# Patient Record
Sex: FEMALE | Race: WHITE | NOT HISPANIC OR LATINO | Employment: FULL TIME | URBAN - METROPOLITAN AREA
[De-identification: names, ages, dates, MRNs, and addresses within clinical notes are randomized per-mention and may not be internally consistent; named-entity substitution may affect disease eponyms.]

---

## 2018-08-03 ENCOUNTER — RECORDS - HEALTHEAST (OUTPATIENT)
Dept: LAB | Facility: CLINIC | Age: 58
End: 2018-08-03

## 2018-08-03 LAB
C REACTIVE PROTEIN LHE: 0.1 MG/DL (ref 0–0.8)
RHEUMATOID FACT SERPL-ACNC: <15 IU/ML (ref 0–30)

## 2018-08-07 LAB — CCP AB SER IA-ACNC: 0.6 U/ML

## 2018-09-02 ENCOUNTER — RECORDS - HEALTHEAST (OUTPATIENT)
Dept: LAB | Facility: CLINIC | Age: 58
End: 2018-09-02

## 2018-09-05 LAB — BACTERIA SPEC CULT: ABNORMAL

## 2020-08-14 ENCOUNTER — RECORDS - HEALTHEAST (OUTPATIENT)
Dept: LAB | Facility: CLINIC | Age: 60
End: 2020-08-14

## 2020-08-14 LAB
C REACTIVE PROTEIN LHE: 0.1 MG/DL (ref 0–0.8)
TSH SERPL DL<=0.005 MIU/L-ACNC: 1.42 UIU/ML (ref 0.3–5)

## 2020-08-17 LAB — B BURGDOR IGG+IGM SER QL: 0.09 INDEX VALUE

## 2020-08-18 LAB — ANA SER QL: 1.1 U

## 2020-11-27 ENCOUNTER — ANESTHESIA - HEALTHEAST (OUTPATIENT)
Dept: SURGERY | Facility: CLINIC | Age: 60
End: 2020-11-27

## 2020-11-27 ASSESSMENT — MIFFLIN-ST. JEOR
SCORE: 1567.44
SCORE: 1567.44

## 2020-11-28 ENCOUNTER — SURGERY - HEALTHEAST (OUTPATIENT)
Dept: SURGERY | Facility: CLINIC | Age: 60
End: 2020-11-28

## 2020-11-28 ENCOUNTER — COMMUNICATION - HEALTHEAST (OUTPATIENT)
Dept: SCHEDULING | Facility: CLINIC | Age: 60
End: 2020-11-28

## 2021-05-25 ENCOUNTER — RECORDS - HEALTHEAST (OUTPATIENT)
Dept: ADMINISTRATIVE | Facility: CLINIC | Age: 61
End: 2021-05-25

## 2021-05-28 ENCOUNTER — RECORDS - HEALTHEAST (OUTPATIENT)
Dept: ADMINISTRATIVE | Facility: CLINIC | Age: 61
End: 2021-05-28

## 2021-05-30 ENCOUNTER — HEALTH MAINTENANCE LETTER (OUTPATIENT)
Age: 61
End: 2021-05-30

## 2021-06-05 VITALS — WEIGHT: 200 LBS | HEIGHT: 70 IN | BODY MASS INDEX: 28.63 KG/M2

## 2021-06-13 NOTE — ANESTHESIA PREPROCEDURE EVALUATION
Anesthesia Evaluation      Patient summary reviewed   No history of anesthetic complications     Airway   Mallampati: II  Neck ROM: full   Pulmonary - negative ROS and normal exam    breath sounds clear to auscultation                         Cardiovascular - normal exam  Exercise tolerance: > or = 4 METS  Rhythm: regular  Rate: normal,         Neuro/Psych      Comments: Dysthymia    Endo/Other - negative ROS      GI/Hepatic/Renal    (+) GERD,        Other findings: Chronic laryngitis  Cluster HAs      Dental - normal exam                        Anesthesia Plan  Planned anesthetic: general endotracheal  D/w surgeon, he would like to avoid a block at this time  Mag  ketamine  ASA 2 - emergent   Induction: intravenous   Anesthetic plan and risks discussed with: patient  Anesthesia plan special considerations: antiemetics, dexmedetomidine  Post-op plan: routine recovery

## 2021-06-13 NOTE — ANESTHESIA CARE TRANSFER NOTE
Last vitals:   Vitals:    11/28/20 1054   BP: 149/72   Pulse: 89   Resp: 16   Temp: 36.6  C (97.9  F)   SpO2: 100%     Patient's level of consciousness is awake and drowsy  Spontaneous respirations: yes  Maintains airway independently: yes  Dentition unchanged: yes  Oropharynx: oropharynx clear of all foreign objects    QCDR Measures:  ASA# 20 - Surgical Safety Checklist: WHO surgical safety checklist completed prior to induction    PQRS# 430 - Adult PONV Prevention: 4558F - Pt received => 2 anti-emetic agents (different classes) preop & intraop  ASA# 8 - Peds PONV Prevention: NA - Not pediatric patient, not GA or 2 or more risk factors NOT present  PQRS# 424 - Jacqueline-op Temp Management: 4559F - At least one body temp DOCUMENTED => 35.5C or 95.9F within required timeframe  PQRS# 426 - PACU Transfer Protocol: - Transfer of care checklist used  ASA# 14 - Acute Post-op Pain: ASA14B - Patient did NOT experience pain >= 7 out of 10

## 2021-06-13 NOTE — ANESTHESIA POSTPROCEDURE EVALUATION
Patient: Alissa Crawley  Procedure(s):  OPEN REDUCTION INTERNAL FIXATION, FRACTURE, TIBIA, USING INTRAMEDULLARY HAYLIE (Right)  OPEN REDUCTION INTERNAL FIXATION, FRACTURE, FIBULA (Right)  Anesthesia type: general    Patient location: PACU  Last vitals:   Vitals Value Taken Time   /67 11/28/20 1130   Temp 36.3  C (97.4  F) 11/28/20 1120   Pulse 72 11/28/20 1130   Resp 16 11/28/20 1130   SpO2 97 % 11/28/20 1130     Post vital signs: stable  Level of consciousness: awake and responds to simple questions  Post-anesthesia pain: pain controlled  Post-anesthesia nausea and vomiting: no  Pulmonary: unassisted, return to baseline  Cardiovascular: stable and blood pressure at baseline  Hydration: adequate  Anesthetic events: no    QCDR Measures:  ASA# 11 - Jacqueline-op Cardiac Arrest: ASA11B - Patient did NOT experience unanticipated cardiac arrest  ASA# 12 - Jacqueline-op Mortality Rate: ASA12B - Patient did NOT die  ASA# 13 - PACU Re-Intubation Rate: ASA13B - Patient did NOT require a new airway mgmt  ASA# 10 - Composite Anes Safety: ASA10A - No serious adverse event    Additional Notes:

## 2021-06-16 PROBLEM — Z78.0 MENOPAUSE: Status: ACTIVE | Noted: 2020-11-27

## 2021-06-16 PROBLEM — S82.401G: Status: ACTIVE | Noted: 2020-11-27

## 2021-06-16 PROBLEM — S82.201S: Status: ACTIVE | Noted: 2020-11-27

## 2021-06-16 PROBLEM — S82.401S: Status: ACTIVE | Noted: 2020-11-27

## 2021-06-16 PROBLEM — S82.202P TIBIA/FIBULA FRACTURE, LEFT, CLOSED, WITH MALUNION, SUBSEQUENT ENCOUNTER: Status: ACTIVE | Noted: 2020-11-28

## 2021-06-16 PROBLEM — J37.0 CHRONIC LARYNGITIS: Status: ACTIVE | Noted: 2020-11-27

## 2021-06-16 PROBLEM — K21.9 GASTROESOPHAGEAL REFLUX DISEASE: Status: ACTIVE | Noted: 2020-11-27

## 2021-06-16 PROBLEM — N39.46 MIXED INCONTINENCE: Status: ACTIVE | Noted: 2020-11-27

## 2021-06-16 PROBLEM — S82.401A TIBIA/FIBULA FRACTURE, RIGHT, CLOSED, INITIAL ENCOUNTER: Status: ACTIVE | Noted: 2020-11-27

## 2021-06-16 PROBLEM — G44.019 EPISODIC CLUSTER HEADACHE: Status: ACTIVE | Noted: 2020-11-27

## 2021-06-16 PROBLEM — S82.201A TIBIA/FIBULA FRACTURE, SHAFT, RIGHT, CLOSED, INITIAL ENCOUNTER: Status: ACTIVE | Noted: 2020-11-27

## 2021-06-16 PROBLEM — F34.1 DYSTHYMIA: Status: ACTIVE | Noted: 2020-11-27

## 2021-06-16 PROBLEM — S82.402P TIBIA/FIBULA FRACTURE, LEFT, CLOSED, WITH MALUNION, SUBSEQUENT ENCOUNTER: Status: ACTIVE | Noted: 2020-11-28

## 2021-06-16 PROBLEM — S82.201A TIBIA/FIBULA FRACTURE, RIGHT, CLOSED, INITIAL ENCOUNTER: Status: ACTIVE | Noted: 2020-11-27

## 2021-06-16 PROBLEM — S82.401A TIBIA/FIBULA FRACTURE, SHAFT, RIGHT, CLOSED, INITIAL ENCOUNTER: Status: ACTIVE | Noted: 2020-11-27

## 2021-06-16 PROBLEM — S82.201G: Status: ACTIVE | Noted: 2020-11-27

## 2021-09-19 ENCOUNTER — HEALTH MAINTENANCE LETTER (OUTPATIENT)
Age: 61
End: 2021-09-19

## 2021-11-11 ENCOUNTER — OFFICE VISIT (OUTPATIENT)
Dept: URGENT CARE | Facility: URGENT CARE | Age: 61
End: 2021-11-11
Payer: COMMERCIAL

## 2021-11-11 ENCOUNTER — ANCILLARY PROCEDURE (OUTPATIENT)
Dept: GENERAL RADIOLOGY | Facility: CLINIC | Age: 61
End: 2021-11-11
Attending: PHYSICIAN ASSISTANT
Payer: COMMERCIAL

## 2021-11-11 VITALS
WEIGHT: 205.4 LBS | OXYGEN SATURATION: 99 % | SYSTOLIC BLOOD PRESSURE: 148 MMHG | BODY MASS INDEX: 29.47 KG/M2 | RESPIRATION RATE: 16 BRPM | TEMPERATURE: 97.3 F | HEART RATE: 79 BPM | DIASTOLIC BLOOD PRESSURE: 82 MMHG

## 2021-11-11 DIAGNOSIS — S99.911A INJURY OF RIGHT ANKLE, INITIAL ENCOUNTER: ICD-10-CM

## 2021-11-11 DIAGNOSIS — S89.91XA INJURY OF RIGHT LOWER EXTREMITY, INITIAL ENCOUNTER: ICD-10-CM

## 2021-11-11 DIAGNOSIS — S93.401A SPRAIN OF RIGHT ANKLE, UNSPECIFIED LIGAMENT, INITIAL ENCOUNTER: Primary | ICD-10-CM

## 2021-11-11 PROCEDURE — 73590 X-RAY EXAM OF LOWER LEG: CPT | Mod: RT | Performed by: RADIOLOGY

## 2021-11-11 PROCEDURE — 73610 X-RAY EXAM OF ANKLE: CPT | Mod: 26 | Performed by: RADIOLOGY

## 2021-11-11 PROCEDURE — 99203 OFFICE O/P NEW LOW 30 MIN: CPT | Performed by: PHYSICIAN ASSISTANT

## 2021-11-11 ASSESSMENT — ENCOUNTER SYMPTOMS
HEADACHES: 0
VOMITING: 0
DIZZINESS: 0
RHINORRHEA: 0
FEVER: 0
CARDIOVASCULAR NEGATIVE: 1
PALPITATIONS: 0
ENDOCRINE NEGATIVE: 1
NAUSEA: 0
NECK PAIN: 0
WEAKNESS: 0
SORE THROAT: 0
NECK STIFFNESS: 0
JOINT SWELLING: 0
RESPIRATORY NEGATIVE: 1
ARTHRALGIAS: 1
SHORTNESS OF BREATH: 0
LIGHT-HEADEDNESS: 0
MYALGIAS: 0
BACK PAIN: 0
DIARRHEA: 0
CHILLS: 0
WOUND: 0
EYES NEGATIVE: 1
COUGH: 0
ALLERGIC/IMMUNOLOGIC NEGATIVE: 1

## 2021-11-11 NOTE — PROGRESS NOTES
"Chief Complaint:    Chief Complaint   Patient presents with     Musculoskeletal Problem     11/10 stepped on wheels of suitcase and fell. Pain \"everywhere\" in right lower leg. Tenderness to touch on the outside of leg. Pt believes that there is a deformity \"bump.\"  Ambulating. Pain currently 5/10. Four breaks in right lower leg one year ago         Medical Decision Making:    Vital signs reviewed by Jose Luis Gastelum PA-C  BP (!) 148/82 (BP Location: Right arm, Patient Position: Sitting, Cuff Size: Adult Large)   Pulse 79   Temp 97.3  F (36.3  C)   Resp 16   Wt 93.2 kg (205 lb 6.4 oz)   SpO2 99%   BMI 29.47 kg/m      Differential Diagnosis:  MS Injury Pain: sprain, fracture, tendonitis, muscle strain, contusion and dislocation      ASSESSMENT:     1. Sprain of right ankle, unspecified ligament, initial encounter    2. Injury of right ankle, initial encounter    3. Injury of right lower extremity, initial encounter           PLAN:     Patient in no acute distress at time of exam.   XR of the R ankle was negative for any acute fracture.  Hardware appears stable per my read.    XR of the R tib/fib was negative for any acute fracture per my read.  RICE discussed.  Ibuprofen for pain.   Patient instructed to follow up with PCP in 1 week if symptoms are not improving.  Sooner if symptoms worsen.  Worrisome symptoms discussed with instructions to go to the ED.  Patient verbalized understanding and agreed with this plan.    Labs:     Results for orders placed or performed in visit on 11/11/21   XR Ankle Right G/E 3 Views     Status: None (Preliminary result)    Narrative    EXAM: XR ANKLE RIGHT G/E 3 VIEWS  LOCATION: [facility]  DATE/TIME: 11/11/2021 5:55 PM    INDICATION: Injury of right ankle, initial encounter.  COMPARISON: X-ray from 11/27/2020.      Impression    IMPRESSION: ORIF of distal tibial diaphyseal fracture with intramedullary thao. Side plate and screw fixation of distal fibula. " Fractures have healed. No acute fracture. Mortise and talar dome appear intact. Mild to moderate midfoot degenerative changes.   Small plantar calcaneal spur.       Current Meds:    Current Outpatient Medications:      acetaminophen (TYLENOL) 325 MG tablet, [ACETAMINOPHEN (TYLENOL) 325 MG TABLET] Take 2 tablets (650 mg total) by mouth every 4 (four) hours as needed., Disp: , Rfl: 0     calcium carbonate (OS-JAZZY) 600 mg calcium (1,500 mg) tablet, [CALCIUM CARBONATE (OS-JAZZY) 600 MG CALCIUM (1,500 MG) TABLET] Take 600 mg by mouth daily., Disp: , Rfl:      sertraline (ZOLOFT) 100 MG tablet, [SERTRALINE (ZOLOFT) 100 MG TABLET] Take 200 mg by mouth daily., Disp: , Rfl:      verapamiL (VERELAN PM) 120 MG 24 hr capsule, [VERAPAMIL (VERELAN PM) 120 MG 24 HR CAPSULE] Take 120 mg by mouth 2 (two) times a week. In the PM - on Weds and Sundays, Disp: , Rfl:      cyanocobalamin 1000 MCG tablet, [CYANOCOBALAMIN 1000 MCG TABLET] Take 1,000 mcg by mouth daily. (Patient not taking: Reported on 11/11/2021), Disp: , Rfl:      docusate sodium (COLACE) 100 MG capsule, [DOCUSATE SODIUM (COLACE) 100 MG CAPSULE] Take 1 capsule (100 mg total) by mouth 2 (two) times a day. (Patient not taking: Reported on 11/11/2021), Disp: , Rfl: 0     esomeprazole (NEXIUM) 20 MG capsule, [ESOMEPRAZOLE (NEXIUM) 20 MG CAPSULE] Take 20 mg by mouth daily before breakfast. (Patient not taking: Reported on 11/11/2021), Disp: , Rfl:      hydrOXYzine HCL (ATARAX) 25 MG tablet, [HYDROXYZINE HCL (ATARAX) 25 MG TABLET] Take 1 tablet (25 mg total) by mouth every 6 (six) hours as needed for anxiety (pain med enhancer or muscle spasm). (Patient not taking: Reported on 11/11/2021), Disp: 40 tablet, Rfl: 0     ondansetron (ZOFRAN-ODT) 4 MG disintegrating tablet, [ONDANSETRON (ZOFRAN-ODT) 4 MG DISINTEGRATING TABLET] Take 1 tablet (4 mg total) by mouth every 6 (six) hours as needed. (Patient not taking: Reported on 11/11/2021), Disp: 30 tablet, Rfl: 0     oxyCODONE  (ROXICODONE) 5 MG immediate release tablet, [OXYCODONE (ROXICODONE) 5 MG IMMEDIATE RELEASE TABLET] Take 1-2 tablets (5-10 mg total) by mouth every 4 (four) hours as needed for pain (MDD 6 tabs). (Patient not taking: Reported on 11/11/2021), Disp: 42 tablet, Rfl: 0     polyethylene glycol (MIRALAX) 17 gram packet, [POLYETHYLENE GLYCOL (MIRALAX) 17 GRAM PACKET] Take 1 packet (17 g total) by mouth daily. (Patient not taking: Reported on 11/11/2021), Disp: , Rfl: 0     rivaroxaban ANTICOAGULANT (XARELTO) 10 mg tablet, [RIVAROXABAN ANTICOAGULANT (XARELTO) 10 MG TABLET] Take 1 tablet (10 mg total) by mouth daily. (Patient not taking: Reported on 11/11/2021), Disp: 30 tablet, Rfl: 0     solifenacin (VESICARE) 10 MG tablet, [SOLIFENACIN (VESICARE) 10 MG TABLET] Take 5 mg by mouth daily. (Patient not taking: Reported on 11/11/2021), Disp: , Rfl:     Allergies:  No Known Allergies    SUBJECTIVE    HPI: Alissa Crawley is an 60 year old female who presents for evaluation and treatment of right leg pain. Patient stepped on a suitcase about 36 hours ago, causing ankle to twist. Reports an immediate shooting pain in right leg. Immediately elevated the leg. Able to ambulate and reports pain is worse when resting after ambulating. Has slight pain in right ankle and increased pain on right outer shin. Painful to the touch. About one year ago, fractured right leg and ankle and is concerned about the hardware. Denies any other concerns at this time.     Patient is new to [facility].    ROS:      Review of Systems   Constitutional: Negative for chills and fever.   HENT: Negative for congestion, ear pain, rhinorrhea and sore throat.    Eyes: Negative.    Respiratory: Negative.  Negative for cough and shortness of breath.    Cardiovascular: Negative.  Negative for chest pain and palpitations.   Gastrointestinal: Negative for diarrhea, nausea and vomiting.   Endocrine: Negative.    Genitourinary: Negative.    Musculoskeletal:  "Positive for arthralgias. Negative for back pain, joint swelling, myalgias, neck pain and neck stiffness.   Skin: Negative.  Negative for rash and wound.   Allergic/Immunologic: Negative.  Negative for immunocompromised state.   Neurological: Negative for dizziness, weakness, light-headedness and headaches.        Family History   Family History   Problem Relation Age of Onset     Heart Disease Mother      Prostate Cancer Father      Anesthesia Reaction Sister         Difficulty awakening     Clotting Disorder No family hx of        Social History  Social History     Socioeconomic History     Marital status:      Spouse name: Not on file     Number of children: Not on file     Years of education: Not on file     Highest education level: Not on file   Occupational History     Not on file   Tobacco Use     Smoking status: Never Smoker     Smokeless tobacco: Never Used   Substance and Sexual Activity     Alcohol use: Yes     Comment: Alcoholic Drinks/day: \"A drink or two on the weekend\"     Drug use: Never     Sexual activity: Not on file   Other Topics Concern     Not on file   Social History Narrative     Not on file     Social Determinants of Health     Financial Resource Strain: Not on file   Food Insecurity: Not on file   Transportation Needs: Not on file   Physical Activity: Not on file   Stress: Not on file   Social Connections: Not on file   Intimate Partner Violence: Not on file   Housing Stability: Not on file        Surgical History:  Past Surgical History:   Procedure Laterality Date     C TREAT TIBIAL SHAFT FX, INTRAMED IMPLANT Right 11/28/2020    Procedure: OPEN REDUCTION INTERNAL FIXATION, FRACTURE, TIBIA, USING INTRAMEDULLARY HAYLIE;  Surgeon: Jose Dawkins MD;  Location: [facility];  Service: Orthopedics     CHOLECYSTECTOMY       HYSTERECTOMY       OPEN REDUCTION INTERNAL FIXATION FIBULA Right 11/28/2020    Procedure: OPEN REDUCTION INTERNAL FIXATION, FRACTURE, FIBULA;  Surgeon: " Jose Dawkins MD;  Location: [facility];  Service: Orthopedics        Problem List:  Patient Active Problem List   Diagnosis     Chronic laryngitis     Dysthymia     Episodic cluster headache     Gastroesophageal reflux disease     Menopause     Mixed incontinence     Tibia/fibula fracture, shaft, right, sequela     Tibia/fibula fracture, shaft, right, closed, initial encounter     Tibia/fibula fracture, right, closed, initial encounter     Tibia/fibula fracture, right, closed, with delayed healing, subsequent encounter     Tibia/fibula fracture, left, closed, with malunion, subsequent encounter         OBJECTIVE:     Vital signs noted and reviewed by Jose Luis Gastelum PA-C  BP (!) 148/82 (BP Location: Right arm, Patient Position: Sitting, Cuff Size: Adult Large)   Pulse 79   Temp 97.3  F (36.3  C)   Resp 16   Wt 93.2 kg (205 lb 6.4 oz)   SpO2 99%   BMI 29.47 kg/m       PEFR:    Physical Exam  Vitals and nursing note reviewed.   Constitutional:       General: She is not in acute distress.     Appearance: She is well-developed. She is not ill-appearing, toxic-appearing or diaphoretic.   Cardiovascular:      Rate and Rhythm: Normal rate and regular rhythm.      Heart sounds: Normal heart sounds, S1 normal and S2 normal. No murmur heard.  No friction rub. No gallop.    Pulmonary:      Effort: Pulmonary effort is normal. No respiratory distress.      Breath sounds: Normal breath sounds. No decreased breath sounds, wheezing, rhonchi or rales.   Abdominal:      Palpations: Abdomen is not rigid.   Musculoskeletal:      Right knee: Normal range of motion.      Right ankle: Normal range of motion.        Legs:    Skin:     General: Skin is warm and dry.   Neurological:      Mental Status: She is alert and oriented to person, place, and time.      Deep Tendon Reflexes: Reflexes are normal and symmetric.   Psychiatric:         Behavior: Behavior normal. Behavior is cooperative.         Thought Content: Thought  content normal.         Judgment: Judgment normal.             Jose Luis Gastelum PA-C  11/11/2021, 5:38 PM

## 2021-11-12 NOTE — PATIENT INSTRUCTIONS
Patient Education     Treating Ankle Sprains  Treatment will depend on how bad your sprain is. For a severe sprain, healing may take 3 months or more.  Right after your injury: Use R.I.C.E.    BIG: Rest: At first, keep weight off the ankle as much as you can. You may be given crutches to help you walk without putting weight on the ankle.    BIG: Ice: Put an ice pack on the ankle for 20 minutes. Remove the pack and wait at least 30 minutes. Repeat for up to 3 days. This helps reduce swelling.    BIG: Compression: To reduce swelling and keep the joint stable, you may need to wrap the ankle with an elastic bandage. For more severe sprains, you may need an ankle brace, a boot, or a cast.    BIG: Elevation: To reduce swelling, keep your ankle raised above your heart when you sit or lie down.  Medicine  Your healthcare provider may suggest oral nonsteroidal anti-inflammatory medicine (NSAIDs), such as ibuprofen. This relieves the pain and helps reduce swelling. Be sure to take your medicine as directed.  Exercises    After about 2 to 3 weeks, you may be given exercises to strengthen the ligaments and muscles in the ankle. Doing these exercises will help prevent another ankle sprain. Exercises may include standing on your toes and then on your heels and doing ankle curls.    Sit on the edge of a sturdy table or lie on your back.    Pull your toes toward you. Then point them away from you. Repeat for 2 to 3 minutes.  [employer] last reviewed this educational content on 1/1/2018 2000-2021 [employer]. All rights reserved. This information is not intended as a substitute for professional medical care. Always follow your healthcare professional's instructions.

## 2022-02-08 ENCOUNTER — LAB REQUISITION (OUTPATIENT)
Dept: LAB | Facility: CLINIC | Age: 62
End: 2022-02-08
Payer: COMMERCIAL

## 2022-02-08 DIAGNOSIS — Z01.818 ENCOUNTER FOR OTHER PREPROCEDURAL EXAMINATION: ICD-10-CM

## 2022-02-08 LAB — SARS-COV-2 RNA RESP QL NAA+PROBE: NEGATIVE

## 2022-02-08 PROCEDURE — U0005 INFEC AGEN DETEC AMPLI PROBE: HCPCS | Mod: ORL | Performed by: PHYSICIAN ASSISTANT

## 2022-02-10 ENCOUNTER — HOSPITAL ENCOUNTER (EMERGENCY)
Facility: CLINIC | Age: 62
Discharge: HOME | End: 2022-02-10
Attending: FAMILY MEDICINE | Admitting: FAMILY MEDICINE
Payer: COMMERCIAL

## 2022-02-10 VITALS
BODY MASS INDEX: 30.06 KG/M2 | OXYGEN SATURATION: 99 % | WEIGHT: 210 LBS | SYSTOLIC BLOOD PRESSURE: 143 MMHG | HEIGHT: 70 IN | DIASTOLIC BLOOD PRESSURE: 96 MMHG | RESPIRATION RATE: 16 BRPM | TEMPERATURE: 97.6 F | HEART RATE: 70 BPM

## 2022-02-10 DIAGNOSIS — T81.31XA POSTOPERATIVE WOUND DEHISCENCE, INITIAL ENCOUNTER: ICD-10-CM

## 2022-02-10 PROCEDURE — 12002 RPR S/N/AX/GEN/TRNK2.6-7.5CM: CPT | Performed by: FAMILY MEDICINE

## 2022-02-10 PROCEDURE — 99282 EMERGENCY DEPT VISIT SF MDM: CPT | Mod: 25 | Performed by: FAMILY MEDICINE

## 2022-02-10 PROCEDURE — 99283 EMERGENCY DEPT VISIT LOW MDM: CPT | Performed by: FAMILY MEDICINE

## 2022-02-10 ASSESSMENT — MIFFLIN-ST. JEOR: SCORE: 1607.8

## 2022-02-10 NOTE — ED TRIAGE NOTES
Pt had plate and screws taken out of her right foot today at [facility] orthopedics. Now bleeding

## 2022-02-10 NOTE — ED PROVIDER NOTES
History     Chief Complaint   Patient presents with     Post-op Problem     HPI  Alissa Crawley is a 61 year old female who presents with history of cluster headaches, post menopause, to be a fibular fracture.  She had a revision of an ORIF today when hardware was removed by [facility] in [place].  The procedure was without complication was done about 1400 hrs.  She arrived home and barely moved her foot when she noted there was active bleeding from the wound area and she noted likely dehiscence of the wound approximately half centimeter.  No other injuries.  No significant pain.  Local anesthetic is still active.        Allergies:  No Known Allergies    Problem List:    Patient Active Problem List    Diagnosis Date Noted     Tibia/fibula fracture, left, closed, with malunion, subsequent encounter 11/28/2020     Priority: Medium     Chronic laryngitis 11/27/2020     Priority: Medium     Dysthymia 11/27/2020     Priority: Medium     Episodic cluster headache 11/27/2020     Priority: Medium     Gastroesophageal reflux disease 11/27/2020     Priority: Medium     Menopause 11/27/2020     Priority: Medium     Mixed incontinence 11/27/2020     Priority: Medium     Tibia/fibula fracture, shaft, right, sequela 11/27/2020     Priority: Medium     Tibia/fibula fracture, shaft, right, closed, initial encounter 11/27/2020     Priority: Medium     Tibia/fibula fracture, right, closed, initial encounter 11/27/2020     Priority: Medium     Added automatically from request for surgery [id number]         Tibia/fibula fracture, right, closed, with delayed healing, subsequent encounter 11/27/2020     Priority: Medium        Past Medical History:    Past Medical History:   Diagnosis Date     Chronic laryngitis 11/27/2020     Dysthymia 11/27/2020     Episodic cluster headache 11/27/2020     Gastroesophageal reflux disease 11/27/2020       Past Surgical History:    Past Surgical History:   Procedure Laterality Date      "CHOLECYSTECTOMY       HYSTERECTOMY       OPEN REDUCTION INTERNAL FIXATION FIBULA Right 11/28/2020    Procedure: OPEN REDUCTION INTERNAL FIXATION, FRACTURE, FIBULA;  Surgeon: Jose Dawkins MD;  Location: [facility];  Service: Orthopedics     [facility] TREAT TIBIAL SHAFT FX, INTRAMED IMPLANT Right 11/28/2020    Procedure: OPEN REDUCTION INTERNAL FIXATION, FRACTURE, TIBIA, USING INTRAMEDULLARY HAYLIE;  Surgeon: Jose Dawkins MD;  Location: [facility];  Service: Orthopedics       Family History:    Family History   Problem Relation Age of Onset     Heart Disease Mother      Prostate Cancer Father      Anesthesia Reaction Sister         Difficulty awakening     Clotting Disorder No family hx of        Social History:  Marital Status:   [2]  Social History     Tobacco Use     Smoking status: Never Smoker     Smokeless tobacco: Never Used   Substance Use Topics     Alcohol use: Yes     Comment: Alcoholic Drinks/day: \"A drink or two on the weekend\"     Drug use: Never        Medications:    acetaminophen (TYLENOL) 325 MG tablet  calcium carbonate (OS-JAZZY) 600 mg calcium (1,500 mg) tablet  cyanocobalamin 1000 MCG tablet  docusate sodium (COLACE) 100 MG capsule  esomeprazole (NEXIUM) 20 MG capsule  hydrOXYzine HCL (ATARAX) 25 MG tablet  ondansetron (ZOFRAN-ODT) 4 MG disintegrating tablet  oxyCODONE (ROXICODONE) 5 MG immediate release tablet  polyethylene glycol (MIRALAX) 17 gram packet  rivaroxaban ANTICOAGULANT (XARELTO) 10 mg tablet  sertraline (ZOLOFT) 100 MG tablet  solifenacin (VESICARE) 10 MG tablet  verapamiL (VERELAN PM) 120 MG 24 hr capsule          Review of Systems  ROS:  5 point ROS negative except as noted above in HPI, including Gen., Resp., CV, GI &  system review.    Physical Exam   BP: (!) 143/96  Pulse: 70  Temp: 97.6  F (36.4  C)  Resp: 16  Height: 177.8 cm (5' 10\")  Weight: 95.3 kg (210 lb)  SpO2: 99 %      Physical Exam     Distal pulses are intact.  There is no active bleeding from " the site.  Findings consistent with dehiscence of wound.  Distal motor function and sensation intact.              ED Course                 [facility]    -Laceration Repair    Date/Time: 2/10/2022 7:21 PM  Performed by: Mehul Noble MD  Authorized by: Mehul Noble MD     Risks, benefits and alternatives discussed.    LACERATION DETAILS     Location:  Leg    Leg location:  R lower leg    Length (cm):  3    Depth (mm):  5    REPAIR TYPE:     Repair type:  Simple      EXPLORATION:     Wound exploration: wound explored through full range of motion and entire depth of wound probed and visualized      Contaminated: no      TREATMENT:     Area cleansed with:  Saline    Amount of cleaning:  Extensive    Irrigation solution:  Sterile saline    Irrigation volume:  1000    SKIN REPAIR     Repair method:  Sutures    Suture size:  3-0    Suture material:  Nylon    Number of sutures:  6    APPROXIMATION     Approximation:  Close    POST-PROCEDURE DETAILS     Dressing:  Sterile dressing        PROCEDURE    Patient Tolerance:  Patient tolerated the procedure well with no immediate complications                Critical Care time:  none               No results found for this or any previous visit (from the past 24 hour(s)).    Medications - No data to display    Assessments & Plan (with Medical Decision Making)     MDM: Alissa Crawley is a 61 year old female who presents with a wound that has dehisced immediately after surgery today on the medial aspect of the ankle.  No significant injury to the site.  She barely moved her ankle and found bleeding.  There is no active bleeding at this point.  I have paged [facility] to ask their permission to repair the wound and resuture.   I spoke with Dr. Daniel at [facility] who approved my placing repeat sutures.  She recommends simple interrupted 3 all.  Patient had a nerve block and no additional anesthesia was needed.  She was  extensively irrigated with 1000 cc and this was resutured without complication.  There was some venous bleeding present.  The wound was then covered with Adaptic, gauze and Ace.  Plan for sutures out 10 to 14 days.  I asked her to discuss further with her surgeon as to the timing.  She does have an appointment with them in 16 days and she will talk to them whether the sutures should come out than earlier.        I have reviewed the nursing notes.    I have reviewed the findings, diagnosis, plan and need for follow up with the patient.       New Prescriptions    No medications on file       Final diagnoses:   Postoperative wound dehiscence, initial encounter - after consulting with [facility], I reclosed  the wound.  this  will ooze some  blood tonight. keep bandaged.  return immed for signs infection. sutures out in 10-14 days - wouldlean towards 14.  discuss  further with ortho       2/10/2022   [facility] EMERGENCY DEPT     Mehul Noble MD  02/11/22 0225

## 2022-02-11 NOTE — DISCHARGE INSTRUCTIONS
ICD-10-CM    1. Postoperative wound dehiscence, initial encounter  T81.31XA     after consulting with Eufemia, I reclosed  the wound.  this  will ooze some  blood tonight. keep bandaged.  return immed for signs infection. sutures out in 10-14 days - wouldlean towards 14.  discuss  further with ortho

## 2022-06-26 ENCOUNTER — HEALTH MAINTENANCE LETTER (OUTPATIENT)
Age: 62
End: 2022-06-26

## 2022-07-17 ENCOUNTER — APPOINTMENT (OUTPATIENT)
Dept: GENERAL RADIOLOGY | Facility: CLINIC | Age: 62
End: 2022-07-17
Attending: EMERGENCY MEDICINE
Payer: COMMERCIAL

## 2022-07-17 ENCOUNTER — HOSPITAL ENCOUNTER (EMERGENCY)
Facility: CLINIC | Age: 62
Discharge: HOME | End: 2022-07-17
Attending: EMERGENCY MEDICINE | Admitting: EMERGENCY MEDICINE
Payer: COMMERCIAL

## 2022-07-17 VITALS
DIASTOLIC BLOOD PRESSURE: 77 MMHG | OXYGEN SATURATION: 97 % | SYSTOLIC BLOOD PRESSURE: 140 MMHG | TEMPERATURE: 98.4 F | HEART RATE: 57 BPM | WEIGHT: 215 LBS | BODY MASS INDEX: 30.78 KG/M2 | HEIGHT: 70 IN | RESPIRATION RATE: 16 BRPM

## 2022-07-17 DIAGNOSIS — M54.50 LUMBAR BACK PAIN: ICD-10-CM

## 2022-07-17 PROCEDURE — 72100 X-RAY EXAM L-S SPINE 2/3 VWS: CPT

## 2022-07-17 PROCEDURE — 96375 TX/PRO/DX INJ NEW DRUG ADDON: CPT | Performed by: EMERGENCY MEDICINE

## 2022-07-17 PROCEDURE — 99284 EMERGENCY DEPT VISIT MOD MDM: CPT | Performed by: EMERGENCY MEDICINE

## 2022-07-17 PROCEDURE — 250N000011 HC RX IP 250 OP 636: Performed by: EMERGENCY MEDICINE

## 2022-07-17 PROCEDURE — 96374 THER/PROPH/DIAG INJ IV PUSH: CPT | Performed by: EMERGENCY MEDICINE

## 2022-07-17 PROCEDURE — 250N000013 HC RX MED GY IP 250 OP 250 PS 637: Performed by: EMERGENCY MEDICINE

## 2022-07-17 PROCEDURE — 99284 EMERGENCY DEPT VISIT MOD MDM: CPT | Mod: 25 | Performed by: EMERGENCY MEDICINE

## 2022-07-17 RX ORDER — ONDANSETRON 2 MG/ML
4 INJECTION INTRAMUSCULAR; INTRAVENOUS ONCE
Status: COMPLETED | OUTPATIENT
Start: 2022-07-17 | End: 2022-07-17

## 2022-07-17 RX ORDER — PREDNISONE 10 MG/1
TABLET ORAL
Qty: 7 TABLET | Refills: 0 | Status: SHIPPED | OUTPATIENT
Start: 2022-07-19 | End: 2022-07-23

## 2022-07-17 RX ORDER — DEXAMETHASONE SODIUM PHOSPHATE 10 MG/ML
10 INJECTION, SOLUTION INTRAMUSCULAR; INTRAVENOUS ONCE
Status: COMPLETED | OUTPATIENT
Start: 2022-07-17 | End: 2022-07-17

## 2022-07-17 RX ORDER — CYCLOBENZAPRINE HCL 5 MG
5 TABLET ORAL ONCE
Status: COMPLETED | OUTPATIENT
Start: 2022-07-17 | End: 2022-07-17

## 2022-07-17 RX ORDER — KETOROLAC TROMETHAMINE 15 MG/ML
10 INJECTION, SOLUTION INTRAMUSCULAR; INTRAVENOUS
Status: COMPLETED | OUTPATIENT
Start: 2022-07-17 | End: 2022-07-17

## 2022-07-17 RX ORDER — CYCLOBENZAPRINE HCL 5 MG
5 TABLET ORAL 3 TIMES DAILY PRN
Qty: 10 TABLET | Refills: 0 | Status: SHIPPED | OUTPATIENT
Start: 2022-07-17 | End: 2022-08-18

## 2022-07-17 RX ADMIN — ONDANSETRON 4 MG: 2 INJECTION INTRAMUSCULAR; INTRAVENOUS at 10:33

## 2022-07-17 RX ADMIN — DEXAMETHASONE SODIUM PHOSPHATE 10 MG: 10 INJECTION, SOLUTION INTRAMUSCULAR; INTRAVENOUS at 11:52

## 2022-07-17 RX ADMIN — CYCLOBENZAPRINE HYDROCHLORIDE 5 MG: 5 TABLET, FILM COATED ORAL at 12:08

## 2022-07-17 RX ADMIN — KETOROLAC TROMETHAMINE 10 MG: 15 INJECTION, SOLUTION INTRAMUSCULAR; INTRAVENOUS at 11:53

## 2022-07-17 ASSESSMENT — ENCOUNTER SYMPTOMS
PSYCHIATRIC NEGATIVE: 1
NEUROLOGICAL NEGATIVE: 1
GASTROINTESTINAL NEGATIVE: 1
EYES NEGATIVE: 1
ENDOCRINE NEGATIVE: 1
RESPIRATORY NEGATIVE: 1
BACK PAIN: 1
ALLERGIC/IMMUNOLOGIC NEGATIVE: 1
CONSTITUTIONAL NEGATIVE: 1
HEMATOLOGIC/LYMPHATIC NEGATIVE: 1
CARDIOVASCULAR NEGATIVE: 1

## 2022-07-17 NOTE — ED NOTES
Ice pack given to pt. Meds given, see MAR. Pt states that her son is on his way to sit with her and be her ride home if needed.   Pt resting in position of comfort. Call light in reach.

## 2022-07-17 NOTE — ED TRIAGE NOTES
"Pt was bent over putting things in the refrigerator today and \"couldn't straiten out after that\".   Pt was given 50mcg Fentanyl by EMS. 20g IV R hand.   Pt denies any bowel/bladder concerns. No numbness/tinlging. Increased pain with movement.     Triage Assessment     Row Name 07/17/22 1027       Triage Assessment (Adult)    Airway WDL WDL       Respiratory WDL    Respiratory WDL WDL       Skin Circulation/Temperature WDL    Skin Circulation/Temperature WDL WDL       Cardiac WDL    Cardiac WDL WDL       Peripheral/Neurovascular WDL    Peripheral Neurovascular WDL WDL       Cognitive/Neuro/Behavioral WDL    Cognitive/Neuro/Behavioral WDL WDL              "

## 2022-07-17 NOTE — ED PROVIDER NOTES
History     Chief Complaint   Patient presents with     Back Pain     Pt was lifting items and felt a pop     HPI  Alissa Crawley is a 61 year old female arrived by EMS with sudden onset of back pain after reporting that she had placed items above her refrigerator earlier in the day prior to ED arrival.  Felt a sudden pop with intense pain.  EMS providers administered IV fentanyl with some relief but continued ability to straighten her back by report.    Medical records show a history of chronic laryngitis, dysthymia and episodic cluster headaches and GERD.  Patient tells me she is only on verapamil and sertraline.  She is home alone in [place] right now as her  is fishing in [place].  She was placing pop cans into the refrigerator bent over when she got up and suddenly felt a popping sensation and developed acute pain about the lower lumbar spine.  She has no paresthesias or anesthesia no leg weakness.  The pain does not radiate and is localized around the lumbosacral region.  Patient reports she did not suffer a fall.  She does not know if she has a history of back problems and has not had any back surgery. Patient reports that she suffered a right lower leg injury about a year ago while on her deck with her dog and had surgical repair.  She has favored her right leg since and may have some asymmetry with her pelvis and hip.    Allergies:  No Known Allergies    Problem List:    Patient Active Problem List    Diagnosis Date Noted     Tibia/fibula fracture, left, closed, with malunion, subsequent encounter 11/28/2020     Priority: Medium     Chronic laryngitis 11/27/2020     Priority: Medium     Dysthymia 11/27/2020     Priority: Medium     Episodic cluster headache 11/27/2020     Priority: Medium     Gastroesophageal reflux disease 11/27/2020     Priority: Medium     Menopause 11/27/2020     Priority: Medium     Mixed incontinence 11/27/2020     Priority: Medium     Tibia/fibula fracture, shaft,  "right, sequela 11/27/2020     Priority: Medium     Tibia/fibula fracture, shaft, right, closed, initial encounter 11/27/2020     Priority: Medium     Tibia/fibula fracture, right, closed, initial encounter 11/27/2020     Priority: Medium     Added automatically from request for surgery [id number]         Tibia/fibula fracture, right, closed, with delayed healing, subsequent encounter 11/27/2020     Priority: Medium        Past Medical History:    Past Medical History:   Diagnosis Date     Chronic laryngitis 11/27/2020     Dysthymia 11/27/2020     Episodic cluster headache 11/27/2020     Gastroesophageal reflux disease 11/27/2020       Past Surgical History:    Past Surgical History:   Procedure Laterality Date     CHOLECYSTECTOMY       HYSTERECTOMY       OPEN REDUCTION INTERNAL FIXATION FIBULA Right 11/28/2020    Procedure: OPEN REDUCTION INTERNAL FIXATION, FRACTURE, FIBULA;  Surgeon: Jose Dawkins MD;  Location: [facility];  Service: Orthopedics     [facility] TREAT TIBIAL SHAFT FX, INTRAMED IMPLANT Right 11/28/2020    Procedure: OPEN REDUCTION INTERNAL FIXATION, FRACTURE, TIBIA, USING INTRAMEDULLARY HAYLIE;  Surgeon: Jose Dawkins MD;  Location: [facility];  Service: Orthopedics       Family History:    Family History   Problem Relation Age of Onset     Heart Disease Mother      Prostate Cancer Father      Anesthesia Reaction Sister         Difficulty awakening     Clotting Disorder No family hx of        Social History:  Marital Status:   [2]  Social History     Tobacco Use     Smoking status: Never Smoker     Smokeless tobacco: Never Used   Substance Use Topics     Alcohol use: Yes     Comment: Alcoholic Drinks/day: \"A drink or two on the weekend\"     Drug use: Never        Medications:    cyclobenzaprine (FLEXERIL) 5 MG tablet  [START ON 7/19/2022] predniSONE (DELTASONE) 10 MG tablet  acetaminophen (TYLENOL) 325 MG tablet  calcium carbonate (OS-JAZZY) 600 mg calcium (1,500 mg) " "tablet  cyanocobalamin 1000 MCG tablet  docusate sodium (COLACE) 100 MG capsule  esomeprazole (NEXIUM) 20 MG capsule  hydrOXYzine HCL (ATARAX) 25 MG tablet  ondansetron (ZOFRAN-ODT) 4 MG disintegrating tablet  oxyCODONE (ROXICODONE) 5 MG immediate release tablet  polyethylene glycol (MIRALAX) 17 gram packet  rivaroxaban ANTICOAGULANT (XARELTO) 10 mg tablet  sertraline (ZOLOFT) 100 MG tablet  solifenacin (VESICARE) 10 MG tablet  verapamiL (VERELAN PM) 120 MG 24 hr capsule          Review of Systems   Constitutional: Negative.    HENT: Negative.    Eyes: Negative.    Respiratory: Negative.    Cardiovascular: Negative.    Gastrointestinal: Negative.    Endocrine: Negative.    Genitourinary: Negative.    Musculoskeletal: Positive for back pain.   Skin: Negative.    Allergic/Immunologic: Negative.    Neurological: Negative.    Hematological: Negative.    Psychiatric/Behavioral: Negative.    All other systems reviewed and are negative.      Physical Exam   BP: (!) 145/93  Pulse: 69  Temp: 98.4  F (36.9  C)  Resp: 16  Height: 177.8 cm (5' 10\")  Weight: 97.5 kg (215 lb)  SpO2: 98 %      Physical Exam  Constitutional:       General: She is not in acute distress.     Appearance: Normal appearance. She is not ill-appearing, toxic-appearing or diaphoretic.   HENT:      Head: Normocephalic and atraumatic.      Nose: Nose normal.   Eyes:      Extraocular Movements: Extraocular movements intact.      Pupils: Pupils are equal, round, and reactive to light.   Cardiovascular:      Rate and Rhythm: Normal rate.      Pulses: Normal pulses.   Pulmonary:      Effort: Pulmonary effort is normal. No respiratory distress.      Breath sounds: Normal breath sounds. No stridor. No wheezing or rhonchi.   Chest:      Chest wall: No tenderness.   Musculoskeletal:         General: Tenderness present. No swelling, deformity or signs of injury.        Arms:       Cervical back: Normal range of motion and neck supple.        Back:    Skin:     " Capillary Refill: Capillary refill takes less than 2 seconds.      Coloration: Skin is not jaundiced or pale.      Findings: No erythema, lesion or rash.   Neurological:      Mental Status: She is alert and oriented to person, place, and time.      Cranial Nerves: No cranial nerve deficit.      Sensory: No sensory deficit.      Motor: No weakness.      Coordination: Coordination normal.      Gait: Gait normal.      Deep Tendon Reflexes: Reflexes normal.   Psychiatric:         Mood and Affect: Mood normal.         Behavior: Behavior normal.         Thought Content: Thought content normal.         Judgment: Judgment normal.         ED Course                 Procedures              Critical Care time:  none               ED medications:  Medications   ondansetron (ZOFRAN) injection 4 mg (4 mg Intravenous Given 7/17/22 1033)   cyclobenzaprine (FLEXERIL) tablet 5 mg (5 mg Oral Given 7/17/22 1208)   dexamethasone PF (DECADRON) injection 10 mg (10 mg Intravenous Given 7/17/22 1152)   ketorolac (TORADOL) injection 10 mg (10 mg Intravenous Given 7/17/22 1153)     ED vitals:  Vitals:    07/17/22 1245 07/17/22 1300 07/17/22 1315 07/17/22 1330   BP: 126/83 124/76 122/74 139/78   Pulse: 59 56 58 57   Resp:    16   Temp:       TempSrc:       SpO2: 99% 98% 99% 100%   Weight:       Height:         ED labs and imaging:  Results for orders placed or performed during the hospital encounter of 07/17/22   Lumbar spine XR, 2-3 views     Status: None    Narrative    EXAM: XR LUMBAR SPINE 2-3 VIEWS  LOCATION: [facility]  DATE/TIME: 07/17/2022, 11:56 AM    INDICATION: Low back pain.  Patient was placing soda cans in her refrigerator when she felt a pop.  Evaluate for acute bony process.  COMPARISON: None.  TECHNIQUE: CR Lumbar Spine.      Impression    IMPRESSION: Five lumbar-type vertebrae. Normal alignment. Vertebral body heights normal. No fractures. Loss of disc height and degenerative endplate spurring at  L1-L2. No other significant degenerative change.         Assessments & Plan (with Medical Decision Making)   Assessment Summary and clinical Impression: 61-year-old female presented with back pain that began while placing pop cans into her refrigerator just prior to arrival when she repositioned and felt a pop and developed intense pain. She was given IV fentanyl by EMS providers because she could not straighten her back out.  She has a history of chronic laryngitis, dysthymia GERD.  She arrived afebrile intake blood pressure was 150/92. On examination patient reports  shedid not suffer a fall.  No known history of back surgery or injury but may have some arthritis.  On exam she did not have any leg weakness or numbness.  There is reproducible pain in the lumbosacral area there is no soft tissue swelling and no flank pain or tendernes.  She reported some mild lower abdominal cramping. XR imaging obtained given age report of hearing a pop persistent pain despite therapies offered by EMS providers.Imaging revealed no acute compression fracture or significant degenerative changes . Patient reported no red flags specifically no fever or chills. she was offered additional therapies to manage lumbar back pain.  Pain was moderately controlled and patient expressed comfort with ongoing care as an outpatient.    ED course and plan:  Reviewed the medical record.  ED visit on 2/10/2022.  She was offered therapies to manage her pain.  X-ray imaging of the lumbar spine was obtained with report of pop exquisite pain inability to sit up straight to evaluate for compression fracture versus other acute process.   X-ray imaging of the lumbar spine was reviewed independently and the radiologist report was also reviewed.  Patient  was reevaluated after therapies given during her ED course and she ambulated without difficulty.  She reported  having  relief in her discomfort and expressed comfort going home with ongoing care as an  outpatient.  I suspect lumbar strain based on mechanism reported with reassuring x-ray showing no compression fracture or significant degenerative changes.  She was offered supportive care measures including heat or ice whatever feels good.  We also discussed Flexeril and she was given a prednisone taper.  We discussed that although her initial evaluation today did not reveal an emergency diagnosis if she has persistent pain or develop new symptoms of concern including but not limited to leg weakness or numbness, difficulty urinating or defecating, persistent back pain or abdominal pain she should return to be reevaluated.  Further imaging may be required and can be coordinated with her clinic provider as needed,      Disclaimer: This note consists of symbols derived from keyboarding, dictation and/or voice recognition software. As a result, there may be errors in the script that have gone undetected. Please consider this when interpreting information found in this chart.  I have reviewed the nursing notes.    I have reviewed the findings, diagnosis, plan and need for follow up with the patient.       New Prescriptions    CYCLOBENZAPRINE (FLEXERIL) 5 MG TABLET    Take 1 tablet (5 mg) by mouth 3 times daily as needed for muscle spasms    PREDNISONE (DELTASONE) 10 MG TABLET    Two tablets daily for 2 days, then one tab for two days, the 1/2 tab daily for 2 days       Final diagnoses:   Lumbar back pain - acute onset prior to ED arrival after bending over placing soda pop in the refrigerator       7/17/2022   [facility] EMERGENCY DEPT     Waqas Jacobs MD  07/17/22 5195

## 2022-07-17 NOTE — DISCHARGE INSTRUCTIONS
1) Your evaluation today did not reveal an emergency diagnosis and your symptoms are suspicious for lumbar strain.  X imaging show any compression fracture.  You reported some improvement during your visit with therapies given to manage her symptoms.  He appears stable for discharge to home.    2) Suggest using prescriptions provided Flexeril for muscle spasms and pain as needed 3 times a day over the next 1 week, Steroids (prednisone) taper to begin in 48 hours if you have persistent pain. You received a dose during your visit.    3) Although you appear stable for discharge to home if you develop new symptoms of concern including but not limited to leg weakness or numbness, difficulty defecating or urinating you should return to the department to be reevaluated.  Follow-up imaging if you have persistent pain in the next 1 to 4 weeks may be beneficial and this can be coordinated with your clinic provider

## 2022-07-17 NOTE — ED NOTES
"Pt back from xray, used bedside commode and then back to bed. Pt states \"at least I can move a little bit now\".   Pt back on monitors, call light in reach. Awaiting results and disposition.   "

## 2022-08-18 ENCOUNTER — OFFICE VISIT (OUTPATIENT)
Dept: FAMILY MEDICINE | Facility: CLINIC | Age: 62
End: 2022-08-18
Payer: COMMERCIAL

## 2022-08-18 VITALS
HEIGHT: 70 IN | HEART RATE: 95 BPM | TEMPERATURE: 97.9 F | OXYGEN SATURATION: 98 % | DIASTOLIC BLOOD PRESSURE: 86 MMHG | WEIGHT: 211 LBS | SYSTOLIC BLOOD PRESSURE: 112 MMHG | RESPIRATION RATE: 16 BRPM | BODY MASS INDEX: 30.21 KG/M2

## 2022-08-18 DIAGNOSIS — E55.9 VITAMIN D DEFICIENCY: ICD-10-CM

## 2022-08-18 DIAGNOSIS — Z86.59 HISTORY OF CLAUSTROPHOBIA: ICD-10-CM

## 2022-08-18 DIAGNOSIS — M54.16 LUMBAR RADICULOPATHY: Primary | ICD-10-CM

## 2022-08-18 DIAGNOSIS — R20.2 PARESTHESIA: ICD-10-CM

## 2022-08-18 LAB
ANION GAP SERPL CALCULATED.3IONS-SCNC: 7 MMOL/L (ref 3–14)
BUN SERPL-MCNC: 20 MG/DL (ref 7–30)
CALCIUM SERPL-MCNC: 9 MG/DL (ref 8.5–10.1)
CHLORIDE BLD-SCNC: 105 MMOL/L (ref 94–109)
CO2 SERPL-SCNC: 26 MMOL/L (ref 20–32)
CREAT SERPL-MCNC: 0.78 MG/DL (ref 0.52–1.04)
CRP SERPL-MCNC: <2.9 MG/L (ref 0–8)
DEPRECATED CALCIDIOL+CALCIFEROL SERPL-MC: 30 UG/L (ref 20–75)
ERYTHROCYTE [DISTWIDTH] IN BLOOD BY AUTOMATED COUNT: 13.6 % (ref 10–15)
ERYTHROCYTE [SEDIMENTATION RATE] IN BLOOD BY WESTERGREN METHOD: 29 MM/HR (ref 0–30)
FOLATE SERPL-MCNC: 17.7 NG/ML (ref 4.6–34.8)
GFR SERPL CREATININE-BSD FRML MDRD: 87 ML/MIN/1.73M2
GLUCOSE BLD-MCNC: 107 MG/DL (ref 70–99)
HCT VFR BLD AUTO: 35.5 % (ref 35–47)
HGB BLD-MCNC: 11.7 G/DL (ref 11.7–15.7)
MCH RBC QN AUTO: 32.5 PG (ref 26.5–33)
MCHC RBC AUTO-ENTMCNC: 33 G/DL (ref 31.5–36.5)
MCV RBC AUTO: 99 FL (ref 78–100)
PLATELET # BLD AUTO: 258 10E3/UL (ref 150–450)
POTASSIUM BLD-SCNC: 4.2 MMOL/L (ref 3.4–5.3)
RBC # BLD AUTO: 3.6 10E6/UL (ref 3.8–5.2)
SODIUM SERPL-SCNC: 138 MMOL/L (ref 133–144)
TSH SERPL DL<=0.005 MIU/L-ACNC: 1.76 MU/L (ref 0.4–4)
VIT B12 SERPL-MCNC: 431 PG/ML (ref 232–1245)
WBC # BLD AUTO: 4.6 10E3/UL (ref 4–11)

## 2022-08-18 PROCEDURE — 36415 COLL VENOUS BLD VENIPUNCTURE: CPT | Performed by: NURSE PRACTITIONER

## 2022-08-18 PROCEDURE — 80048 BASIC METABOLIC PNL TOTAL CA: CPT | Performed by: NURSE PRACTITIONER

## 2022-08-18 PROCEDURE — 82607 VITAMIN B-12: CPT | Performed by: NURSE PRACTITIONER

## 2022-08-18 PROCEDURE — 99204 OFFICE O/P NEW MOD 45 MIN: CPT | Performed by: NURSE PRACTITIONER

## 2022-08-18 PROCEDURE — 85027 COMPLETE CBC AUTOMATED: CPT | Performed by: NURSE PRACTITIONER

## 2022-08-18 PROCEDURE — 86140 C-REACTIVE PROTEIN: CPT | Performed by: NURSE PRACTITIONER

## 2022-08-18 PROCEDURE — 82306 VITAMIN D 25 HYDROXY: CPT | Performed by: NURSE PRACTITIONER

## 2022-08-18 PROCEDURE — 86200 CCP ANTIBODY: CPT | Performed by: NURSE PRACTITIONER

## 2022-08-18 PROCEDURE — 86038 ANTINUCLEAR ANTIBODIES: CPT | Performed by: NURSE PRACTITIONER

## 2022-08-18 PROCEDURE — 82746 ASSAY OF FOLIC ACID SERUM: CPT | Performed by: NURSE PRACTITIONER

## 2022-08-18 PROCEDURE — 86039 ANTINUCLEAR ANTIBODIES (ANA): CPT | Performed by: NURSE PRACTITIONER

## 2022-08-18 PROCEDURE — 84443 ASSAY THYROID STIM HORMONE: CPT | Performed by: NURSE PRACTITIONER

## 2022-08-18 PROCEDURE — 85652 RBC SED RATE AUTOMATED: CPT | Performed by: NURSE PRACTITIONER

## 2022-08-18 PROCEDURE — 86618 LYME DISEASE ANTIBODY: CPT | Performed by: NURSE PRACTITIONER

## 2022-08-18 PROCEDURE — 86431 RHEUMATOID FACTOR QUANT: CPT | Performed by: NURSE PRACTITIONER

## 2022-08-18 RX ORDER — DIAZEPAM 2 MG
TABLET ORAL
Qty: 2 TABLET | Refills: 0 | Status: SHIPPED | OUTPATIENT
Start: 2022-08-18 | End: 2022-10-20

## 2022-08-18 ASSESSMENT — PATIENT HEALTH QUESTIONNAIRE - PHQ9
10. IF YOU CHECKED OFF ANY PROBLEMS, HOW DIFFICULT HAVE THESE PROBLEMS MADE IT FOR YOU TO DO YOUR WORK, TAKE CARE OF THINGS AT HOME, OR GET ALONG WITH OTHER PEOPLE: SOMEWHAT DIFFICULT
SUM OF ALL RESPONSES TO PHQ QUESTIONS 1-9: 7
SUM OF ALL RESPONSES TO PHQ QUESTIONS 1-9: 7

## 2022-08-18 NOTE — PROGRESS NOTES
"  Assessment & Plan     Lumbar radiculopathy    - MR Lumbar Spine w/o Contrast; Future    Paresthesia  - Orthopedic  Referral; Future  - Adult Neurology  Referral; Future  - CBC with platelets; Future  - Basic metabolic panel  (Ca, Cl, CO2, Creat, Gluc, K, Na, BUN); Future  - TSH with free T4 reflex; Future  - Lyme Disease Total Abs Bld with Reflex to Confirm CLIA; Future  - Erythrocyte sedimentation rate auto; Future  - Cyclic Citrullinated Peptide Antibody IgG; Future  - CRP inflammation; Future  - Anti Nuclear Luna IgG by IFA with Reflex; Future  - Rheumatoid factor; Future  - Vitamin B12; Future  - Folate; Future  - CBC with platelets  - Basic metabolic panel  (Ca, Cl, CO2, Creat, Gluc, K, Na, BUN)  - TSH with free T4 reflex  - Lyme Disease Total Abs Bld with Reflex to Confirm CLIA  - Erythrocyte sedimentation rate auto  - Cyclic Citrullinated Peptide Antibody IgG  - CRP inflammation  - Anti Nuclear Luna IgG by IFA with Reflex  - Rheumatoid factor  - Vitamin B12  - Folate    History of claustrophobia    - diazepam (VALIUM) 2 MG tablet; Take 1 tab by mouth 30-60 minutes prior to MRI, repeat in 1 hour as needed    Vitamin D deficiency    - Vitamin D Deficiency; Future  - Vitamin D Deficiency             BMI:   Estimated body mass index is 30.28 kg/m  as calculated from the following:    Height as of this encounter: 1.778 m (5' 10\").    Weight as of this encounter: 95.7 kg (211 lb).   Weight management plan: Patient was referred to their PCP to discuss a diet and exercise plan.    FURTHER TESTING:       - MRI - lumbar       - EMG    CONSULTATION/REFERRAL to NEURO    FUTURE APPOINTMENTS:       - Follow-up visit in case of new or worsening symptoms.     See Patient Instructions    No follow-ups on file.    OLEGARIO Bolaños CNP  [facility]   Akcy is a 61 year old, presenting for the following health issues:  Musculoskeletal Problem      History of " "Present Illness       Back Pain:  She presents for follow up of back pain. Patient's back pain is a recurring problem.  Location of back pain:  Right middle of back, right side of neck, left side of neck and left buttock  Description of back pain: stabbing  Back pain spreads: nowhere    Since patient first noticed back pain, pain is: gradually worsening  Does back pain interfere with her job:  Yes      Reason for visit:  The items I added as new Medical issuesShe consumes 1 sweetened beverage(s) daily.She exercises with enough effort to increase her heart rate 10 to 19 minutes per day.  She exercises with enough effort to increase her heart rate 3 or less days per week.   She is taking medications regularly.    Today's PHQ-9         PHQ-9 Total Score: 7    PHQ-9 Q9 Thoughts of better off dead/self-harm past 2 weeks :   Not at all    How difficult have these problems made it for you to do your work, take care of things at home, or get along with other people: Somewhat difficult       Feels like she is having acute nerve pain to left leg, bilateral lower back, right ankle, left arm. Does not have joint swelling. Denies rash. Did have a bug bite on her thigh recently.     Review of Systems   Constitutional, HEENT, cardiovascular, pulmonary, gi and gu systems are negative, except as otherwise noted.      Objective    /86   Pulse 95   Temp 97.9  F (36.6  C) (Tympanic)   Resp 16   Ht 1.778 m (5' 10\")   Wt 95.7 kg (211 lb)   SpO2 98%   BMI 30.28 kg/m    Body mass index is 30.28 kg/m .  Physical Exam   GENERAL: alert and no distress  MS: no gross musculoskeletal defects noted, no edema, negative straight leg raises  SKIN: no suspicious lesions or rashes  NEURO: Normal strength and tone, mentation intact and speech normal                    .  ..  "

## 2022-08-19 LAB
ANA PAT SER IF-IMP: ABNORMAL
ANA SER QL IF: POSITIVE
ANA TITR SER IF: ABNORMAL {TITER}
B BURGDOR IGG+IGM SER QL: 0.06
CCP AB SER IA-ACNC: 0.8 U/ML
RHEUMATOID FACT SER NEPH-ACNC: 7 IU/ML

## 2022-08-19 NOTE — RESULT ENCOUNTER NOTE
Snehal Maxwell    Your lab results came back within normal limits so far, still waiting for a couple. Please let us know if you have any questions.     Take care,    OLEGARIO Eubanks CNP

## 2022-08-22 NOTE — RESULT ENCOUNTER NOTE
Snehal Maxwell    Your antibody test that can suggest an autoimmune process is positive. There are false positives with this test but because of your symptoms I will have you see Rheumatology. Make sure this office is covered under your insurance. Dr. Villarreal is very good and patients can get in sooner than through [facility].     [facility]  [address]  [place] [address]    Please let us know if you have any questions.     Take care,    OLEGARIO Eubanks CNP

## 2022-08-24 ENCOUNTER — HOSPITAL ENCOUNTER (OUTPATIENT)
Dept: MRI IMAGING | Facility: CLINIC | Age: 62
Discharge: HOME | End: 2022-08-24
Attending: NURSE PRACTITIONER | Admitting: NURSE PRACTITIONER
Payer: COMMERCIAL

## 2022-08-24 DIAGNOSIS — M54.16 LUMBAR RADICULOPATHY: ICD-10-CM

## 2022-08-24 PROCEDURE — 72148 MRI LUMBAR SPINE W/O DYE: CPT

## 2022-08-26 NOTE — RESULT ENCOUNTER NOTE
Snehal Maxwell    Your MRI does not show any nerve impingements- there are some mild bulging discs but that doesn't really explain the symptoms you have. We'll see how the EMG comes out and what Rheumatology thinks. Please let us know if you have any questions.     Take care,    OLEGAROI Eubanks CNP

## 2022-09-13 ENCOUNTER — TRANSFERRED RECORDS (OUTPATIENT)
Dept: FAMILY MEDICINE | Facility: CLINIC | Age: 62
End: 2022-09-13

## 2022-09-14 ENCOUNTER — MYC MEDICAL ADVICE (OUTPATIENT)
Dept: FAMILY MEDICINE | Facility: CLINIC | Age: 62
End: 2022-09-14

## 2022-09-14 DIAGNOSIS — M54.16 LUMBAR RADICULOPATHY: Primary | ICD-10-CM

## 2022-09-15 ENCOUNTER — MYC MEDICAL ADVICE (OUTPATIENT)
Dept: FAMILY MEDICINE | Facility: CLINIC | Age: 62
End: 2022-09-15

## 2022-09-15 DIAGNOSIS — G56.21 IMPINGEMENT OF RIGHT ULNAR NERVE: Primary | ICD-10-CM

## 2022-09-15 RX ORDER — GABAPENTIN 300 MG/1
CAPSULE ORAL
Qty: 90 CAPSULE | Refills: 1 | Status: SHIPPED | OUTPATIENT
Start: 2022-09-15 | End: 2022-11-01

## 2022-09-26 ENCOUNTER — MYC MEDICAL ADVICE (OUTPATIENT)
Dept: FAMILY MEDICINE | Facility: CLINIC | Age: 62
End: 2022-09-26

## 2022-10-05 ENCOUNTER — TRANSFERRED RECORDS (OUTPATIENT)
Dept: FAMILY MEDICINE | Facility: CLINIC | Age: 62
End: 2022-10-05

## 2022-10-12 ENCOUNTER — MYC MEDICAL ADVICE (OUTPATIENT)
Dept: FAMILY MEDICINE | Facility: CLINIC | Age: 62
End: 2022-10-12

## 2022-10-12 NOTE — TELEPHONE ENCOUNTER
Snehal Pickard,     Not sure how to interpret this.     Please See my chart message and attached picture    Daniella Gibbs RN on 10/12/2022 at 9:30 AM

## 2022-10-17 ENCOUNTER — LAB (OUTPATIENT)
Dept: LAB | Facility: CLINIC | Age: 62
End: 2022-10-17
Payer: COMMERCIAL

## 2022-10-17 ENCOUNTER — TRANSFERRED RECORDS (OUTPATIENT)
Dept: FAMILY MEDICINE | Facility: CLINIC | Age: 62
End: 2022-10-17

## 2022-10-17 DIAGNOSIS — M54.50 LUMBAGO: ICD-10-CM

## 2022-10-17 DIAGNOSIS — R79.89 HYPOURICEMIA: Primary | ICD-10-CM

## 2022-10-17 LAB — ERYTHROCYTE [SEDIMENTATION RATE] IN BLOOD BY WESTERGREN METHOD: 35 MM/HR (ref 0–30)

## 2022-10-17 PROCEDURE — 86235 NUCLEAR ANTIGEN ANTIBODY: CPT | Mod: 59

## 2022-10-17 PROCEDURE — 86235 NUCLEAR ANTIGEN ANTIBODY: CPT

## 2022-10-17 PROCEDURE — 86225 DNA ANTIBODY NATIVE: CPT

## 2022-10-17 PROCEDURE — 85652 RBC SED RATE AUTOMATED: CPT

## 2022-10-17 PROCEDURE — 36415 COLL VENOUS BLD VENIPUNCTURE: CPT

## 2022-10-18 ENCOUNTER — TELEPHONE (OUTPATIENT)
Dept: FAMILY MEDICINE | Facility: CLINIC | Age: 62
End: 2022-10-18

## 2022-10-18 NOTE — LETTER
[facility]  [address]  [facility] [address]  Phone: [phone]  October 18, 2022      Alissa Moranon  [address] [address] [place] [id number]      If you have completed these tests at another facility, please call your clinic with the details about when, where, and the results, or have your records sent to our clinic so that we can best coordinate your care.     You are in particular need of attention regarding the following:     Schedule Annual MAMMOGRAPHY. [facility] scheduling number is [phone] or schedule in [facility] (self referral).  1 in 8 women will develop invasive breast cancer during her lifetime and it is the most common non-skin cancer in [place]. EARLY detection, new treatments, and a better understanding of the disease have increased survival rates- the 5 year survival rate in the 1960's was 63% and today it is close to 90%.  If you are under/uninsured, we recommend you contact the [facility]. They offer mammograms at no charge or on a sliding fee charge. You can schedule with them at [phone]. Please have them send us the results.   Call or MyChart message your clinic to schedule a colonoscopy, schedule/ a FIT Test, or order a Cologuard test. If you are unsure what type of test you need, please call your clinic and speak to clinic staff.   Colon cancer is now the second leading cause of cancer-related deaths in the [place] for both men and women and there are over 130,000 new cases and 50,000 deaths per year from colon cancer. Colonoscopies can prevent 90-95% of these deaths. Problem lesions can be removed before they ever become cancer. This test is not only looking for cancer, but also getting rid of precancerous lesions.   If you are under/uninsured, we recommend you contact the [employer] [facility] [facility].[employer] is a free colorectal cancer screening program that provides colonoscopies for eligible  under/uninsured [place] men and women. If you are interested in receiving a free colonoscopy, please call [employer] at t [phone] (mention code [employer]) to see if you're eligible. Please have them send us the results.   Schedule an office visit with your provider if you are interested in completing your colon cancer screening with a Cologuard test  PREVENTATIVE VISIT: Physical with pap smear    If you have already completed these items, please contact the clinic via phone or   [facility] so your care team can review and update your records. Thank you for   choosing [facility] for your healthcare needs. For any questions,   concerns, or to schedule an appointment please contact our clinic.    Healthy Regards,      Your [facility] Care Team

## 2022-10-18 NOTE — TELEPHONE ENCOUNTER
Patient Quality Outreach    Patient is due for the following:   Colon Cancer Screening  Breast Cancer Screening - Mammogram  Cervical Cancer Screening - PAP Needed  Physical Preventive Adult Physical    Next Steps:   Schedule a Adult Preventative    Type of outreach:    Sent letter.    Next Steps:  Reach out within 90 days via Letter.    Max number of attempts reached: No. Will try again in 90 days if patient still on fail list.    Questions for provider review:    None     Melissa Bell, [facility]

## 2022-10-19 ENCOUNTER — MYC MEDICAL ADVICE (OUTPATIENT)
Dept: FAMILY MEDICINE | Facility: CLINIC | Age: 62
End: 2022-10-19

## 2022-10-19 LAB
DSDNA AB SER-ACNC: 2.1 IU/ML
ENA SM IGG SER IA-ACNC: 0.7 U/ML
ENA SM IGG SER IA-ACNC: 0.7 U/ML
ENA SM IGG SER IA-ACNC: NEGATIVE
ENA SM IGG SER IA-ACNC: NEGATIVE
ENA SS-A AB SER IA-ACNC: 0.8 U/ML
ENA SS-A AB SER IA-ACNC: 0.8 U/ML
ENA SS-A AB SER IA-ACNC: NEGATIVE
ENA SS-A AB SER IA-ACNC: NEGATIVE
ENA SS-B IGG SER IA-ACNC: <0.6 U/ML
ENA SS-B IGG SER IA-ACNC: <0.6 U/ML
ENA SS-B IGG SER IA-ACNC: NEGATIVE
ENA SS-B IGG SER IA-ACNC: NEGATIVE
U1 SNRNP IGG SER IA-ACNC: 1.5 U/ML
U1 SNRNP IGG SER IA-ACNC: 1.5 U/ML
U1 SNRNP IGG SER IA-ACNC: NEGATIVE
U1 SNRNP IGG SER IA-ACNC: NEGATIVE

## 2022-10-19 NOTE — TELEPHONE ENCOUNTER
Please See my chart message question regarding pain.     Daniella Gibbs RN on 10/19/2022 at 10:33 AM

## 2022-11-01 ENCOUNTER — OFFICE VISIT (OUTPATIENT)
Dept: FAMILY MEDICINE | Facility: CLINIC | Age: 62
End: 2022-11-01
Payer: COMMERCIAL

## 2022-11-01 VITALS
DIASTOLIC BLOOD PRESSURE: 84 MMHG | HEIGHT: 70 IN | SYSTOLIC BLOOD PRESSURE: 128 MMHG | OXYGEN SATURATION: 98 % | RESPIRATION RATE: 16 BRPM | WEIGHT: 211.8 LBS | HEART RATE: 70 BPM | TEMPERATURE: 97.7 F | BODY MASS INDEX: 30.32 KG/M2

## 2022-11-01 DIAGNOSIS — M54.16 LUMBAR RADICULOPATHY: Primary | ICD-10-CM

## 2022-11-01 DIAGNOSIS — Z86.0100 HISTORY OF COLONIC POLYPS: ICD-10-CM

## 2022-11-01 DIAGNOSIS — I87.2 VENOUS INCOMPETENCE: ICD-10-CM

## 2022-11-01 DIAGNOSIS — M79.604 PAIN OF RIGHT LOWER EXTREMITY: ICD-10-CM

## 2022-11-01 DIAGNOSIS — E53.8 VITAMIN B 12 DEFICIENCY: ICD-10-CM

## 2022-11-01 DIAGNOSIS — L65.9 HAIR LOSS: ICD-10-CM

## 2022-11-01 LAB
ERYTHROCYTE [DISTWIDTH] IN BLOOD BY AUTOMATED COUNT: 13.3 % (ref 10–15)
HCT VFR BLD AUTO: 34 % (ref 35–47)
HGB BLD-MCNC: 11.3 G/DL (ref 11.7–15.7)
MCH RBC QN AUTO: 33.1 PG (ref 26.5–33)
MCHC RBC AUTO-ENTMCNC: 33.2 G/DL (ref 31.5–36.5)
MCV RBC AUTO: 100 FL (ref 78–100)
PLATELET # BLD AUTO: 259 10E3/UL (ref 150–450)
RBC # BLD AUTO: 3.41 10E6/UL (ref 3.8–5.2)
VIT B12 SERPL-MCNC: 461 PG/ML (ref 232–1245)
WBC # BLD AUTO: 4.7 10E3/UL (ref 4–11)

## 2022-11-01 PROCEDURE — 82607 VITAMIN B-12: CPT | Performed by: NURSE PRACTITIONER

## 2022-11-01 PROCEDURE — 36415 COLL VENOUS BLD VENIPUNCTURE: CPT | Performed by: NURSE PRACTITIONER

## 2022-11-01 PROCEDURE — 85027 COMPLETE CBC AUTOMATED: CPT | Performed by: NURSE PRACTITIONER

## 2022-11-01 PROCEDURE — 83921 ORGANIC ACID SINGLE QUANT: CPT | Performed by: NURSE PRACTITIONER

## 2022-11-01 PROCEDURE — 99214 OFFICE O/P EST MOD 30 MIN: CPT | Performed by: NURSE PRACTITIONER

## 2022-11-01 RX ORDER — GABAPENTIN 300 MG/1
300 CAPSULE ORAL 3 TIMES DAILY
Qty: 270 CAPSULE | Refills: 3 | Status: SHIPPED | OUTPATIENT
Start: 2022-11-01 | End: 2022-12-20

## 2022-11-01 NOTE — PROGRESS NOTES
Assessment & Plan     Lumbar radiculopathy    - gabapentin (NEURONTIN) 300 MG capsule; Take 1 capsule (300 mg) by mouth 3 times daily HOLD ON FILE until patient requests or is due for refill.    Hair loss    - Vitamin B12; Future  - Methylmalonic Acid; Future  - CBC with platelets; Future  - Vitamin B12  - Methylmalonic Acid  - CBC with platelets    Pain of right lower extremity    - US Venous Competency Right; Future    History of colonic polyps    - Colonoscopy Screening  Referral; Future             CONSULTATION/REFERRAL to - Follow up with your Ortho surgeon for the chronic lower leg pain and Rheumatology and Neurology as planned    FUTURE APPOINTMENTS:       - Follow-up for annual visit or as needed    See Patient Instructions    No follow-ups on file.    Time spent in chart review in preparation to see patient, time with patient for interview/exam, ordering medications/tests/and/or procedures, and time spent in charting and coordinating care: 30 minutes.     OLEGARIO Bolaños CNP  [facility]    Laura Woodruff is a 61 year old, presenting for the following health issues:  Musculoskeletal Problem      History of Present Illness       Back Pain:  She presents for follow up of back pain. Patient's back pain is a chronic problem.  Location of back pain:  Right lower back, left lower back, right middle of back, right buttock and left buttock  Description of back pain: sharp and shooting  Back pain spreads: right buttocks, left buttocks, right knee, right foot and left foot    Since patient first noticed back pain, pain is: gradually worsening  Does back pain interfere with her job:  Yes      Reason for visit:  Chronic pain from back to feet    She eats 0-1 servings of fruits and vegetables daily.She consumes 2 sweetened beverage(s) daily.She exercises with enough effort to increase her heart rate 9 or less minutes per day.  She exercises with enough effort to increase  "her heart rate 3 or less days per week.   She is taking medications regularly.       Pain History:  When did you first notice your pain? - broke leg 3 years ago  Have you seen anyone else for your pain? No  Where in your body do you have pain? Musculoskeletal problem/pain  Onset/Duration: 3 years   Description  Location: leg - right  Joint Swelling: one scar tends to swell - swelling to ankle and scars at the end of the day- compression stockings did not help  Redness: discoloration   Pain: YES- inner lower leg-burning, sharp pain in her knee, calf is throbbing, ankle-feels like needles   Warmth: No  Intensity:  7/10  Progression of Symptoms:  same  Accompanying signs and symptoms:   Fevers: No  Numbness/tingling/weakness: YES- numbness in right foot   History  Trauma to the area: YES- broke leg when she fell off her deck   Recent illness:  No  Previous similar problem: No  Previous evaluation:  YES  Precipitating or alleviating factors:  Aggravating factors include: walking, exercise and overuse  Therapies tried and outcome: gabapentin       Medication Followup of Gabapentin    Taking Medication as prescribed: yes    Side Effects:  None    Medication Helping Symptoms:  yes      Review of Systems   Constitutional, HEENT, cardiovascular, pulmonary, gi and gu systems are negative, except as otherwise noted.      Objective    /84   Pulse 70   Temp 97.7  F (36.5  C) (Tympanic)   Resp 16   Ht 1.778 m (5' 10\")   Wt 96.1 kg (211 lb 12.8 oz)   SpO2 98%   BMI 30.39 kg/m    Body mass index is 30.39 kg/m .  Physical Exam   GENERAL: healthy, alert and no distress  MS: RLE exam shows normal strength and muscle mass, no deformities and ROM of all joints is normal  SKIN: well healed surgical scars to RLE, keaton discoloration at inner right ankle  NEURO: Normal strength and tone, mentation intact and speech normal                    "

## 2022-11-09 RX ORDER — CYANOCOBALAMIN 1000 UG/ML
1000 INJECTION, SOLUTION INTRAMUSCULAR; SUBCUTANEOUS
Status: DISCONTINUED | OUTPATIENT
Start: 2022-11-09 | End: 2023-02-19

## 2022-11-09 NOTE — RESULT ENCOUNTER NOTE
Snehal Woodruff    Your B12 is at the low end of normal. The hemoglobin is down some. The other test for B 12 deficiency takes weeks- it will be a good baseline. I don't think you are absorbing the oral B12- I would like you to do a monthly B 12 injection for 3 months to see if we can bump up your B 12 levels and see any changes in hair thickness and the mild anemia. You can schedule that injection on the nurse schedule. We will recheck the levels then 1 month after the last (3 rd) injection. Please let us know if you have any questions.     Take care,    OLEGARIO Eubanks CNP

## 2022-11-10 LAB — METHYLMALONATE SERPL-SCNC: 0.31 UMOL/L (ref 0–0.4)

## 2022-11-10 NOTE — RESULT ENCOUNTER NOTE
Snehal Maxwell    Your methylmalonic acid is at the high end of normal- levels above the normal range can mean B 12 deficiency. Like I said, we will try a few months of shots and see how your blood counts and hair growth respond. Please let us know if you have any questions.     Take care,    OLEGARIO Eubanks CNP

## 2022-11-15 ENCOUNTER — ALLIED HEALTH/NURSE VISIT (OUTPATIENT)
Dept: FAMILY MEDICINE | Facility: CLINIC | Age: 62
End: 2022-11-15
Payer: COMMERCIAL

## 2022-11-15 DIAGNOSIS — E53.8 VITAMIN B 12 DEFICIENCY: Primary | ICD-10-CM

## 2022-11-15 PROCEDURE — 99207 PR NO CHARGE NURSE ONLY: CPT

## 2022-11-15 PROCEDURE — 96372 THER/PROPH/DIAG INJ SC/IM: CPT | Performed by: NURSE PRACTITIONER

## 2022-11-15 RX ADMIN — CYANOCOBALAMIN 1000 MCG: 1000 INJECTION, SOLUTION INTRAMUSCULAR; SUBCUTANEOUS at 14:02

## 2022-11-15 NOTE — PROGRESS NOTES
Clinic Administered Medication Documentation    Administrations This Visit     cyanocobalamin injection 1,000 mcg     Admin Date  11/15/2022 Action  Given Dose  1,000 mcg Route  Intramuscular Site  Right Deltoid Administered By  Melissa Bell CMA    Ordering Provider: Melly Roach APRN CNP    NDC: 83702-183-47    Lot#: [id number]    : Singularu    Patient Supplied?: No                  Injectable Medication Documentation    Patient was given Cyanocobalamin (B-12). Prior to medication administration, verified patients identity using patient s name and date of birth. Please see MAR and medication order for additional information. Patient instructed to remain in clinic for 15 minutes.      Was entire vial of medication used? Yes  Vial/Syringe: Single dose vial  Expiration Date:  4/24  Was this medication supplied by the patient? No

## 2022-11-21 ENCOUNTER — HEALTH MAINTENANCE LETTER (OUTPATIENT)
Age: 62
End: 2022-11-21

## 2022-11-23 ENCOUNTER — HOSPITAL ENCOUNTER (OUTPATIENT)
Dept: ULTRASOUND IMAGING | Facility: CLINIC | Age: 62
Discharge: HOME | End: 2022-11-23
Attending: NURSE PRACTITIONER | Admitting: NURSE PRACTITIONER
Payer: COMMERCIAL

## 2022-11-23 DIAGNOSIS — M79.604 PAIN OF RIGHT LOWER EXTREMITY: ICD-10-CM

## 2022-11-23 PROCEDURE — 93971 EXTREMITY STUDY: CPT | Mod: RT

## 2022-11-28 ENCOUNTER — TELEPHONE (OUTPATIENT)
Dept: GASTROENTEROLOGY | Facility: CLINIC | Age: 62
End: 2022-11-28

## 2022-11-28 NOTE — RESULT ENCOUNTER NOTE
Snehal Maxwell    Your ultrasound shows some incompetent veins in the right leg- It's possible that this is the reason for your leg discomfort. I will have you meet with Vascular to discuss your symptoms and possible treatments.     Diagnoses: Venous incompetence  Order: Vascular Surgery Referral     [facility]  [address] [facility] [address]  Phone: [phone]     Comment: Please be aware that coverage of these services is subject to the terms and limitations of your health insurance plan.  Call member services at your health plan with any benefit or coverage questions.    Please call to schedule your appointment      Please let us know if you have any questions.     Take care,    OLEGARIO Eubanks CNP

## 2022-11-28 NOTE — TELEPHONE ENCOUNTER
Screening Questions  BLUE  KIND OF PREP RED  LOCATION [review exclusion criteria] GREEN  SEDATION TYPE        Y Are you active on mychart?       Melly Roach, OLEGARIO CNP Ordering/Referring Provider?        NA What type of coverage do you have?      N Have you had a positive covid test in the last 14 days?     30.39 1. BMI  [BMI 40+ - review exclusion criteria]    N  2. Are you able to give consent for your medical care? [IF NO,RN REVIEW]        Y  3. Are you taking any prescription pain medications on a routine schedule?      GABAPENTIN-EXPERIENCING NO CONSTIPATION 3a. EXTENDED PREP What kind of prescription?     N 4. Do you have any chemical dependencies such as alcohol, street drugs, or methadone?    N 5. Do you have any history of post-traumatic stress syndrome, severe anxiety or history of psychosis?      **If yes 3- 5 , please schedule with MAC sedation.**          IF YES TO ANY 6 - 10 - HOSPITAL SETTING ONLY.     N 6.   Do you need assistance transferring?     N 7.   Have you had a heart or lung transplant?    N 8.   Are you currently on dialysis?   N 9.   Do you use daily home oxygen?   N 10. Do you take nitroglycerin?   10a. N If yes, how often?     11. [FEMALES]  N Are you currently pregnant?    11a. N If yes, how many weeks? [ Greater than 12 weeks, OR NEEDED]    N 12. Do you have Pulmonary Hypertension? *NEED PAC APPT AT [facility]*     N 13. [review exclusion criteria]  Do you have any implantable devices in your body (pacemaker, defib, LVAD)?    N 14. In the past 6 months, have you had any heart related issues including cardiomyopathy or heart attack?     14a. N If yes, did it require cardiac stenting if so when?     N 15. Have you had a stroke or Transient ischemic attack (TIA - aka  mini stroke ) within 6 months?      N 16. Do you have mod to severe Obstructive Sleep Apnea?  [Hospital only - Ok at [place]]    N 17. Do you have SEVERE AND UNCONTROLLED asthma? *NEED PAC APPT AT [facility]*     N 18.  "Are you currently taking any blood thinners?     18a. If yes, inform patient to \"follow up w/ ordering provider for bridging instructions.\"    N 19. Do you take the medication Phentermine?    19a. If yes, \"Hold for 7 days before procedure.  Please consult your prescribing provider if you have questions about holding this medication.\"     N  20. Do you have chronic kidney disease?      N  21. Do you have a diagnosis of diabetes?     N  22. On a regular basis do you go 3-5 days between bowel movements?     Y 23. Preferred LOCAL Pharmacy for Pre Prescription    [ LIST ONLY ONE PHARMACY]          [facility] - [place], [place] - [address]        - CLOSING REMINDERS -    Informed patient they will need an adult    Cannot take any type of public or medical transportation alone    Conscious Sedation- Needs  for 6 hours after the procedure       MAC/General-Needs  for 24 hours after procedure    Pre-Procedure Covid test to be completed [[facility] PCR Testing Required]    Confirmed Nurse will call to complete assessment       - SCHEDULING DETAILS -     DR. LOCKE  Surgeon    3/3/2023  Date of Procedure  Lower Endoscopy [Colonoscopy]  Type of Procedure Scheduled  [facility]-If you answer yes to questions #8, #20, #21Which Colonoscopy Prep was Sent?     MAC Sedation Type     NA PAC / Pre-op Required         Additional comments:            "

## 2022-11-29 ENCOUNTER — E-VISIT (OUTPATIENT)
Dept: FAMILY MEDICINE | Facility: CLINIC | Age: 62
End: 2022-11-29
Payer: COMMERCIAL

## 2022-11-29 DIAGNOSIS — J06.9 UPPER RESPIRATORY TRACT INFECTION, UNSPECIFIED TYPE: Primary | ICD-10-CM

## 2022-11-29 PROCEDURE — 99207 PR NON-BILLABLE SERV PER CHARTING: CPT | Performed by: NURSE PRACTITIONER

## 2022-11-29 NOTE — PATIENT INSTRUCTIONS
Thank you for choosing us for your care. I think an in-clinic visit would be best next steps based on your symptoms. Please schedule a clinic appointment; you won t be charged for this eVisit. I would be concerned that you may have developed a pneumonia and would recommend in person to get vitals and possible chest xray.    You can schedule an appointment right here in [facility], or call [phone]

## 2022-11-30 ENCOUNTER — OFFICE VISIT (OUTPATIENT)
Dept: FAMILY MEDICINE | Facility: CLINIC | Age: 62
End: 2022-11-30
Payer: COMMERCIAL

## 2022-11-30 VITALS
TEMPERATURE: 97.7 F | DIASTOLIC BLOOD PRESSURE: 82 MMHG | WEIGHT: 210 LBS | OXYGEN SATURATION: 97 % | RESPIRATION RATE: 20 BRPM | BODY MASS INDEX: 30.06 KG/M2 | HEIGHT: 70 IN | HEART RATE: 81 BPM | SYSTOLIC BLOOD PRESSURE: 120 MMHG

## 2022-11-30 DIAGNOSIS — J06.9 VIRAL URI WITH COUGH: Primary | ICD-10-CM

## 2022-11-30 PROCEDURE — 99213 OFFICE O/P EST LOW 20 MIN: CPT | Performed by: FAMILY MEDICINE

## 2022-11-30 RX ORDER — BENZONATATE 100 MG/1
100-200 CAPSULE ORAL 3 TIMES DAILY PRN
Qty: 30 CAPSULE | Refills: 0 | Status: SHIPPED | OUTPATIENT
Start: 2022-11-30 | End: 2023-02-17

## 2022-11-30 ASSESSMENT — ENCOUNTER SYMPTOMS: COUGH: 1

## 2022-11-30 ASSESSMENT — PATIENT HEALTH QUESTIONNAIRE - PHQ9
SUM OF ALL RESPONSES TO PHQ QUESTIONS 1-9: 9
SUM OF ALL RESPONSES TO PHQ QUESTIONS 1-9: 9
10. IF YOU CHECKED OFF ANY PROBLEMS, HOW DIFFICULT HAVE THESE PROBLEMS MADE IT FOR YOU TO DO YOUR WORK, TAKE CARE OF THINGS AT HOME, OR GET ALONG WITH OTHER PEOPLE: VERY DIFFICULT

## 2022-11-30 NOTE — PROGRESS NOTES
Assessment & Plan     Viral URI with cough  Reassuring exam, does seem to be improving  Reviewed return precautions, indications for abx, I don't think she needs them  we talked about viral testing (influenza and covid) and shared decision-making not to pursue as it wouldn't change treatment nor isolation that she is already doing  - benzonatate (TESSALON) 100 MG capsule  Dispense: 30 capsule; Refill: 0    Patient Instructions   Delsym is a good OTC cough suppressant        Return if symptoms worsen or fail to improve.    Bridgette Gurrola MD  [facility]            Laura Woodruff is a 61 year old accompanied by her self, presenting for the following health issues:  Cough (And possible chest xray) and Health Maintenance (Advised patient of . Colon Cancer screening scheduled.)    Patient states that she has a bump behind her left ear. Just noticed it last night. Is very sore around the bump, mostly when she touches it.  Patient has taken over the counter cough medicine. Doesn't seem to help her. Steam from the shower last night seems to have helped loosen up the mucus.    Cough    History of Present Illness       Reason for visit:  Check to make sure I don't have bronchitis.  Symptom onset:  3-7 days ago  Symptoms include:  Slept four days strait; coughing; dizzy; negative COVID tests; fever; headaches  Symptom intensity:  Moderate  Symptom progression:  Staying the same  Had these symptoms before:  No  What makes it worse:  Being out of bed  What makes it better:  Just sleepShe consumes 2 sweetened beverage(s) daily. She exercises with enough effort to increase her heart rate 3 or less days per week.   She is taking medications regularly.    Today's PHQ-9         PHQ-9 Total Score: 9    PHQ-9 Q9 Thoughts of better off dead/self-harm past 2 weeks :   Not at all    How difficult have these problems made it for you to do your work, take care of things at home, or get along with other  "people: Very difficult     Dry cough  She did use some steam last night and had some mucous production today  No sinus pressure      Review of Systems   Respiratory: Positive for cough.          Objective    /82 (BP Location: Right arm, Patient Position: Sitting, Cuff Size: Adult Regular)   Pulse 81   Temp 97.7  F (36.5  C) (Tympanic)   Resp 20   Ht 1.778 m (5' 10\")   Wt 95.3 kg (210 lb)   SpO2 97%   BMI 30.13 kg/m    Body mass index is 30.13 kg/m .  Physical Exam   GENERAL: healthy, alert and no distress  EYES: Eyes grossly normal to inspection, PERRL and conjunctivae and sclerae normal  FACE: non-tender to palpation over frontal and maxillary sinuses  HENT: ear canals normal, TMs normal boggy turbinates in nose, and mouth without ulcers or lesions but general oropharyngeal erythema present. No tonsillitis/exudate  NECK: no adenopathy, no asymmetry, masses, or scars and thyroid normal to palpation  RESP: sounds congested. normal respiratory effort, speaking in complete sentences  MS: no gross musculoskeletal defects noted, no edema  PSYCH: mentation appears normal, affect normal/bright                "

## 2022-12-05 ENCOUNTER — TELEPHONE (OUTPATIENT)
Dept: VASCULAR SURGERY | Facility: CLINIC | Age: 62
End: 2022-12-05

## 2022-12-05 NOTE — TELEPHONE ENCOUNTER
Patient calling to schedule a vascular consult for venous incompetency. There is a referral that was placed by Melly Roach that did not make it to our work que.  Please review and advise schedulers what imaging is needed and who to schedule with.  [facility]     Patient can be reached at: [phone]

## 2022-12-15 ENCOUNTER — ALLIED HEALTH/NURSE VISIT (OUTPATIENT)
Dept: FAMILY MEDICINE | Facility: CLINIC | Age: 62
End: 2022-12-15
Payer: COMMERCIAL

## 2022-12-15 DIAGNOSIS — E53.8 VITAMIN B 12 DEFICIENCY: Primary | ICD-10-CM

## 2022-12-15 PROCEDURE — 99207 PR NO CHARGE NURSE ONLY: CPT

## 2022-12-15 PROCEDURE — 96372 THER/PROPH/DIAG INJ SC/IM: CPT | Performed by: NURSE PRACTITIONER

## 2022-12-15 RX ADMIN — CYANOCOBALAMIN 1000 MCG: 1000 INJECTION, SOLUTION INTRAMUSCULAR; SUBCUTANEOUS at 08:26

## 2022-12-15 NOTE — PROGRESS NOTES
Clinic Administered Medication Documentation    Administrations This Visit     cyanocobalamin injection 1,000 mcg     Admin Date  12/15/2022 Action  Given Dose  1,000 mcg Route  Intramuscular Site  Right Deltoid Administered By  Melissa Bell CMA    Ordering Provider: Melly Roach APRN CNP    NDC: 89911-574-95    Lot#: [id number]    : [employer]    Patient Supplied?: No                  Injectable Medication Documentation    Patient was given Cyanocobalamin (B-12). Prior to medication administration, verified patients identity using patient s name and date of birth. Please see MAR and medication order for additional information. Patient instructed to remain in clinic for 15 minutes.      Was entire vial of medication used? Yes  Vial/Syringe: Single dose vial  Expiration Date:  5/24  Was this medication supplied by the patient? No

## 2022-12-20 ENCOUNTER — OFFICE VISIT (OUTPATIENT)
Dept: VASCULAR SURGERY | Facility: CLINIC | Age: 62
End: 2022-12-20
Attending: NURSE PRACTITIONER
Payer: COMMERCIAL

## 2022-12-20 VITALS — SYSTOLIC BLOOD PRESSURE: 131 MMHG | DIASTOLIC BLOOD PRESSURE: 79 MMHG | HEART RATE: 69 BPM | TEMPERATURE: 95.4 F

## 2022-12-20 DIAGNOSIS — I87.2 VENOUS INCOMPETENCE: ICD-10-CM

## 2022-12-20 PROCEDURE — 99203 OFFICE O/P NEW LOW 30 MIN: CPT | Performed by: PHYSICIAN ASSISTANT

## 2022-12-20 ASSESSMENT — PAIN SCALES - GENERAL: PAINLEVEL: NO PAIN (0)

## 2022-12-20 NOTE — PATIENT INSTRUCTIONS
Snehal Maxwell,    Thank you for entrusting your care with us today. After your visit today with ALPESH Benitez this is the plan that was discussed at your appointment.    We would like you to be wearing compression stockings as much as possible. Especially important with log car rides, sitting a lot and standing. I have completed a prescription for you with this.     We would like you to repeat your ultrasound in 3 months in [place].       I am including additional information on these things and our contact information if you have any questions or concerns.   Please do not hesitate to reach out to us if you felt we did not answer your questions or you are unsure of the treatment plan after your visit today. Our number is [phone].Thank you for trusting us with your care.         Again thank you for your time.     Lydia Caceres RN

## 2022-12-20 NOTE — NURSING NOTE
"Initial /79   Pulse 69   Temp (!) 95.4  F (35.2  C) (Tympanic)  Estimated body mass index is 30.13 kg/m  as calculated from the following:    Height as of 11/30/22: 1.778 m (5' 10\").    Weight as of 11/30/22: 95.3 kg (210 lb). .    Wilma Mcnamara LPN on 12/20/2022 at 8:54 AM    "

## 2022-12-21 NOTE — PROGRESS NOTES
"    VASCULAR SURGERY VEIN CLINIC CONSULTATION      LOCATION:  [facility]    Alissa Crawley  Medical Record #: [id number]  YOB: 1960  Age: 62 year old     Date of Service: 12/20/2022    PRIMARY CARE PROVIDER: Rosa Figueroa    Assessment:     61 YO female with stinging pains of the right lower extremity. Denies any classic symptoms of venous insufficiency, although ultrasound does show right GSV incompetence in the proximal and mid thigh. Not wearing compression.     Plan:     1. Treatment options of conservative therapy of stockings use, exercise, weight loss, elevating legs when possible.    2. Script for compression stockings 20-30 mmHg  3. Repeat venous competency ultrasound in [place] to evaluate right leg for incompetency of both deep and superficial system .   4. Follow up: 3 months   5. NSAID'S for pain control as needed    Subjective:      Alissa Crawley is a 62 year old female who was referred by Melly Roach CNP for evaluation of venous disease. Patient notes she fractured her right leg 2 years ago and required surgery with ortho. She did well initially but has had more pain to the right leg over the past year. In February, she had screws removed from the ankle as she thought this might help her symptoms. However, she continues to experience \"bee sting\" and shooting pains. Denies any visible varicose veins, lower extremity swelling, heaviness/faituge, or skin discoloration or wounds. No known family history of venous disease. Patient has had two pregnancies and sits with her legs down at a desk for most of the day. She wore compression stockings briefly after her orthopedic surgery two years ago but has not worn them since.     Patient underwent venous competency ultrasound showing superficial venous insufficiency in the right greater saphenous vein in the proximal thigh and mid thigh with maximum vein diameter of 9 mm at the junction. We discussed that her " symptoms are not the classic presentation of venous disease, but given her ultrasound findings will plan to continue treatment/further workup for venous insufficiency and evaluate response.     No other concerns.     Allergies:Patient has no known allergies.    Past Medical History:   Diagnosis Date     Chronic laryngitis 11/27/2020     Dysthymia 11/27/2020     Episodic cluster headache 11/27/2020     Gastroesophageal reflux disease 11/27/2020     Past Surgical History:   Procedure Laterality Date     CHOLECYSTECTOMY       HYSTERECTOMY, PAP NO LONGER INDICATED       OPEN REDUCTION INTERNAL FIXATION FIBULA Right 11/28/2020    Procedure: OPEN REDUCTION INTERNAL FIXATION, FRACTURE, FIBULA;  Surgeon: Jose Dawkins MD;  Location: [facility];  Service: Orthopedics     [facility] TREAT TIBIAL SHAFT FX, INTRAMED IMPLANT Right 11/28/2020    Procedure: OPEN REDUCTION INTERNAL FIXATION, FRACTURE, TIBIA, USING INTRAMEDULLARY HAYLIE;  Surgeon: Jose Dawkins MD;  Location: [facility];  Service: Orthopedics     Family History   Problem Relation Age of Onset     Arthritis Mother      Heart Disease Mother      Cancer Father         prostate     Prostate Cancer Father      Anesthesia Reaction Sister         Difficulty awakening     Clotting Disorder No family hx of       reports that she has never smoked. She has never used smokeless tobacco. She reports current alcohol use. She reports that she does not use drugs.    Review of Systems:  Pertinent items are noted in HPI.   Patient has symptomatic veins and changes of right legs. These have progressed to the point of causing symptoms on a daily basis. This causes issues with daily activities and chores such as exercising or walking for long periods.    Objective:     Vitals:    12/20/22 0853   BP: 131/79   Pulse: 69   Temp: (!) 95.4  F (35.2  C)   TempSrc: Tympanic     There is no height or weight on file to calculate BMI.    EXAM:  GENERAL: well-developed 62 year old  female who appears her stated age  HEAD: normocephalic  HEENT: pupils equal and reactive bilaterally  CHEST/LUNG: normal respiratory effort   NEUROLOGIC: focally intact, nonfocal, alert and oriented x 3  VASCULAR: no lower extremity edema, no skin changes or wounds consistent with chronic venous insufficiency, no visible veins or spider veins present     Denise Benitez PA-C   [facility]  [phone]  Fax: [phone]

## 2023-01-03 ENCOUNTER — MYC MEDICAL ADVICE (OUTPATIENT)
Dept: FAMILY MEDICINE | Facility: CLINIC | Age: 63
End: 2023-01-03

## 2023-01-03 NOTE — TELEPHONE ENCOUNTER
"Requested Prescriptions   Pending Prescriptions Disp Refills    verapamil ER (VERELAN) 120 MG 24 hr capsule 24 capsule 1     Sig: Take 1 capsule (120 mg) by mouth twice a week       Calcium Channel Blockers Protocol  Failed - 1/3/2023  4:54 PM        Failed - Normal ALT in past 12 months     No lab results found.          Passed - Blood pressure under 140/90 in past 12 months     BP Readings from Last 3 Encounters:   12/20/22 131/79   11/30/22 120/82   11/01/22 128/84                 Passed - Recent (12 mo) or future (30 days) visit within the authorizing provider's specialty     Patient has had an office visit with the authorizing provider or a provider within the authorizing providers department within the previous 12 mos or has a future within next 30 days. See \"Patient Info\" tab in inbasket, or \"Choose Columns\" in Meds & Orders section of the refill encounter.              Passed - Medication is active on med list        Passed - Patient is age 18 or older        Passed - No active pregnancy on record        Passed - Normal serum creatinine on file in past 12 months     Recent Labs   Lab Test 08/18/22  0951   CR 0.78       Ok to refill medication if creatinine is low          Passed - No positive pregnancy test in past 12 months             "

## 2023-01-04 NOTE — TELEPHONE ENCOUNTER
"Requested Prescriptions   Pending Prescriptions Disp Refills    verapamil ER (VERELAN) 120 MG 24 hr capsule 24 capsule 1     Sig: Take 1 capsule (120 mg) by mouth twice a week       Calcium Channel Blockers Protocol  Failed - 1/4/2023  4:29 PM        Failed - Normal ALT in past 12 months     No lab results found.          Passed - Blood pressure under 140/90 in past 12 months     BP Readings from Last 3 Encounters:   12/20/22 131/79   11/30/22 120/82   11/01/22 128/84                 Passed - Recent (12 mo) or future (30 days) visit within the authorizing provider's specialty     Patient has had an office visit with the authorizing provider or a provider within the authorizing providers department within the previous 12 mos or has a future within next 30 days. See \"Patient Info\" tab in inbasket, or \"Choose Columns\" in Meds & Orders section of the refill encounter.              Passed - Medication is active on med list        Passed - Patient is age 18 or older        Passed - No active pregnancy on record        Passed - Normal serum creatinine on file in past 12 months     Recent Labs   Lab Test 08/18/22  0951   CR 0.78       Ok to refill medication if creatinine is low          Passed - No positive pregnancy test in past 12 months             "

## 2023-01-05 RX ORDER — VERAPAMIL HYDROCHLORIDE 120 MG/1
120 CAPSULE, EXTENDED RELEASE ORAL
Qty: 24 CAPSULE | Refills: 1 | OUTPATIENT
Start: 2023-01-05

## 2023-01-17 DIAGNOSIS — F32.1 MAJOR DEPRESSIVE DISORDER, SINGLE EPISODE, MODERATE (H): Primary | ICD-10-CM

## 2023-01-17 DIAGNOSIS — G44.019 EPISODIC CLUSTER HEADACHE, NOT INTRACTABLE: ICD-10-CM

## 2023-01-17 NOTE — TELEPHONE ENCOUNTER
Medication Question or Refill    Contacts       Type Contact Phone/Fax    01/17/2023 11:07 AM CST Phone (Incoming) Kacy Crawley (Self) [phone] (H)          What medication are you calling about (include dose and sig)?: Pt calling to get Verapamil, and Sertraline perscriptions sent to [facility] in [place]. Needing a refill on Zoloft now also.    Controlled Substance Agreement on file:   CSA -- Patient Level:    CSA: None found at the patient level.         Do you need a refill? Yes:     Preferred Pharmacy:    [place] [facility] - [address] [address]  Phone: [phone] Fax: [phone]      [facility] or would you prefer to receive a phone call?:   Patient would prefer a phone call   Okay to leave a detailed message?: Yes at Cell number on file:    Telephone Information:   Mobile [phone]     .Paris Peck PSC

## 2023-01-18 RX ORDER — VERAPAMIL HYDROCHLORIDE 120 MG/1
120 CAPSULE, EXTENDED RELEASE ORAL
Qty: 24 CAPSULE | Refills: 3 | Status: SHIPPED | OUTPATIENT
Start: 2023-01-19 | End: 2024-02-01

## 2023-01-18 RX ORDER — SERTRALINE HYDROCHLORIDE 100 MG/1
200 TABLET, FILM COATED ORAL DAILY
Qty: 180 TABLET | Refills: 3 | Status: SHIPPED | OUTPATIENT
Start: 2023-01-18 | End: 2024-01-05

## 2023-01-20 ENCOUNTER — ALLIED HEALTH/NURSE VISIT (OUTPATIENT)
Dept: FAMILY MEDICINE | Facility: CLINIC | Age: 63
End: 2023-01-20
Payer: COMMERCIAL

## 2023-01-20 DIAGNOSIS — E53.8 VITAMIN B 12 DEFICIENCY: Primary | ICD-10-CM

## 2023-01-20 PROCEDURE — 99207 PR NO CHARGE NURSE ONLY: CPT

## 2023-01-20 PROCEDURE — 96372 THER/PROPH/DIAG INJ SC/IM: CPT | Performed by: NURSE PRACTITIONER

## 2023-01-20 RX ADMIN — CYANOCOBALAMIN 1000 MCG: 1000 INJECTION, SOLUTION INTRAMUSCULAR; SUBCUTANEOUS at 08:17

## 2023-01-20 NOTE — PROGRESS NOTES
Clinic Administered Medication Documentation    Administrations This Visit     cyanocobalamin injection 1,000 mcg     Admin Date  01/20/2023 Action  Given Dose  1,000 mcg Route  Intramuscular Site  Left Deltoid Administered By  Yamilka Maurer CMA    Ordering Provider: Melly Roach APRN CNP    Patient Supplied?: No                  Injectable Medication Documentation    Patient was given Cyanocobalamin (B-12). Prior to medication administration, verified patients identity using patient s name and date of birth. Please see MAR and medication order for additional information. Patient instructed to remain in clinic for 15 minutes and report any adverse reaction to staff immediately .      Was entire vial of medication used? Yes  Vial/Syringe: Single dose vial  Expiration Date:  04/2024  Was this medication supplied by the patient? No

## 2023-01-26 ENCOUNTER — OFFICE VISIT (OUTPATIENT)
Dept: NEUROLOGY | Facility: CLINIC | Age: 63
End: 2023-01-26
Attending: NURSE PRACTITIONER
Payer: COMMERCIAL

## 2023-01-26 ENCOUNTER — TRANSFERRED RECORDS (OUTPATIENT)
Dept: HEALTH INFORMATION MANAGEMENT | Facility: CLINIC | Age: 63
End: 2023-01-26

## 2023-01-26 VITALS
BODY MASS INDEX: 29.84 KG/M2 | HEART RATE: 67 BPM | WEIGHT: 208 LBS | DIASTOLIC BLOOD PRESSURE: 99 MMHG | SYSTOLIC BLOOD PRESSURE: 154 MMHG

## 2023-01-26 DIAGNOSIS — R41.3 MEMORY PROBLEM: ICD-10-CM

## 2023-01-26 DIAGNOSIS — R20.2 PARESTHESIA: ICD-10-CM

## 2023-01-26 DIAGNOSIS — R20.2 PAINFUL PARESTHESIA: Primary | ICD-10-CM

## 2023-01-26 DIAGNOSIS — R52 PAINFUL PARESTHESIA: Primary | ICD-10-CM

## 2023-01-26 PROCEDURE — 99205 OFFICE O/P NEW HI 60 MIN: CPT | Performed by: PSYCHIATRY & NEUROLOGY

## 2023-01-26 NOTE — NURSING NOTE
"Alissa Crawley is a 62 year old female who presents for:  Chief Complaint   Patient presents with     Neurologic Problem     Nerve pain  R foot numbness w/ leg pain/tingling; had leg surgery 2 yrs ago   L leg pain   SUZE hand/arm numbness/tingling        Initial Vitals:  BP (!) 154/99   Pulse 67   Wt 94.3 kg (208 lb)   BMI 29.84 kg/m   Estimated body mass index is 29.84 kg/m  as calculated from the following:    Height as of 11/30/22: 1.778 m (5' 10\").    Weight as of this encounter: 94.3 kg (208 lb).. Body surface area is 2.16 meters squared. BP completed using cuff size: wrist cuff    Nursing Comments: Kacy to follow up with Primary Care provider regarding elevated blood pressure.    Al Kern  "

## 2023-01-26 NOTE — Clinical Note
"    1/26/2023         RE: Alissa Crawley  [address] [address] [place] [id number]        Dear Colleague,    Thank you for referring your patient, Alissa Crawley, to the [facility] [place]. Please see a copy of my visit note below.    INITIAL NEUROLOGY CONSULTATION    DATE OF VISIT: 1/26/2023  MRN: [id number]  PATIENT NAME: Alissa Crawley  YOB: 1960    REFERRING PROVIDER: Melly Roach    Chief Complaint   Patient presents with     Neurologic Problem     Nerve pain  R foot numbness w/ leg pain/tingling; had leg surgery 2 yrs ago   L leg pain   SUZE hand/arm numbness/tingling       SUBJECTIVE:                                                      HPI:   Alissa Crawley is a 62 year old female whom I have been asked by Melly Roach CNP, to see in consultation for paresthesias.  Per chart review, the patient was also seen in vascular surgery clinic.  That note indicates she fractured her right leg a couple of years ago requiring surgery through orthopedics.  She started to develop pain in that leg over the past year or so.  She had some screws removed from her ankle in February 2022, but this did not decrease her pain.  Pain described as shooting pain, \"bee sting.\"  Testing of her veins showed some venous insufficiency of the right saphenous vein.  Vascular did not feel that her symptoms were consistent with venous disease.  Lumbar MRI from August 2022 did not show any significant stenosis.    Kacy says that she had some testing of the Left arm, in 9.2022, which showed ulnar neuropathy, for some finger numbness.  [facility] note reflects this.  No other findings on left upper extremity EMG.  She has not had the lower extremities completed.  She is doing therapy for ulnar neuropathy and says that symptoms are improving.   She says that her Right foot goes numb, and the ankle has needle-like pains. She has sharp pains in the calf. Driving flares her pain. She broke " "the bones in her lower leg and ankle. The numbness is new since surgery. She says that after she had the hardware taken out of her ankle, she had some complications with swelling. She says she has not noticed a change with the compression stockings. She says that she feels some nerve pain in her buttock which causes dull pain down into the leg. She does have occasional numbness in the Left foot, and some buttock pain on that side, worse than on the right. She has been trying to do some stretching and other exercises but this has not helped with her symptoms.    She has done 3 months B12 shots for 3 months (461 in 11.2022, 431 in 8.2022).  No notable benefit from these.    She has seen Rheumatology. Extensive inflammatory labs have been completed.  She had a mildly elevated GIOVANNY, mildly elevated ESR at 35.  TSH was normal, Lyme negative, normal LAURIE, RNP, double-stranded DNA, SSA, SSB and RF .    She also complains of cognitive fogginess. She relies more on notes. She lives with her . Her son complains about her memory, she comments \"everyone does.\"  Her face gets red when she drinks alcohol.     Past Medical History:   Diagnosis Date     Chronic laryngitis 11/27/2020     Dysthymia 11/27/2020     Episodic cluster headache 11/27/2020     Gastroesophageal reflux disease 11/27/2020     Past Surgical History:   Procedure Laterality Date     CHOLECYSTECTOMY       HYSTERECTOMY, PAP NO LONGER INDICATED       OPEN REDUCTION INTERNAL FIXATION FIBULA Right 11/28/2020    Procedure: OPEN REDUCTION INTERNAL FIXATION, FRACTURE, FIBULA;  Surgeon: Jose Dawkins MD;  Location: [facility];  Service: Orthopedics     [facility] TREAT TIBIAL SHAFT FX, INTRAMED IMPLANT Right 11/28/2020    Procedure: OPEN REDUCTION INTERNAL FIXATION, FRACTURE, TIBIA, USING INTRAMEDULLARY HAYLIE;  Surgeon: Jose Dawkins MD;  Location: [facility] OR;  Service: Orthopedics       benzonatate (TESSALON) 100 MG capsule, Take 1-2 capsules (100-200 " "mg) by mouth 3 times daily as needed for cough  calcium carbonate (OS-JAZZY) 600 mg calcium (1,500 mg) tablet, [CALCIUM CARBONATE (OS-JAZZY) 600 MG CALCIUM (1,500 MG) TABLET] Take 600 mg by mouth daily.  cyanocobalamin 1000 MCG tablet, Take 1,000 mcg by mouth daily  esomeprazole (NEXIUM) 20 MG capsule, Take 20 mg by mouth daily before breakfast  sertraline (ZOLOFT) 100 MG tablet, Take 2 tablets (200 mg) by mouth daily  verapamil ER (VERELAN) 120 MG 24 hr capsule, Take 1 capsule (120 mg) by mouth twice a week Take 120 mg by mouth 2 (two) times a week. In the PM - on  and Sundays    cyanocobalamin injection 1,000 mcg      No Known Allergies     Problem (# of Occurrences) Relation (Name,Age of Onset)    Anesthesia Reaction (1) Sister: Difficulty awakening    Arthritis (1) Mother    Cancer (1) Father: prostate    Heart Disease (1) Mother    Prostate Cancer (1) Father       Negative family history of: Clotting Disorder        Social History     Tobacco Use     Smoking status: Never     Smokeless tobacco: Never   Vaping Use     Vaping Use: Never used   Substance Use Topics     Alcohol use: Yes     Comment: Alcoholic Drinks/day: \"A drink or two on the weekend\"     Drug use: Never       REVIEW OF SYSTEMS:                                                      Headache today.  Otherwise 10-point review of systems is negative except as mentioned above in HPI.     EXAM:                                                      Physical Exam:   Vitals: BP (!) 154/99   Pulse 67   Wt 94.3 kg (208 lb)   BMI 29.84 kg/m    BMI= Body mass index is 29.84 kg/m .  GENERAL: NAD.  HEENT: NC/AT.   CV: RRR. S1, S2.   NECK: No bruits.  PULM: Non-labored breathing.   Neurologic:  MENTAL STATUS: Alert, attentive. Speech is fluent. Normal comprehension. Mini-co/5 (gets third word with letter cue). Normal concentration. Adequate fund of knowledge.   CRANIAL NERVES: Visual fields intact to confrontation. Pupils equally, round and reactive to " light. Facial sensation and movement normal. EOM full. Hearing intact to conversation. Trapezius strength intact. Palate moves symmetrically. Tongue midline.  MOTOR: 5/5 in proximal and distal muscle groups of upper and lower extremities. Tone and bulk normal.   DTRs: Intact and symmetric in biceps, BR, patellae.  Babinski down-going bilaterally.   SENSATION: Normal light touch and pinprick. Intact proprioception at great toes. Vibration: {tandem walkin} at both ankles.   COORDINATION: Normal finger nose finger. Finger tapping normal. Knee heel shin normal.  STATION AND GAIT: Romberg negative. Casual gait and tandem normal.  Right hand-dominant.    Relevant Data:  MRI Lumbar Spine (22):  FINDINGS:   Nomenclature is based on 5 lumbar type vertebral bodies. Normal vertebral body height. Mild retrolisthesis of L2 on L3. No suspicious bone marrow signal abnormality. Modic type I endplate changes at L1-L2. Normal distal spinal cord and cauda equina with   conus medullaris at the inferior aspect of L2. There is a thin fatty filum. No extraspinal abnormality. Unremarkable visualized bony pelvis.     T12-L1: Normal disc height and signal. No herniation. Normal facets. No spinal canal or neural foraminal stenosis.      L1-L2: Moderate loss of disc height and T2-weighted signal in the disc. No facet arthropathy, spinal canal or neural foraminal stenosis.     L2-L3: Moderate loss of disc height and T2-weighted signal in the disc. Mild disc bulge with a small central disc protrusion. No facet arthropathy or spinal canal stenosis. No neural foraminal stenosis.      L3-L4: Moderate loss of disc height and T2-weighted signal in the disc. Moderate disc bulge. Mild right lateral recess stenosis. Mild bilateral facet arthropathy. Mild spinal canal stenosis. No neural foraminal stenosis.     L4-L5: Mild loss of disc height and T2-weighted signal in the disc. Mild bilateral facet arthropathy. No spinal canal stenosis. No  right and mild left neural foraminal stenosis.     L5-S1: Mild loss of disc height and moderate loss of T2-weighted signal in the disc. Left subarticular fissure. Mild bilateral facet arthropathy. No spinal canal stenosis. No neural foraminal stenosis.                                                          IMPRESSION:  1.  No significant spinal canal or neural foraminal stenosis at any level.     2.  Mild Modic type II endplate changes at L1-L2.    US Venous competency right (11.23.22):  IMPRESSION:  1. No evidence for DVT.  2. Deep venous insufficiency in the distal right superficial femoral  vein.  3. Superficial venous insufficiency in the right greater saphenous  vein in the proximal thigh and mid thigh.     Imaging reviewed independently by me.  Agree with radiology read.      ASSESSMENT and PLAN:                                                      Assessment:     ICD-10-CM    1. Painful paresthesia  R20.2 MR Brain w/o & w Contrast    R52 EMG      2. Paresthesia  R20.2 Adult Neurology  Referral      3. Memory problem  R41.3            Ms. Crawley is a pleasant 62-year-old woman with history of right leg injury requiring surgery, here for right lower extremity pain and paresthesias in her other limbs as well.  She has several other concerns for me today.  We did briefly address the cognitive concerns, mini cog was normal.  We can do additional testing upon follow-up if needed.  I think we should do lower extremity nerve testing and given the diffuse any of her symptoms we will also check a brain MRI.  I think demyelinating disease is unlikely, especially with it being atypical for her age.  She does have evidence of a possible inflammatory process going on, which is being worked up by rheumatology.  I would like to see Kacy back in clinic in a few months, after her work-up is completed.  She understands and agrees with the plan.      Plan:  -- MRI of the brain.    -- Additional nerve conduction  studies/EMG, this time of the lower extremities.  -- We will notify you of the test results and next steps for evaluation/treatment.    -- I do agree with your plan to follow-up with rheumatology as well.  -- Return to neurology clinic in 4 months.    Total Time: 60 minutes were spent with the patient and in chart review/documentation (as described above in Assessment and Plan) /coordinating the care on date of service.    Linda George MD  Neurology    CC: Melly Roach CNP    Dragon software used in the dictation of this note.        Again, thank you for allowing me to participate in the care of your patient.        Sincerely,        Linda George MD

## 2023-01-26 NOTE — PROGRESS NOTES
"INITIAL NEUROLOGY CONSULTATION    DATE OF VISIT: 1/26/2023  MRN: [id number]  PATIENT NAME: Alissa Crawley  YOB: 1960    REFERRING PROVIDER: Melly Roach    Chief Complaint   Patient presents with     Neurologic Problem     Nerve pain  R foot numbness w/ leg pain/tingling; had leg surgery 2 yrs ago   L leg pain   SUZE hand/arm numbness/tingling       SUBJECTIVE:                                                      HPI:   Alissa Crawley is a 62 year old female whom I have been asked by Melly Roach CNP, to see in consultation for paresthesias.  Per chart review, the patient was also seen in vascular surgery clinic.  That note indicates she fractured her right leg a couple of years ago requiring surgery through orthopedics.  She started to develop pain in that leg over the past year or so.  She had some screws removed from her ankle in February 2022, but this did not decrease her pain.  Pain described as shooting pain, \"bee sting.\"  Testing of her veins showed some venous insufficiency of the right saphenous vein.  Vascular did not feel that her symptoms were consistent with venous disease.  Lumbar MRI from August 2022 did not show any significant stenosis.    Kacy says that she had some testing of the Left arm, in 9.2022, which showed ulnar neuropathy, for some finger numbness.  [facility] note reflects this.  No other findings on left upper extremity EMG.  She has not had the lower extremities completed.  She is doing therapy for ulnar neuropathy and says that symptoms are improving.   She says that her Right foot goes numb, and the ankle has needle-like pains. She has sharp pains in the calf. Driving flares her pain. She broke the bones in her lower leg and ankle. The numbness is new since surgery. She says that after she had the hardware taken out of her ankle, she had some complications with swelling. She says she has not noticed a change with the compression stockings. She says that she " "feels some nerve pain in her buttock which causes dull pain down into the leg. She does have occasional numbness in the Left foot, and some buttock pain on that side, worse than on the right. She has been trying to do some stretching and other exercises but this has not helped with her symptoms.    She has done 3 months B12 shots for 3 months (461 in 11.2022, 431 in 8.2022).  No notable benefit from these.    She has seen Rheumatology. Extensive inflammatory labs have been completed.  She had a mildly elevated GIOVANNY, mildly elevated ESR at 35.  TSH was normal, Lyme negative, normal LAURIE, RNP, double-stranded DNA, SSA, SSB and RF .    She also complains of cognitive fogginess. She relies more on notes. She lives with her . Her son complains about her memory, she comments \"everyone does.\"  Her face gets red when she drinks alcohol.     Past Medical History:   Diagnosis Date     Chronic laryngitis 11/27/2020     Dysthymia 11/27/2020     Episodic cluster headache 11/27/2020     Gastroesophageal reflux disease 11/27/2020     Past Surgical History:   Procedure Laterality Date     CHOLECYSTECTOMY       HYSTERECTOMY, PAP NO LONGER INDICATED       OPEN REDUCTION INTERNAL FIXATION FIBULA Right 11/28/2020    Procedure: OPEN REDUCTION INTERNAL FIXATION, FRACTURE, FIBULA;  Surgeon: Jose Dawkins MD;  Location: [facility];  Service: Orthopedics     [facility] TREAT TIBIAL SHAFT FX, INTRAMED IMPLANT Right 11/28/2020    Procedure: OPEN REDUCTION INTERNAL FIXATION, FRACTURE, TIBIA, USING INTRAMEDULLARY HAYLIE;  Surgeon: Jose Dawkins MD;  Location: [facility];  Service: Orthopedics       benzonatate (TESSALON) 100 MG capsule, Take 1-2 capsules (100-200 mg) by mouth 3 times daily as needed for cough  calcium carbonate (OS-JAZZY) 600 mg calcium (1,500 mg) tablet, [CALCIUM CARBONATE (OS-JAZZY) 600 MG CALCIUM (1,500 MG) TABLET] Take 600 mg by mouth daily.  cyanocobalamin 1000 MCG tablet, Take 1,000 mcg by mouth " "daily  esomeprazole (NEXIUM) 20 MG capsule, Take 20 mg by mouth daily before breakfast  sertraline (ZOLOFT) 100 MG tablet, Take 2 tablets (200 mg) by mouth daily  verapamil ER (VERELAN) 120 MG 24 hr capsule, Take 1 capsule (120 mg) by mouth twice a week Take 120 mg by mouth 2 (two) times a week. In the PM - on  and Sundays    cyanocobalamin injection 1,000 mcg      No Known Allergies     Problem (# of Occurrences) Relation (Name,Age of Onset)    Anesthesia Reaction (1) Sister: Difficulty awakening    Arthritis (1) Mother    Cancer (1) Father: prostate    Heart Disease (1) Mother    Prostate Cancer (1) Father       Negative family history of: Clotting Disorder        Social History     Tobacco Use     Smoking status: Never     Smokeless tobacco: Never   Vaping Use     Vaping Use: Never used   Substance Use Topics     Alcohol use: Yes     Comment: Alcoholic Drinks/day: \"A drink or two on the weekend\"     Drug use: Never       REVIEW OF SYSTEMS:                                                      Headache today.  Otherwise 10-point review of systems is negative except as mentioned above in HPI.     EXAM:                                                      Physical Exam:   Vitals: BP (!) 154/99   Pulse 67   Wt 94.3 kg (208 lb)   BMI 29.84 kg/m    BMI= Body mass index is 29.84 kg/m .  GENERAL: NAD.  HEENT: NC/AT.   CV: RRR. S1, S2.   NECK: No bruits.  PULM: Non-labored breathing.   Neurologic:  MENTAL STATUS: Alert, attentive. Speech is fluent. Normal comprehension. Mini-co/5 (gets third word with letter cue). Normal concentration. Adequate fund of knowledge.   CRANIAL NERVES: Visual fields intact to confrontation. Pupils equally, round and reactive to light. Facial sensation and movement normal. EOM full. Hearing intact to conversation. Trapezius strength intact. Palate moves symmetrically. Tongue midline.  MOTOR: 5/5 in proximal and distal muscle groups of upper and lower extremities. Tone and bulk normal. "   DTRs: Intact and symmetric in biceps, BR, patellae.  Babinski down-going bilaterally.   SENSATION: Normal light touch and pinprick, though she says the right foot feels different than the left. Intact proprioception at great toes. Vibration: Slightly decreased at both ankles.   COORDINATION: Normal finger nose finger. Finger tapping normal. Knee heel shin normal.  STATION AND GAIT: Romberg negative. Casual gait and tandem normal.  Right hand-dominant.    Relevant Data:  MRI Lumbar Spine (8.24.22):  FINDINGS:   Nomenclature is based on 5 lumbar type vertebral bodies. Normal vertebral body height. Mild retrolisthesis of L2 on L3. No suspicious bone marrow signal abnormality. Modic type I endplate changes at L1-L2. Normal distal spinal cord and cauda equina with   conus medullaris at the inferior aspect of L2. There is a thin fatty filum. No extraspinal abnormality. Unremarkable visualized bony pelvis.     T12-L1: Normal disc height and signal. No herniation. Normal facets. No spinal canal or neural foraminal stenosis.      L1-L2: Moderate loss of disc height and T2-weighted signal in the disc. No facet arthropathy, spinal canal or neural foraminal stenosis.     L2-L3: Moderate loss of disc height and T2-weighted signal in the disc. Mild disc bulge with a small central disc protrusion. No facet arthropathy or spinal canal stenosis. No neural foraminal stenosis.      L3-L4: Moderate loss of disc height and T2-weighted signal in the disc. Moderate disc bulge. Mild right lateral recess stenosis. Mild bilateral facet arthropathy. Mild spinal canal stenosis. No neural foraminal stenosis.     L4-L5: Mild loss of disc height and T2-weighted signal in the disc. Mild bilateral facet arthropathy. No spinal canal stenosis. No right and mild left neural foraminal stenosis.     L5-S1: Mild loss of disc height and moderate loss of T2-weighted signal in the disc. Left subarticular fissure. Mild bilateral facet arthropathy. No  spinal canal stenosis. No neural foraminal stenosis.                                                          IMPRESSION:  1.  No significant spinal canal or neural foraminal stenosis at any level.     2.  Mild Modic type II endplate changes at L1-L2.    US Venous competency right (11.23.22):  IMPRESSION:  1. No evidence for DVT.  2. Deep venous insufficiency in the distal right superficial femoral  vein.  3. Superficial venous insufficiency in the right greater saphenous  vein in the proximal thigh and mid thigh.     Imaging reviewed independently by me.  Agree with radiology read.      ASSESSMENT and PLAN:                                                      Assessment:     ICD-10-CM    1. Painful paresthesia  R20.2 MR Brain w/o & w Contrast    R52 EMG      2. Paresthesia  R20.2 Adult Neurology  Referral      3. Memory problem  R41.3            Ms. Crawley is a pleasant 62-year-old woman with history of right leg injury requiring surgery, here for right lower extremity pain and paresthesias in her other limbs as well.  She has several other concerns for me today.  We did briefly address the cognitive concerns, mini cog was normal.  We can do additional testing upon follow-up if needed.  I think we should do lower extremity nerve testing and given the diffuse any of her symptoms we will also check a brain MRI.  I think demyelinating disease is unlikely, especially with it being atypical for her age.  She does have evidence of a possible inflammatory process going on, which is being worked up by rheumatology.  I would like to see Kacy back in clinic in a few months, after her work-up is completed.  She understands and agrees with the plan.      Plan:  -- MRI of the brain.    -- Additional nerve conduction studies/EMG, this time of the lower extremities.  -- We will notify you of the test results and next steps for evaluation/treatment.    -- I do agree with your plan to follow-up with rheumatology as  well.  -- Return to neurology clinic in 4 months.    Total Time: 60 minutes were spent with the patient and in chart review/documentation (as described above in Assessment and Plan) /coordinating the care on date of service.    Linda George MD  Neurology    CC: Melly Roach CNP    Dragon software used in the dictation of this note.

## 2023-01-26 NOTE — PATIENT INSTRUCTIONS
Plan:  -- MRI of the brain.    -- Additional nerve conduction studies/EMG, this time of the lower extremities.  -- We will notify you of the test results and next steps for evaluation/treatment.    -- I do agree with your plan to follow-up with rheumatology as well.  -- Return to neurology clinic in 4 months.

## 2023-02-02 ENCOUNTER — HOSPITAL ENCOUNTER (OUTPATIENT)
Dept: MRI IMAGING | Facility: CLINIC | Age: 63
Discharge: HOME | End: 2023-02-02
Attending: PSYCHIATRY & NEUROLOGY | Admitting: PSYCHIATRY & NEUROLOGY
Payer: COMMERCIAL

## 2023-02-02 DIAGNOSIS — R20.2 PAINFUL PARESTHESIA: ICD-10-CM

## 2023-02-02 DIAGNOSIS — R52 PAINFUL PARESTHESIA: ICD-10-CM

## 2023-02-02 PROCEDURE — 70553 MRI BRAIN STEM W/O & W/DYE: CPT

## 2023-02-02 PROCEDURE — 255N000002 HC RX 255 OP 636: Performed by: PSYCHIATRY & NEUROLOGY

## 2023-02-02 PROCEDURE — A9585 GADOBUTROL INJECTION: HCPCS | Performed by: PSYCHIATRY & NEUROLOGY

## 2023-02-02 RX ORDER — GADOBUTROL 604.72 MG/ML
9.5 INJECTION INTRAVENOUS ONCE
Status: COMPLETED | OUTPATIENT
Start: 2023-02-02 | End: 2023-02-02

## 2023-02-02 RX ADMIN — GADOBUTROL 9.5 ML: 604.72 INJECTION INTRAVENOUS at 08:06

## 2023-02-17 ENCOUNTER — ANESTHESIA EVENT (OUTPATIENT)
Dept: GASTROENTEROLOGY | Facility: CLINIC | Age: 63
End: 2023-02-17
Payer: COMMERCIAL

## 2023-02-17 ENCOUNTER — OFFICE VISIT (OUTPATIENT)
Dept: FAMILY MEDICINE | Facility: CLINIC | Age: 63
End: 2023-02-17
Payer: COMMERCIAL

## 2023-02-17 VITALS
OXYGEN SATURATION: 97 % | HEART RATE: 65 BPM | RESPIRATION RATE: 16 BRPM | BODY MASS INDEX: 29.01 KG/M2 | DIASTOLIC BLOOD PRESSURE: 88 MMHG | SYSTOLIC BLOOD PRESSURE: 134 MMHG | TEMPERATURE: 97 F | WEIGHT: 202.6 LBS | HEIGHT: 70 IN

## 2023-02-17 DIAGNOSIS — E53.8 VITAMIN B 12 DEFICIENCY: Primary | ICD-10-CM

## 2023-02-17 DIAGNOSIS — M54.16 LUMBAR RADICULOPATHY: ICD-10-CM

## 2023-02-17 DIAGNOSIS — D51.8 MACROCYTIC ANEMIA WITH VITAMIN B12 DEFICIENCY: ICD-10-CM

## 2023-02-17 DIAGNOSIS — G47.19 EXCESSIVE DAYTIME SLEEPINESS: ICD-10-CM

## 2023-02-17 DIAGNOSIS — Z13.220 SCREENING FOR HYPERLIPIDEMIA: ICD-10-CM

## 2023-02-17 LAB
CHOLEST SERPL-MCNC: 177 MG/DL
CRP SERPL-MCNC: <3 MG/L
ERYTHROCYTE [DISTWIDTH] IN BLOOD BY AUTOMATED COUNT: 14.2 % (ref 10–15)
ERYTHROCYTE [SEDIMENTATION RATE] IN BLOOD BY WESTERGREN METHOD: 29 MM/HR (ref 0–30)
FERRITIN SERPL-MCNC: 14 NG/ML (ref 11–328)
HCT VFR BLD AUTO: 31.7 % (ref 35–47)
HDLC SERPL-MCNC: 49 MG/DL
HGB BLD-MCNC: 10.3 G/DL (ref 11.7–15.7)
IRON BINDING CAPACITY (ROCHE): 389 UG/DL (ref 240–430)
IRON SATN MFR SERPL: 15 % (ref 15–46)
IRON SERPL-MCNC: 59 UG/DL (ref 37–145)
LDLC SERPL CALC-MCNC: 104 MG/DL
MCH RBC QN AUTO: 33 PG (ref 26.5–33)
MCHC RBC AUTO-ENTMCNC: 32.5 G/DL (ref 31.5–36.5)
MCV RBC AUTO: 102 FL (ref 78–100)
NONHDLC SERPL-MCNC: 128 MG/DL
PLATELET # BLD AUTO: 235 10E3/UL (ref 150–450)
RBC # BLD AUTO: 3.12 10E6/UL (ref 3.8–5.2)
TRIGL SERPL-MCNC: 122 MG/DL
VIT B12 SERPL-MCNC: 560 PG/ML (ref 232–1245)
WBC # BLD AUTO: 3.7 10E3/UL (ref 4–11)

## 2023-02-17 PROCEDURE — 82728 ASSAY OF FERRITIN: CPT | Performed by: NURSE PRACTITIONER

## 2023-02-17 PROCEDURE — 86140 C-REACTIVE PROTEIN: CPT | Performed by: NURSE PRACTITIONER

## 2023-02-17 PROCEDURE — 86038 ANTINUCLEAR ANTIBODIES: CPT | Performed by: NURSE PRACTITIONER

## 2023-02-17 PROCEDURE — 86225 DNA ANTIBODY NATIVE: CPT | Performed by: NURSE PRACTITIONER

## 2023-02-17 PROCEDURE — 86039 ANTINUCLEAR ANTIBODIES (ANA): CPT | Performed by: NURSE PRACTITIONER

## 2023-02-17 PROCEDURE — 86235 NUCLEAR ANTIGEN ANTIBODY: CPT | Mod: 59 | Performed by: NURSE PRACTITIONER

## 2023-02-17 PROCEDURE — 86364 TISS TRNSGLTMNASE EA IG CLAS: CPT | Performed by: NURSE PRACTITIONER

## 2023-02-17 PROCEDURE — 99207 E-CONSULT TO SLEEP MEDICINE (ADULT OUTPT PROVIDER TO SPECIALIST WRITTEN QUESTION & RESPONSE): CPT | Performed by: NURSE PRACTITIONER

## 2023-02-17 PROCEDURE — 83540 ASSAY OF IRON: CPT | Performed by: NURSE PRACTITIONER

## 2023-02-17 PROCEDURE — 85652 RBC SED RATE AUTOMATED: CPT | Performed by: NURSE PRACTITIONER

## 2023-02-17 PROCEDURE — 80061 LIPID PANEL: CPT | Performed by: NURSE PRACTITIONER

## 2023-02-17 PROCEDURE — 36415 COLL VENOUS BLD VENIPUNCTURE: CPT | Performed by: NURSE PRACTITIONER

## 2023-02-17 PROCEDURE — 83550 IRON BINDING TEST: CPT | Performed by: NURSE PRACTITIONER

## 2023-02-17 PROCEDURE — 82607 VITAMIN B-12: CPT | Performed by: NURSE PRACTITIONER

## 2023-02-17 PROCEDURE — 86235 NUCLEAR ANTIGEN ANTIBODY: CPT | Performed by: NURSE PRACTITIONER

## 2023-02-17 PROCEDURE — 99214 OFFICE O/P EST MOD 30 MIN: CPT | Performed by: NURSE PRACTITIONER

## 2023-02-17 PROCEDURE — 85027 COMPLETE CBC AUTOMATED: CPT | Performed by: NURSE PRACTITIONER

## 2023-02-17 RX ORDER — GABAPENTIN 300 MG/1
300 CAPSULE ORAL 3 TIMES DAILY
Qty: 90 CAPSULE | Refills: 3 | Status: SHIPPED | OUTPATIENT
Start: 2023-02-17 | End: 2023-06-20

## 2023-02-17 ASSESSMENT — PATIENT HEALTH QUESTIONNAIRE - PHQ9
10. IF YOU CHECKED OFF ANY PROBLEMS, HOW DIFFICULT HAVE THESE PROBLEMS MADE IT FOR YOU TO DO YOUR WORK, TAKE CARE OF THINGS AT HOME, OR GET ALONG WITH OTHER PEOPLE: SOMEWHAT DIFFICULT
SUM OF ALL RESPONSES TO PHQ QUESTIONS 1-9: 8
SUM OF ALL RESPONSES TO PHQ QUESTIONS 1-9: 8

## 2023-02-17 NOTE — PROGRESS NOTES
Assessment & Plan     Vitamin B 12 deficiency  Labs pending  - CBC with platelets; Future  - Vitamin B12; Future  - Tissue transglutaminase eulalio IgA and IgG; Future  - ESR: Erythrocyte sedimentation rate; Future  - CRP inflammation; Future  - CBC with platelets; Future  - Anti Nuclear Elualio IgG by IFA with Reflex; Future  - LAURIE antibody panel; Future  - Leiva LAURIE Antibody IgG; Future  - SSA Ro LAURIE Antibody IgG; Future  - SSB La LAURIE Antibody IgG; Future  - RNP Antibody IgG; Future  - DNA double stranded antibodies; Future  - CBC with platelets  - Vitamin B12  - Tissue transglutaminase eulalio IgA and IgG  - ESR: Erythrocyte sedimentation rate  - CRP inflammation  - Anti Nuclear Eulalio IgG by IFA with Reflex  - LAURIE antibody panel  - Smith LAURIE Antibody IgG  - SSA Ro LAURIE Antibody IgG  - SSB La LAURIE Antibody IgG  - RNP Antibody IgG  - DNA double stranded antibodies    Lumbar radiculopathy  Restart Gabapentin  - gabapentin (NEURONTIN) 300 MG capsule; Take 1 capsule (300 mg) by mouth 3 times daily  - ESR: Erythrocyte sedimentation rate; Future  - CRP inflammation; Future  - CBC with platelets; Future  - Anti Nuclear Eulalio IgG by IFA with Reflex; Future  - LAURIE antibody panel; Future  - Leiva LAURIE Antibody IgG; Future  - SSA Ro LAURIE Antibody IgG; Future  - SSB La LAURIE Antibody IgG; Future  - RNP Antibody IgG; Future  - DNA double stranded antibodies; Future  - ESR: Erythrocyte sedimentation rate  - CRP inflammation  - Anti Nuclear Eulalio IgG by IFA with Reflex  - LAURIE antibody panel  - Smith LAURIE Antibody IgG  - SSA Ro LAURIE Antibody IgG  - SSB La LAURIE Antibody IgG  - RNP Antibody IgG  - DNA double stranded antibodies    Screening for hyperlipidemia    - Lipid panel reflex to direct LDL Non-fasting; Future  - ESR: Erythrocyte sedimentation rate; Future  - CRP inflammation; Future  - CBC with platelets; Future  - Anti Nuclear Eulalio IgG by IFA with Reflex; Future  - LAURIE antibody panel; Future  - Leiva LAURIE Antibody IgG; Future  - SSA Ro LAURIE Antibody  "IgG; Future  - SSB La LAURIE Antibody IgG; Future  - RNP Antibody IgG; Future  - DNA double stranded antibodies; Future  - Lipid panel reflex to direct LDL Non-fasting  - ESR: Erythrocyte sedimentation rate  - CRP inflammation  - Anti Nuclear Luna IgG by IFA with Reflex  - LAURIE antibody panel  - Smith LAURIE Antibody IgG  - SSA Ro LAURIE Antibody IgG  - SSB La LAURIE Antibody IgG  - RNP Antibody IgG  - DNA double stranded antibodies    Macrocytic anemia with vitamin B12 deficiency    - Adult GI  Referral - Procedure Only; Future    Excessive daytime sleepiness    - Adult E-Consult to Sleep Medicine (Outpt Provider to Specialist Written Question & Response)             BMI:   Estimated body mass index is 29.07 kg/m  as calculated from the following:    Height as of this encounter: 1.778 m (5' 10\").    Weight as of this encounter: 91.9 kg (202 lb 9.6 oz).   Weight management plan: Discussed healthy diet and exercise guidelines    FURTHER TESTING:       - EGD    CONSULTATION/REFERRAL to SLEEP    FUTURE APPOINTMENTS:       - Follow-up visit in 1-2 months after scopes    No follow-ups on file.    Time spent in chart review in preparation to see patient, time with patient for interview/exam, ordering medications/tests/and/or procedures, and time spent in charting and coordinating care: 35 minutes.     OLEGARIO Bolaños CNP  [facility] is a 62 year old, presenting for the following health issues:  RECHECK (b12) and Back Pain      History of Present Illness       Reason for visit:  Additional blood tests, get pain meds, and check-in on existing issues    She eats 0-1 servings of fruits and vegetables daily.She consumes 2 sweetened beverage(s) daily.She exercises with enough effort to increase her heart rate 9 or less minutes per day.  She exercises with enough effort to increase her heart rate 3 or less days per week.   She is taking medications regularly.    Today's " PHQ-9         PHQ-9 Total Score: 8    PHQ-9 Q9 Thoughts of better off dead/self-harm past 2 weeks :   Not at all    How difficult have these problems made it for you to do your work, take care of things at home, or get along with other people: Somewhat difficult       Pain History:  When did you first notice your pain? - Chronic Pain   Have you seen this provider for your pain in the past?   Yes   Where in your body do you have pain? Back, right leg, left buttocks. Daughter has throat cancer and she will be babysitting her grandchildren and would like to get back on pain meds for the next 6 weeks to help with her back pain.   Are you seeing anyone else for your pain? 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 n/a    - When did you take your last dose of pain medicine?  n/a   Adverse effects: No   PDMP Review     None        Last CSA Agreement:   CSA -- Patient Level:    CSA: None found at the patient level.           ADDITIONAL PROVIDER NOTES:   Having a lot of fatigue. Previously thought to be associated with the thinning hair and chronic pain she is having. She is seeing Rheumatology and Neurology and Vascular- nothing concrete is being found as the cause for her underlying symptoms. She has been taking vitamin B12 for the last 2-3 months with no improvement in hair or energy. Reports that she snores loudly, witnessed by . She does not feel rested in the morning. She will wake up in the middle of the night gasping for air. She has excessive  "daytime sleepiness. Son and Father both with LIZETTE.     Review of Systems   Constitutional, HEENT, cardiovascular, pulmonary, gi and gu systems are negative, except as otherwise noted.      Objective    /88   Pulse 65   Temp 97  F (36.1  C) (Tympanic)   Resp 16   Ht 1.778 m (5' 10\")   Wt 91.9 kg (202 lb 9.6 oz)   SpO2 97%   BMI 29.07 kg/m    Body mass index is 29.07 kg/m .  Physical Exam   GENERAL: healthy, alert and no distress  EYES: Eyes grossly normal to inspection and conjunctivae and sclerae normal  NEURO: Normal strength and tone, mentation intact and speech normal  PSYCH: mentation appears normal, affect normal/bright                    "

## 2023-02-17 NOTE — ANESTHESIA PREPROCEDURE EVALUATION
"Anesthesia Pre-Procedure Evaluation    Patient: Alissa Crawley   MRN: [id number] : 1960        Procedure : Procedure(s):  COLONOSCOPY          Past Medical History:   Diagnosis Date     Chronic laryngitis 2020     Dysthymia 2020     Episodic cluster headache 2020     Gastroesophageal reflux disease 2020      Past Surgical History:   Procedure Laterality Date     CHOLECYSTECTOMY       HYSTERECTOMY, PAP NO LONGER INDICATED       OPEN REDUCTION INTERNAL FIXATION FIBULA Right 2020    Procedure: OPEN REDUCTION INTERNAL FIXATION, FRACTURE, FIBULA;  Surgeon: Jose Dawkins MD;  Location: [facility];  Service: Orthopedics     [facility] TREAT TIBIAL SHAFT FX, INTRAMED IMPLANT Right 2020    Procedure: OPEN REDUCTION INTERNAL FIXATION, FRACTURE, TIBIA, USING INTRAMEDULLARY HAYLIE;  Surgeon: Jose Dawkins MD;  Location: [facility];  Service: Orthopedics      No Known Allergies   Social History     Tobacco Use     Smoking status: Never     Smokeless tobacco: Never   Substance Use Topics     Alcohol use: Yes     Comment: Alcoholic Drinks/day: \"A drink or two on the weekend\"      Wt Readings from Last 1 Encounters:   23 91.9 kg (202 lb 9.6 oz)        Anesthesia Evaluation   Pt has had prior anesthetic. Type: General.        ROS/MED HX  ENT/Pulmonary:  - neg pulmonary ROS     Neurologic:  - neg neurologic ROS   (+) delerium,     Cardiovascular:  - neg cardiovascular ROS     METS/Exercise Tolerance:     Hematologic:  - neg hematologic  ROS     Musculoskeletal:  - neg musculoskeletal ROS     GI/Hepatic:  - neg GI/hepatic ROS   (+) GERD,     Renal/Genitourinary:  - neg Renal ROS     Endo:  - neg endo ROS     Psychiatric/Substance Use:  - neg psychiatric ROS     Infectious Disease:  - neg infectious disease ROS     Malignancy:  - neg malignancy ROS     Other:            Physical Exam    Airway        Mallampati: II   TM distance: > 3 FB   Neck ROM: full   Mouth opening: " > 3 cm    Respiratory Devices and Support  Comment: Not on oxygen currently       Dental           Cardiovascular   cardiovascular exam normal          Pulmonary   pulmonary exam normal                OUTSIDE LABS:  CBC:   Lab Results   Component Value Date    WBC 3.7 (L) 02/17/2023    WBC 4.7 11/01/2022    HGB 10.3 (L) 02/17/2023    HGB 11.3 (L) 11/01/2022    HCT 31.7 (L) 02/17/2023    HCT 34.0 (L) 11/01/2022     02/17/2023     11/01/2022     BMP:   Lab Results   Component Value Date     08/18/2022     11/27/2020    POTASSIUM 4.2 08/18/2022    POTASSIUM 4.1 11/27/2020    CHLORIDE 105 08/18/2022    CHLORIDE 107 11/27/2020    CO2 26 08/18/2022    CO2 24 11/27/2020    BUN 20 08/18/2022    BUN 17 11/27/2020    CR 0.78 08/18/2022    CR 0.98 11/27/2020     (H) 08/18/2022    GLC 92 11/27/2020     COAGS:   Lab Results   Component Value Date    PTT 24 11/27/2020    INR 1.04 11/27/2020     POC: No results found for: BGM, HCG, HCGS  HEPATIC: No results found for: ALBUMIN, PROTTOTAL, ALT, AST, GGT, ALKPHOS, BILITOTAL, BILIDIRECT, FARHAD  OTHER:   Lab Results   Component Value Date    JAZZY 9.0 08/18/2022    TSH 1.76 08/18/2022    CRP <2.9 08/18/2022    SED 29 02/17/2023       Anesthesia Plan    ASA Status:  3      Anesthesia Type: General.   Induction: Intravenous, Propofol.   Maintenance: TIVA.        Consents    Anesthesia Plan(s) and associated risks, benefits, and realistic alternatives discussed. Questions answered and patient/representative(s) expressed understanding.     - Discussed: Risks, Benefits and Alternatives for BOTH SEDATION and the PROCEDURE were discussed     - Discussed with:  Patient         Postoperative Care    Pain management: IV analgesics, Oral pain medications, Multi-modal analgesia.   PONV prophylaxis: Ondansetron (or other 5HT-3), Dexamethasone or Solumedrol, Background Propofol Infusion     Comments:    Other Comments: Patient aware of plan, what to expect, and  potential risks. Timeout was performed before bringing the patient back to OR, and once again, patient was asked if they had any questions            OLEGARIO Seth CRNA

## 2023-02-17 NOTE — RESULT ENCOUNTER NOTE
Snehal Woodruff    Your blood counts are worse... I added on a upper scope to look at the stomach to make sure you don't have ulcers/inflammation etc. Please call the same day surgery scheduling at the hospital and let them know you will need the upper scope also done at the time you get your colonoscopy. Follow up with me after your scopes and we will recheck the labs. Please let us know if you have any questions.     Take care,    OLEGARIO Eubanks CNP

## 2023-02-19 NOTE — RESULT ENCOUNTER NOTE
Snehal Maxwell    Your cholesterol panel looks good. The iron levels are in normal range. The injections did not make much difference, we will stop those and continue the oral B12. Please let us know if you have any questions.     Take care,    OLEGARIO Eubanks CNP

## 2023-02-20 ENCOUNTER — MYC MEDICAL ADVICE (OUTPATIENT)
Dept: FAMILY MEDICINE | Facility: CLINIC | Age: 63
End: 2023-02-20
Payer: COMMERCIAL

## 2023-02-20 DIAGNOSIS — D51.8 MACROCYTIC ANEMIA WITH VITAMIN B12 DEFICIENCY: Primary | ICD-10-CM

## 2023-02-21 LAB
ANA PAT SER IF-IMP: ABNORMAL
ANA SER QL IF: ABNORMAL
ANA TITR SER IF: ABNORMAL {TITER}
DSDNA AB SER-ACNC: 1.9 IU/ML
ENA SM IGG SER IA-ACNC: 0.9 U/ML
ENA SM IGG SER IA-ACNC: <0.7 U/ML
ENA SM IGG SER IA-ACNC: NEGATIVE
ENA SM IGG SER IA-ACNC: NEGATIVE
ENA SS-A AB SER IA-ACNC: 0.6 U/ML
ENA SS-A AB SER IA-ACNC: 0.6 U/ML
ENA SS-A AB SER IA-ACNC: NEGATIVE
ENA SS-A AB SER IA-ACNC: NEGATIVE
ENA SS-B IGG SER IA-ACNC: <0.6 U/ML
ENA SS-B IGG SER IA-ACNC: <0.6 U/ML
ENA SS-B IGG SER IA-ACNC: NEGATIVE
ENA SS-B IGG SER IA-ACNC: NEGATIVE
TTG IGA SER-ACNC: 1.8 U/ML
TTG IGG SER-ACNC: 1.3 U/ML
U1 SNRNP IGG SER IA-ACNC: 1.3 U/ML
U1 SNRNP IGG SER IA-ACNC: <1.1 U/ML
U1 SNRNP IGG SER IA-ACNC: NEGATIVE
U1 SNRNP IGG SER IA-ACNC: NEGATIVE

## 2023-02-21 NOTE — RESULT ENCOUNTER NOTE
Snehal Maxwell    Your gluten allergy screening is negative. Please let us know if you have any questions.     Take care,    OLEGARIO Eubanks CNP

## 2023-02-23 RX ORDER — BISACODYL 5 MG
TABLET, DELAYED RELEASE (ENTERIC COATED) ORAL
Qty: 4 TABLET | Refills: 0 | Status: SHIPPED | OUTPATIENT
Start: 2023-02-23 | End: 2023-03-03

## 2023-03-03 ENCOUNTER — HOSPITAL ENCOUNTER (OUTPATIENT)
Facility: CLINIC | Age: 63
Discharge: HOME | End: 2023-03-03
Attending: SURGERY | Admitting: SURGERY
Payer: COMMERCIAL

## 2023-03-03 ENCOUNTER — ANESTHESIA (OUTPATIENT)
Dept: GASTROENTEROLOGY | Facility: CLINIC | Age: 63
End: 2023-03-03
Payer: COMMERCIAL

## 2023-03-03 VITALS
TEMPERATURE: 98 F | SYSTOLIC BLOOD PRESSURE: 127 MMHG | OXYGEN SATURATION: 100 % | BODY MASS INDEX: 28.92 KG/M2 | DIASTOLIC BLOOD PRESSURE: 81 MMHG | RESPIRATION RATE: 16 BRPM | WEIGHT: 202 LBS | HEIGHT: 70 IN | HEART RATE: 64 BPM

## 2023-03-03 DIAGNOSIS — Z12.11 SPECIAL SCREENING FOR MALIGNANT NEOPLASMS, COLON: Primary | ICD-10-CM

## 2023-03-03 LAB
COLONOSCOPY: NORMAL
UPPER GI ENDOSCOPY: NORMAL

## 2023-03-03 PROCEDURE — 45385 COLONOSCOPY W/LESION REMOVAL: CPT | Performed by: SURGERY

## 2023-03-03 PROCEDURE — 43239 EGD BIOPSY SINGLE/MULTIPLE: CPT | Performed by: SURGERY

## 2023-03-03 PROCEDURE — 250N000011 HC RX IP 250 OP 636

## 2023-03-03 PROCEDURE — 45380 COLONOSCOPY AND BIOPSY: CPT | Mod: XU | Performed by: SURGERY

## 2023-03-03 PROCEDURE — 258N000003 HC RX IP 258 OP 636: Performed by: SURGERY

## 2023-03-03 PROCEDURE — 250N000009 HC RX 250

## 2023-03-03 PROCEDURE — 88305 TISSUE EXAM BY PATHOLOGIST: CPT | Mod: TC | Performed by: SURGERY

## 2023-03-03 PROCEDURE — 370N000017 HC ANESTHESIA TECHNICAL FEE, PER MIN: Performed by: SURGERY

## 2023-03-03 PROCEDURE — 43239 EGD BIOPSY SINGLE/MULTIPLE: CPT | Mod: 51 | Performed by: SURGERY

## 2023-03-03 PROCEDURE — 88305 TISSUE EXAM BY PATHOLOGIST: CPT | Mod: 26 | Performed by: PATHOLOGY

## 2023-03-03 PROCEDURE — 45380 COLONOSCOPY AND BIOPSY: CPT | Mod: 59 | Performed by: SURGERY

## 2023-03-03 RX ORDER — PROPOFOL 10 MG/ML
INJECTION, EMULSION INTRAVENOUS PRN
Status: DISCONTINUED | OUTPATIENT
Start: 2023-03-03 | End: 2023-03-03

## 2023-03-03 RX ORDER — NALOXONE HYDROCHLORIDE 0.4 MG/ML
0.2 INJECTION, SOLUTION INTRAMUSCULAR; INTRAVENOUS; SUBCUTANEOUS
Status: DISCONTINUED | OUTPATIENT
Start: 2023-03-03 | End: 2023-03-03 | Stop reason: HOSPADM

## 2023-03-03 RX ORDER — SODIUM CHLORIDE, SODIUM LACTATE, POTASSIUM CHLORIDE, CALCIUM CHLORIDE 600; 310; 30; 20 MG/100ML; MG/100ML; MG/100ML; MG/100ML
INJECTION, SOLUTION INTRAVENOUS CONTINUOUS
Status: DISCONTINUED | OUTPATIENT
Start: 2023-03-03 | End: 2023-03-03 | Stop reason: HOSPADM

## 2023-03-03 RX ORDER — NALOXONE HYDROCHLORIDE 0.4 MG/ML
0.4 INJECTION, SOLUTION INTRAMUSCULAR; INTRAVENOUS; SUBCUTANEOUS
Status: DISCONTINUED | OUTPATIENT
Start: 2023-03-03 | End: 2023-03-03 | Stop reason: HOSPADM

## 2023-03-03 RX ORDER — LIDOCAINE 40 MG/G
CREAM TOPICAL
Status: DISCONTINUED | OUTPATIENT
Start: 2023-03-03 | End: 2023-03-03 | Stop reason: HOSPADM

## 2023-03-03 RX ORDER — ONDANSETRON 2 MG/ML
4 INJECTION INTRAMUSCULAR; INTRAVENOUS
Status: DISCONTINUED | OUTPATIENT
Start: 2023-03-03 | End: 2023-03-03 | Stop reason: HOSPADM

## 2023-03-03 RX ORDER — FLUMAZENIL 0.1 MG/ML
0.2 INJECTION, SOLUTION INTRAVENOUS
Status: DISCONTINUED | OUTPATIENT
Start: 2023-03-03 | End: 2023-03-03 | Stop reason: HOSPADM

## 2023-03-03 RX ADMIN — PROPOFOL 100 MG: 10 INJECTION, EMULSION INTRAVENOUS at 08:07

## 2023-03-03 RX ADMIN — PROPOFOL 50 MG: 10 INJECTION, EMULSION INTRAVENOUS at 08:10

## 2023-03-03 RX ADMIN — TOPICAL ANESTHETIC 2 SPRAY: 200 SPRAY DENTAL; PERIODONTAL at 08:06

## 2023-03-03 RX ADMIN — PROPOFOL 50 MG: 10 INJECTION, EMULSION INTRAVENOUS at 08:20

## 2023-03-03 RX ADMIN — PROPOFOL 100 MG: 10 INJECTION, EMULSION INTRAVENOUS at 08:14

## 2023-03-03 RX ADMIN — SODIUM CHLORIDE, POTASSIUM CHLORIDE, SODIUM LACTATE AND CALCIUM CHLORIDE: 600; 310; 30; 20 INJECTION, SOLUTION INTRAVENOUS at 08:05

## 2023-03-03 ASSESSMENT — ACTIVITIES OF DAILY LIVING (ADL): ADLS_ACUITY_SCORE: 35

## 2023-03-03 NOTE — ANESTHESIA POSTPROCEDURE EVALUATION
Patient: Alissa Crawley    Procedure: Procedure(s):  COLONOSCOPY, FLEXIBLE, WITH LESION REMOVAL USING SNARE  ESOPHAGOGASTRODUODENOSCOPY, WITH BIOPSY  COLONOSCOPY, WITH POLYPECTOMY AND BIOPSY       Anesthesia Type:  General    Note:  Disposition: Outpatient   Postop Pain Control: Uneventful            Sign Out: Well controlled pain   PONV: No   Neuro/Psych: Uneventful            Sign Out: Acceptable/Baseline neuro status   Airway/Respiratory: Uneventful            Sign Out: Acceptable/Baseline resp. status   CV/Hemodynamics: Uneventful            Sign Out: Acceptable CV status; No obvious hypovolemia; No obvious fluid overload   Other NRE:    DID A NON-ROUTINE EVENT OCCUR?            Last vitals:  Vitals:    03/03/23 0729   BP: (!) 158/90   Resp: 16   Temp: 36.8  C (98.3  F)   SpO2: 99%       Electronically Signed By: OLEGARIO Mack CRNA  March 3, 2023  8:39 AM

## 2023-03-03 NOTE — H&P
62 year old year old female here for colonoscopy and upper endoscopy for anemia. Patient last had colonoscopy 15 years ago, polyps were removed.  Patient denies any visible blood in stool or change in stool caliber.    Patient Active Problem List   Diagnosis     Chronic laryngitis     Dysthymia     Episodic cluster headache     Gastroesophageal reflux disease     Menopause     Mixed incontinence     Tibia/fibula fracture, shaft, right, sequela     Tibia/fibula fracture, shaft, right, closed, initial encounter     Tibia/fibula fracture, right, closed, initial encounter     Tibia/fibula fracture, right, closed, with delayed healing, subsequent encounter     Tibia/fibula fracture, left, closed, with malunion, subsequent encounter     Lumbar radiculopathy     Excessive daytime sleepiness     Macrocytic anemia with vitamin B12 deficiency     Vitamin B 12 deficiency       Past Medical History:   Diagnosis Date     Chronic laryngitis 11/27/2020     Dysthymia 11/27/2020     Episodic cluster headache 11/27/2020     Gastroesophageal reflux disease 11/27/2020       Past Surgical History:   Procedure Laterality Date     CHOLECYSTECTOMY       HYSTERECTOMY, PAP NO LONGER INDICATED       OPEN REDUCTION INTERNAL FIXATION FIBULA Right 11/28/2020    Procedure: OPEN REDUCTION INTERNAL FIXATION, FRACTURE, FIBULA;  Surgeon: Jose Dawkins MD;  Location: [facility];  Service: Orthopedics     [facility] TREAT TIBIAL SHAFT FX, INTRAMED IMPLANT Right 11/28/2020    Procedure: OPEN REDUCTION INTERNAL FIXATION, FRACTURE, TIBIA, USING INTRAMEDULLARY HAYLIE;  Surgeon: Jose Dawkins MD;  Location: [facility];  Service: Orthopedics       Family History   Problem Relation Age of Onset     Arthritis Mother      Heart Disease Mother      Cancer Father         prostate     Prostate Cancer Father      Anesthesia Reaction Sister         Difficulty awakening     Clotting Disorder No family hx of        Current Outpatient Rx   Medication Sig  "Dispense Refill     bisacodyl (DULCOLAX) 5 MG EC tablet Take 2 tablets at 3 pm the day before your procedure. If your procedure is before 11 am, take 2 additional tablets at 11 pm. If your procedure is after 11 am, take 2 additional tablets at 6 am. For additional instructions refer to your colonoscopy prep instructions. 4 tablet 0     polyethylene glycol (GOLYTELY) 236 g suspension The night before the exam at 6 pm drink an 8-ounce glass every 15 minutes until the jug is half empty. If you arrive before 11 AM: Drink the other half of the Golytely jug at 11 PM night before procedure. If you arrive after 11 AM: Drink the other half of the Golytely jug at 6 AM day of procedure. For additional instructions refer to your colonoscopy prep instructions. 4000 mL 0       No Known Allergies    Pt reports that she has never smoked. She has never used smokeless tobacco. She reports current alcohol use. She reports that she does not use drugs.    Exam:  BP (!) 158/90   Temp 98.3  F (36.8  C)   Resp 16   Ht 1.778 m (5' 10\")   Wt 91.6 kg (202 lb)   SpO2 99%   BMI 28.98 kg/m      Lab Results   Component Value Date    WBC 3.7 02/17/2023     Lab Results   Component Value Date    RBC 3.12 02/17/2023     Lab Results   Component Value Date    HGB 10.3 02/17/2023     Lab Results   Component Value Date    HCT 31.7 02/17/2023     Lab Results   Component Value Date     02/17/2023     Lab Results   Component Value Date    MCH 33.0 02/17/2023     Lab Results   Component Value Date    MCHC 32.5 02/17/2023     Lab Results   Component Value Date    RDW 14.2 02/17/2023     Lab Results   Component Value Date     02/17/2023       Awake, Alert OX3  Lungs - CTA bilaterally  CV - RRR, no murmurs, distal pulses intact  Abd - soft, non-distended, non-tender, +BS  Extr - No cyanosis or edema    A/P 62 year old year old female in need of colonoscopy and upper endoscopy for anemia. Risks, benefits, alternatives, and complications were " discussed including the possibility of perforation, bleeding, missed lesion and the patient agreed to proceed.    Nader Gates, DO on 3/3/2023 at 7:57 AM

## 2023-03-03 NOTE — LETTER
Alissa Crawley  [id number] [address] [place] [id number]  March 8, 2023    Dear Alissa,   This letter is to inform you of the results of your pathology report on your upper endoscopy (EGD).    Your pathology report was:  Positive for H-Pylori which is a bacteria that causes inflammation of the stomach. You will need to discuss treatment options with your primary care doctor. Treatment will depend on your symptoms. Please make an appointment with your primary physician.     Final Diagnosis   A(1). Duodenum, biopsy:  - -Small intestinal mucosa with no significant histopathologic abnormalities.  -Normal villous architecture identified and no prominence in intraepithelial lymphocytes seen.  -Negative for luminal organisms.  -Negative for dysplasia or malignancy.        B(2).  Stomach, antrum, biopsy  -Antral and oxyntic type gastric mucosa with active chronic H. Pylori gastritis.  -POSITIVE for H. Pylori organisms on routine stains.  -Negative for intestinal metaplasia.  -Negative for dysplasia or malignancy.        C(3). Colon, random locations, biopsies:  - Colonic mucosa with no specific histopathologic abnormalities.  - No features of an acute, chronic or microscopic colitis identified.  - Negative for dysplasia or malignancy.        D(4).  Colon, Descending, polyp, polypectomy:  -Tubular adenoma  -Negative for high-grade dysplasia and malignancy.        E(5).  Colon, Rectum, polyp, polypectomy:  -Hyperplastic polyp  -Negative for dysplasia or malignancy.            If you have any questions or concerns please do not hesitate to call my office at [phone].  Sincerely,   Nader Gates,    [facility]

## 2023-03-03 NOTE — ANESTHESIA CARE TRANSFER NOTE
Patient: Alissa Crawley    Procedure: Procedure(s):  COLONOSCOPY, FLEXIBLE, WITH LESION REMOVAL USING SNARE  ESOPHAGOGASTRODUODENOSCOPY, WITH BIOPSY  COLONOSCOPY, WITH POLYPECTOMY AND BIOPSY       Diagnosis: Screen for colon cancer [Z12.11]  Macrocytic anemia with vitamin B12 deficiency [D51.8]  Diagnosis Additional Information: No value filed.    Anesthesia Type:   General     Note:    Oropharynx: oropharynx clear of all foreign objects  Level of Consciousness: awake      Independent Airway: airway patency satisfactory and stable  Dentition: dentition unchanged  Vital Signs Stable: post-procedure vital signs reviewed and stable  Report to RN Given: handoff report given  Patient transferred to: Phase II    Handoff Report: Identifed the Patient, Identified the Reponsible Provider, Reviewed the pertinent medical history, Discussed the surgical course, Reviewed Intra-OP anesthesia mangement and issues during anesthesia, Set expectations for post-procedure period and Allowed opportunity for questions and acknowledgement of understanding      Vitals:  Vitals Value Taken Time   BP     Temp     Pulse     Resp     SpO2 95 % 03/03/23 0838   Vitals shown include unvalidated device data.    Electronically Signed By: OLEGARIO Mack CRNA  March 3, 2023  8:39 AM

## 2023-03-06 ENCOUNTER — ANCILLARY PROCEDURE (OUTPATIENT)
Dept: VASCULAR ULTRASOUND | Facility: CLINIC | Age: 63
End: 2023-03-06
Attending: PHYSICIAN ASSISTANT
Payer: COMMERCIAL

## 2023-03-06 DIAGNOSIS — I87.2 VENOUS INCOMPETENCE: ICD-10-CM

## 2023-03-06 PROCEDURE — 93971 EXTREMITY STUDY: CPT | Mod: RT

## 2023-03-06 PROCEDURE — 93971 EXTREMITY STUDY: CPT | Mod: 26 | Performed by: SURGERY

## 2023-03-07 LAB
PATH REPORT.COMMENTS IMP SPEC: NORMAL
PATH REPORT.COMMENTS IMP SPEC: NORMAL
PATH REPORT.FINAL DX SPEC: NORMAL
PATH REPORT.GROSS SPEC: NORMAL
PATH REPORT.MICROSCOPIC SPEC OTHER STN: NORMAL
PATH REPORT.RELEVANT HX SPEC: NORMAL
PHOTO IMAGE: NORMAL

## 2023-03-08 DIAGNOSIS — A04.8 H. PYLORI INFECTION: Primary | ICD-10-CM

## 2023-03-08 RX ORDER — OMEPRAZOLE 40 MG/1
40 CAPSULE, DELAYED RELEASE ORAL 2 TIMES DAILY
Qty: 28 CAPSULE | Refills: 0 | Status: SHIPPED | OUTPATIENT
Start: 2023-03-08 | End: 2023-03-22

## 2023-03-08 RX ORDER — AMOXICILLIN 500 MG/1
1000 CAPSULE ORAL 2 TIMES DAILY
Qty: 56 CAPSULE | Refills: 0 | Status: SHIPPED | OUTPATIENT
Start: 2023-03-08 | End: 2023-03-22

## 2023-03-08 RX ORDER — CLARITHROMYCIN 500 MG
500 TABLET ORAL 2 TIMES DAILY
Qty: 28 TABLET | Refills: 0 | Status: SHIPPED | OUTPATIENT
Start: 2023-03-08 | End: 2023-03-22

## 2023-03-16 ENCOUNTER — VIRTUAL VISIT (OUTPATIENT)
Dept: FAMILY MEDICINE | Facility: CLINIC | Age: 63
End: 2023-03-16
Payer: COMMERCIAL

## 2023-03-16 DIAGNOSIS — D51.8 MACROCYTIC ANEMIA WITH VITAMIN B12 DEFICIENCY: Primary | ICD-10-CM

## 2023-03-16 DIAGNOSIS — A04.8 H. PYLORI INFECTION: ICD-10-CM

## 2023-03-16 PROCEDURE — 99213 OFFICE O/P EST LOW 20 MIN: CPT | Mod: 95 | Performed by: NURSE PRACTITIONER

## 2023-03-16 NOTE — PROGRESS NOTES
Kacy is a 62 year old who is being evaluated via a billable telephone visit.      What phone number would you like to be contacted at? [phone]  How would you like to obtain your AVS? Roger    Distant Location (provider location):  On-site    Assessment & Plan     Macrocytic anemia with vitamin B12 deficiency  Needs recheck after H pylori treatment, did not improve on B12 replacement  - CBC with platelets; Future    H. pylori infection  Antibiotic treatment sent in 3/6 by Surgery- patient was unaware, will start treatment and schedule labs for areound 5/1/22  - Helicobacter pylori Antigen Stool; Future         FUTURE LABS:       - Schedule labs and stool collection supplies around 5/1/23    FUTURE APPOINTMENTS:       - Follow-up visit in case of new or worsening symptoms.     See Patient Instructions    No follow-ups on file.    OLEGARIO Bolaños [facility]   Kacy is a 62 year old, presenting for the following health issues:  Results      History of Present Illness       Back Pain:  She presents for follow up of back pain. Patient's back pain is a recurring problem.  Location of back pain:  Right lower back, left lower back, right side of neck and left side of neck  Description of back pain: shooting  Back pain spreads: nowhere    Since patient first noticed back pain, pain is: always present, but gets better and worse  Does back pain interfere with her job:  No      Reason for visit:  Go over test results.She consumes 2 sweetened beverage(s) daily.She exercises with enough effort to increase her heart rate 9 or less minutes per day.  She exercises with enough effort to increase her heart rate 3 or less days per week.   She is taking medications regularly.         Review of Systems   Constitutional, HEENT, cardiovascular, pulmonary, gi and gu systems are negative, except as otherwise noted.      Objective           Vitals:  No vitals were obtained today due  to virtual visit.    Physical Exam   healthy, alert and no distress  PSYCH: Alert and oriented times 3; coherent speech, normal   rate and volume, able to articulate logical thoughts, able   to abstract reason, no tangential thoughts, no hallucinations   or delusions  Her affect is normal and pleasant  RESP: No cough, no audible wheezing, able to talk in full sentences  Remainder of exam unable to be completed due to telephone visits                Phone call duration: 8 minutes

## 2023-03-21 ENCOUNTER — VIRTUAL VISIT (OUTPATIENT)
Dept: VASCULAR SURGERY | Facility: CLINIC | Age: 63
End: 2023-03-21
Payer: COMMERCIAL

## 2023-03-21 DIAGNOSIS — I87.2 VENOUS INCOMPETENCE: Primary | ICD-10-CM

## 2023-03-21 PROCEDURE — 99214 OFFICE O/P EST MOD 30 MIN: CPT | Mod: VID | Performed by: PHYSICIAN ASSISTANT

## 2023-03-21 NOTE — NURSING NOTE
Chief Complaint   Patient presents with     Follow Up       Patient confirms medications and allergies are accurate via patients echeck in completion, and or denies any changes since last reviewed/verified.     Ria Chavez, Virtual Facilitator    Is the patient currently in the state of [place]? YES    Visit mode:VIDEO    If the visit is dropped, the patient can be reconnected by: VIDEO VISIT: Text to cell phone: [phone]    Will anyone else be joining the visit? NO      How would you like to obtain your AVS? MyChart    Are changes needed to the allergy or medication list? NO    Reason for visit: 3 month Follow up post compression therapy

## 2023-03-21 NOTE — PATIENT INSTRUCTIONS
Snehal Maxwell,    Thank you for entrusting your care with us today. After your visit today with ALPESH Benitez this is the plan that was discussed at your appointment.    Continue with compression stockings as much as possible. We can send another prescription for those.     Follow up as needed.     I am including additional information on these things and our contact information if you have any questions or concerns.   Please do not hesitate to reach out to us if you felt we did not answer your questions or you are unsure of the treatment plan after your visit today. Our number is [phone].Thank you for trusting us with your care.         Again thank you for your time.

## 2023-03-21 NOTE — PROGRESS NOTES
"    VASCULAR SURGERY PROGRESS NOTE    LOCATION:  [facility]     Alissa Crawley  Medical Record #: [id number]  YOB: 1960  Age: 62 year old     Date of Service: 3/21/2023    PRIMARY CARE PROVIDER: Melly Roach    Reason for visit: Follow-up right leg pain    IMPRESSION: 62-year-old female contacted via telephone for follow-up regarding right lower extremity pain.  Recent venous competency study showed no evidence of venous insufficiency.  Has been wearing compression with no relief and actually worsening of symptoms.  Do not suspect that her lower extremity pain is vascular in nature at this time.     RECOMMENDATION/RISKS: Encouraged continued follow-up with PCP regarding right lower extremity pain.  No further vascular work-up needed at this time.  We will follow-up on an as-needed basis.    HPI:  Alissa Crawley is a 62 year old female who was referred to the vascular team for evaluation of venous disease.  Patient was seen 3 months ago and noted \"bee sting\" and shooting pains to the right lower extremity without any classic symptoms of venous insufficiency.  She had a venous competency study done at this time showing superficial venous insufficiency in the right GSV at the proximal and mid thigh.  It was discussed that her symptoms were not a classic presentation of venous disease but given ultrasound findings, recommended starting compression therapy.    Today, Mrs. Crawley is contacted via phone call for follow-up.  She notes that she has been wearing compression with no relief actually her symptoms have worsened.  Denies any new lower extremity concerns at this time.  Repeat venous competency results were discussed and all questions answered.  No other concerns.    REVIEW OF SYSTEMS:    A 12 point ROS was reviewed and is negative except for what is listed above in HPI.    PHH:    Past Medical History:   Diagnosis Date     Chronic laryngitis 11/27/2020     Dysthymia " 11/27/2020     Episodic cluster headache 11/27/2020     Gastroesophageal reflux disease 11/27/2020      Past Surgical History:   Procedure Laterality Date     CHOLECYSTECTOMY       COLONOSCOPY N/A 3/3/2023    Procedure: COLONOSCOPY, FLEXIBLE, WITH LESION REMOVAL USING SNARE;  Surgeon: Nader Gates DO;  Location: [facility]     COLONOSCOPY N/A 3/3/2023    Procedure: COLONOSCOPY, WITH POLYPECTOMY AND BIOPSY;  Surgeon: Nader Gates DO;  Location: [facility]     ESOPHAGOSCOPY, GASTROSCOPY, DUODENOSCOPY (EGD), COMBINED N/A 3/3/2023    Procedure: ESOPHAGOGASTRODUODENOSCOPY, WITH BIOPSY;  Surgeon: Nader Gates DO;  Location: [facility]     HYSTERECTOMY, PAP NO LONGER INDICATED       OPEN REDUCTION INTERNAL FIXATION FIBULA Right 11/28/2020    Procedure: OPEN REDUCTION INTERNAL FIXATION, FRACTURE, FIBULA;  Surgeon: Jose Dawkins MD;  Location: [facility];  Service: Orthopedics     [facility] TREAT TIBIAL SHAFT FX, INTRAMED IMPLANT Right 11/28/2020    Procedure: OPEN REDUCTION INTERNAL FIXATION, FRACTURE, TIBIA, USING INTRAMEDULLARY HAYLIE;  Surgeon: Jose Dawkins MD;  Location: [facility];  Service: Orthopedics     ALLERGIES:  Patient has no known allergies.    MEDS:    Current Outpatient Medications:      calcium carbonate (OS-JAZZY) 600 mg calcium (1,500 mg) tablet, [CALCIUM CARBONATE (OS-JAZZY) 600 MG CALCIUM (1,500 MG) TABLET] Take 600 mg by mouth daily., Disp: , Rfl:      cyanocobalamin 1000 MCG tablet, Take 1,000 mcg by mouth daily, Disp: , Rfl:      esomeprazole (NEXIUM) 20 MG capsule, Take 20 mg by mouth daily before breakfast, Disp: , Rfl:      gabapentin (NEURONTIN) 300 MG capsule, Take 1 capsule (300 mg) by mouth 3 times daily, Disp: 90 capsule, Rfl: 3     omeprazole (PRILOSEC) 40 MG DR capsule, Take 1 capsule (40 mg) by mouth 2 times daily for 14 days, Disp: 28 capsule, Rfl: 0     sertraline (ZOLOFT) 100 MG tablet, Take 2 tablets (200 mg) by mouth daily, Disp: 180 tablet, Rfl: 3      "verapamil ER (VERELAN) 120 MG 24 hr capsule, Take 1 capsule (120 mg) by mouth twice a week Take 120 mg by mouth 2 (two) times a week. In the PM - on Weds and Sundays, Disp: 24 capsule, Rfl: 3     amoxicillin (AMOXIL) 500 MG capsule, Take 2 capsules (1,000 mg) by mouth 2 times daily for 14 days (Patient not taking: Reported on 3/16/2023), Disp: 56 capsule, Rfl: 0     clarithromycin (BIAXIN) 500 MG tablet, Take 1 tablet (500 mg) by mouth 2 times daily for 14 days (Patient not taking: Reported on 3/16/2023), Disp: 28 tablet, Rfl: 0    SOCIAL HABITS:    History   Smoking Status     Never   Smokeless Tobacco     Never     Social History    Substance and Sexual Activity      Alcohol use: Yes        Comment: Alcoholic Drinks/day: \"A drink or two on the weekend\"      History   Drug Use Unknown     FAMILY HISTORY:    Family History   Problem Relation Age of Onset     Arthritis Mother      Heart Disease Mother      Cancer Father         prostate     Prostate Cancer Father      Anesthesia Reaction Sister         Difficulty awakening     Clotting Disorder No family hx of      PE:  There were no vitals taken for this visit.  Wt Readings from Last 1 Encounters:   03/03/23 91.6 kg (202 lb)     There is no height or weight on file to calculate BMI.    EXAM:  No physical exam done - telephone visit.    DIAGNOSTIC STUDIES:     Images:    US Venous Competency Right    Result Date: 3/6/2023  Indication:  Surveillance Right leg Varicose Veins, Pain, and Swelling    Impression:   Right Deep Vein Findings: Patent deep venous system with no evidence of reflux   Right Superficial Vein Findings:   Right Greater saphenous vein: Patent Greater Saphenous Vein without evidence of reflux.   Right Small Saphenous Vein: Patent Small Saphenous Vein without evidence of reflux.   Perforating and Accessory Veins: No reflux noted     LABS:      Sodium   Date Value Ref Range Status   08/18/2022 138 133 - 144 mmol/L Final   11/27/2020 139 136 - 145 mmol/L " Final     Urea Nitrogen   Date Value Ref Range Status   08/18/2022 20 7 - 30 mg/dL Final   11/27/2020 17 8 - 22 mg/dL Final     Hemoglobin   Date Value Ref Range Status   02/17/2023 10.3 (L) 11.7 - 15.7 g/dL Final   11/01/2022 11.3 (L) 11.7 - 15.7 g/dL Final   08/18/2022 11.7 11.7 - 15.7 g/dL Final     Platelet Count   Date Value Ref Range Status   02/17/2023 235 150 - 450 10e3/uL Final   11/01/2022 259 150 - 450 10e3/uL Final   08/18/2022 258 150 - 450 10e3/uL Final     INR   Date Value Ref Range Status   11/27/2020 1.04 0.90 - 1.10 Final     20 minutes spent on the day of encounter doing chart review, history and exam, documentation, and further activities as noted.     5 minutes spent on the phone with patient     ALPESH Simmons-C  VASCULAR SURGERY

## 2023-03-23 ENCOUNTER — MYC MEDICAL ADVICE (OUTPATIENT)
Dept: FAMILY MEDICINE | Facility: CLINIC | Age: 63
End: 2023-03-23
Payer: COMMERCIAL

## 2023-03-23 DIAGNOSIS — M79.604 PAIN OF RIGHT LOWER EXTREMITY: Primary | ICD-10-CM

## 2023-03-30 ENCOUNTER — LAB (OUTPATIENT)
Dept: INFUSION THERAPY | Facility: HOSPITAL | Age: 63
End: 2023-03-30
Attending: INTERNAL MEDICINE
Payer: COMMERCIAL

## 2023-03-30 ENCOUNTER — ONCOLOGY VISIT (OUTPATIENT)
Dept: ONCOLOGY | Facility: HOSPITAL | Age: 63
End: 2023-03-30
Attending: NURSE PRACTITIONER
Payer: COMMERCIAL

## 2023-03-30 VITALS
HEIGHT: 69 IN | TEMPERATURE: 98.7 F | OXYGEN SATURATION: 98 % | WEIGHT: 192 LBS | SYSTOLIC BLOOD PRESSURE: 137 MMHG | BODY MASS INDEX: 28.44 KG/M2 | HEART RATE: 74 BPM | DIASTOLIC BLOOD PRESSURE: 78 MMHG | RESPIRATION RATE: 20 BRPM

## 2023-03-30 DIAGNOSIS — D51.8 MACROCYTIC ANEMIA WITH VITAMIN B12 DEFICIENCY: Primary | ICD-10-CM

## 2023-03-30 DIAGNOSIS — D51.8 MACROCYTIC ANEMIA WITH VITAMIN B12 DEFICIENCY: ICD-10-CM

## 2023-03-30 LAB
ALBUMIN SERPL BCG-MCNC: 4.6 G/DL (ref 3.5–5.2)
ALP SERPL-CCNC: 78 U/L (ref 35–104)
ALT SERPL W P-5'-P-CCNC: 11 U/L (ref 10–35)
ANION GAP SERPL CALCULATED.3IONS-SCNC: 10 MMOL/L (ref 7–15)
AST SERPL W P-5'-P-CCNC: 17 U/L (ref 10–35)
BASOPHILS # BLD AUTO: 0 10E3/UL (ref 0–0.2)
BASOPHILS NFR BLD AUTO: 0 %
BILIRUB SERPL-MCNC: 0.4 MG/DL
BUN SERPL-MCNC: 20.3 MG/DL (ref 8–23)
CALCIUM SERPL-MCNC: 9.4 MG/DL (ref 8.8–10.2)
CHLORIDE SERPL-SCNC: 102 MMOL/L (ref 98–107)
CREAT SERPL-MCNC: 1.07 MG/DL (ref 0.51–0.95)
DEPRECATED HCO3 PLAS-SCNC: 26 MMOL/L (ref 22–29)
EOSINOPHIL # BLD AUTO: 0 10E3/UL (ref 0–0.7)
EOSINOPHIL NFR BLD AUTO: 1 %
ERYTHROCYTE [DISTWIDTH] IN BLOOD BY AUTOMATED COUNT: 13.4 % (ref 10–15)
FOLATE SERPL-MCNC: 13.1 NG/ML (ref 4.6–34.8)
GFR SERPL CREATININE-BSD FRML MDRD: 59 ML/MIN/1.73M2
GLUCOSE SERPL-MCNC: 109 MG/DL (ref 70–99)
HCT VFR BLD AUTO: 35.5 % (ref 35–47)
HGB BLD-MCNC: 11.8 G/DL (ref 11.7–15.7)
IMM GRANULOCYTES # BLD: 0 10E3/UL
IMM GRANULOCYTES NFR BLD: 0 %
LYMPHOCYTES # BLD AUTO: 1.5 10E3/UL (ref 0.8–5.3)
LYMPHOCYTES NFR BLD AUTO: 31 %
MCH RBC QN AUTO: 33.8 PG (ref 26.5–33)
MCHC RBC AUTO-ENTMCNC: 33.2 G/DL (ref 31.5–36.5)
MCV RBC AUTO: 102 FL (ref 78–100)
MONOCYTES # BLD AUTO: 0.4 10E3/UL (ref 0–1.3)
MONOCYTES NFR BLD AUTO: 9 %
NEUTROPHILS # BLD AUTO: 2.9 10E3/UL (ref 1.6–8.3)
NEUTROPHILS NFR BLD AUTO: 59 %
NRBC # BLD AUTO: 0 10E3/UL
NRBC BLD AUTO-RTO: 0 /100
PLATELET # BLD AUTO: 254 10E3/UL (ref 150–450)
POTASSIUM SERPL-SCNC: 4.8 MMOL/L (ref 3.4–5.3)
PROT SERPL-MCNC: 7.8 G/DL (ref 6.4–8.3)
RBC # BLD AUTO: 3.49 10E6/UL (ref 3.8–5.2)
RETICS # AUTO: 0.06 10E6/UL (ref 0.01–0.11)
RETICS/RBC NFR AUTO: 1.7 % (ref 0.8–2.7)
SODIUM SERPL-SCNC: 138 MMOL/L (ref 136–145)
WBC # BLD AUTO: 4.8 10E3/UL (ref 4–11)

## 2023-03-30 PROCEDURE — 86334 IMMUNOFIX E-PHORESIS SERUM: CPT | Performed by: PATHOLOGY

## 2023-03-30 PROCEDURE — 85025 COMPLETE CBC W/AUTO DIFF WBC: CPT

## 2023-03-30 PROCEDURE — 99207 BLOOD MORPHOLOGY PATHOLOGIST REVIEW: CPT | Performed by: PATHOLOGY

## 2023-03-30 PROCEDURE — 99212 OFFICE O/P EST SF 10 MIN: CPT | Performed by: INTERNAL MEDICINE

## 2023-03-30 PROCEDURE — 36415 COLL VENOUS BLD VENIPUNCTURE: CPT

## 2023-03-30 PROCEDURE — 82746 ASSAY OF FOLIC ACID SERUM: CPT

## 2023-03-30 PROCEDURE — 82668 ASSAY OF ERYTHROPOIETIN: CPT

## 2023-03-30 PROCEDURE — 99204 OFFICE O/P NEW MOD 45 MIN: CPT | Performed by: INTERNAL MEDICINE

## 2023-03-30 PROCEDURE — 85045 AUTOMATED RETICULOCYTE COUNT: CPT

## 2023-03-30 PROCEDURE — 80053 COMPREHEN METABOLIC PANEL: CPT

## 2023-03-30 PROCEDURE — 36415 COLL VENOUS BLD VENIPUNCTURE: CPT | Mod: TC

## 2023-03-30 PROCEDURE — 86334 IMMUNOFIX E-PHORESIS SERUM: CPT | Mod: 26

## 2023-03-30 RX ORDER — CLARITHROMYCIN 500 MG
500 TABLET ORAL 2 TIMES DAILY
COMMUNITY
End: 2023-04-21

## 2023-03-30 ASSESSMENT — PAIN SCALES - GENERAL: PAINLEVEL: MILD PAIN (3)

## 2023-03-30 NOTE — PROGRESS NOTES
[facility] Hematology and Oncology Consult Note    Patient: Alissa Crawley  MRN: [id number]  Date of Service: Mar 30, 2023       Reason for Visit    I was consulted by Melly Roach for anemia    Assessment/Plan    Macrocytic anemia  H. pylori  Symptoms of fatigue, hair thinning and sleepiness  Back pain      Patient has developed mild macrocytic anemia over the last 7 months.  Remote history of iron deficiency anemia related to menorrhagia.  Labs show normal B12, folate and thyroid function tests.    Iron studies show low normal iron saturation and ferritin levels.  Typically this should cause a microcytic anemia.  H. pylori and use of PPI could cause iron malabsorption.  Need to rule out any hemolysis or other etiology for anemia with additional lab tests today.    Recommend trial of oral iron, ferrous sulfate 325 mg p.o. daily 1 to 2 tablets daily.    If blood work shows no explanation and there is no response to oral iron then would consider bone marrow aspirate and biopsy for further evaluation.    Questions answered.    Plan: Check labs today  Start oral iron ferrous sulfate 325 mg p.o. daily 1-2 times daily  Follow-up in a couple of months with recheck of CBC                    ECOG Performance    0 - Independent    Encounter Diagnoses:    Problem List Items Addressed This Visit        Digestive    Macrocytic anemia with vitamin B12 deficiency - Primary    Relevant Orders    Reticulocyte count    CBC with Platelets & Differential    Folate    Comprehensive metabolic panel    Erythropoietin    Protein Immunofixation Serum    Check Out Appointment Request           ______________________________________________________________________________    Staging History  Cancer Staging   No matching staging information was found for the patient.      History    Ms. lAissa Crawley is a 62 year old with past history of hypertension and depression who is referred for evaluation of anemia.  Patient with  recent symptoms of increased fatigue, hair thinning, daytime sleepiness and back pain.  Has had extensive lab work-up showing progressive mild macrocytic anemia and so was referred for further evaluation.  Did have recent endoscopy and colonoscopy with findings of H. pylori and currently is on antibiotics to treat the H. pylori.    Has had some issues with lower back pain going into the left buttock and also right leg pain from the knee below.  Appetite has been decreased very recently due to the antibiotic therapy for H. pylori but otherwise she tends to be in overeater.  25 pound weight loss over the last 6 months which is attributed to stress and having to take care of her grandkids.  Other symptoms as above.    Remote history of low iron 25 to 30 years ago treated with iron pills and a blood transfusion but improved after hysterectomy from 6 g to 13 g.  Diagnosed with B12 deficiency about 5 years ago.  Treated with pills with good response.  Recently had a 3-month trial of parenteral B12 without improvement in any of her symptoms.    Denies any bleeding symptoms.  No blood in the urine.  No change in diet.  No abdominal surgery in the stomach, intestines or colon.  Has had cholecystectomy and hysterectomy and right knee surgery in the past.    No other hospitalizations.  She lives in [place] and is  with 2 kids.  Works as a .  Does not smoke and drinks only occasionally.    No personal history of cancer.  No family history of anemia.  Father had prostate cancer in his 60s.  No other cancer in the family.                Review of systems.  No fever or night sweats.  No loss of weight.  No lumps or bumps anywhere.  No unusual headaches or eyesight issues.  No dizziness.  No bleeding from the nose.  No sores in the mouth. No problems with swallowing.  No chest pain. No shortness of breath. No cough.  No abdominal pain. No nausea or vomiting.  No diarrhea or constipation.  No blood in stool  or black colored stools.  No problems passing urine.  No numbness or tingling in hands or feet.  No skin rashes.  A 14 point review of systems is otherwise negative.        Past History    Past Medical History:   Diagnosis Date     Chronic laryngitis 11/27/2020     Dysthymia 11/27/2020     Episodic cluster headache 11/27/2020     Gastroesophageal reflux disease 11/27/2020     Past Surgical History:   Procedure Laterality Date     CHOLECYSTECTOMY       COLONOSCOPY N/A 3/3/2023    Procedure: COLONOSCOPY, FLEXIBLE, WITH LESION REMOVAL USING SNARE;  Surgeon: Nader Gates DO;  Location: [facility]     COLONOSCOPY N/A 3/3/2023    Procedure: COLONOSCOPY, WITH POLYPECTOMY AND BIOPSY;  Surgeon: Nader Gates DO;  Location: [facility]     ESOPHAGOSCOPY, GASTROSCOPY, DUODENOSCOPY (EGD), COMBINED N/A 3/3/2023    Procedure: ESOPHAGOGASTRODUODENOSCOPY, WITH BIOPSY;  Surgeon: Nader Gates DO;  Location: [facility]     HYSTERECTOMY, PAP NO LONGER INDICATED       OPEN REDUCTION INTERNAL FIXATION FIBULA Right 11/28/2020    Procedure: OPEN REDUCTION INTERNAL FIXATION, FRACTURE, FIBULA;  Surgeon: Jose Dawkins MD;  Location: [facility] OR;  Service: Orthopedics     [facility] TREAT TIBIAL SHAFT FX, INTRAMED IMPLANT Right 11/28/2020    Procedure: OPEN REDUCTION INTERNAL FIXATION, FRACTURE, TIBIA, USING INTRAMEDULLARY HAYLIE;  Surgeon: Jose Dawkins MD;  Location: [facility] OR;  Service: Orthopedics     Family History   Problem Relation Age of Onset     Arthritis Mother      Heart Disease Mother      Cancer Father         prostate     Prostate Cancer Father      Anesthesia Reaction Sister         Difficulty awakening     Clotting Disorder No family hx of      Social History     Socioeconomic History     Marital status:      Spouse name: None     Number of children: None     Years of education: None     Highest education level: None   Tobacco Use     Smoking status: Never     Smokeless tobacco: Never  "  Vaping Use     Vaping Use: Never used   Substance and Sexual Activity     Alcohol use: Yes     Comment: Alcoholic Drinks/day: \"A drink or two on the weekend\"     Drug use: Never     Sexual activity: Yes     Partners: Male         Allergies    No Known Allergies        Physical Exam    /78 (BP Location: Right arm, Patient Position: Sitting, Cuff Size: Adult Regular)   Pulse 74   Temp 98.7  F (37.1  C) (Oral)   Resp 20   Ht 1.74 m (5' 8.5\")   Wt 87.1 kg (192 lb)   SpO2 98%   BMI 28.77 kg/m        GENERAL: Alert and oriented to time place and person. Seated comfortably. In no distress.    HEAD: Atraumatic and normocephalic.    EYES: RYANN, EOMI.  No pallor.  No icterus.    Oral cavity: no mucosal lesion or tonsillar enlargement.    NECK: supple. JVP normal.  No thyroid enlargement.    LYMPH NODES: No palpable, cervical, axillary or inguinal lymphadenopathy.    CHEST: clear to auscultation bilaterally.  Resonant to percussion throughout bilaterally.  Symmetrical breath movements bilaterally.    CVS: S1 and S2 are heard. Regular rate and rhythm.  No murmur or gallop or rub heard.  No peripheral edema.    ABDOMEN: Soft. Not tender. Not distended.  No palpable hepatomegaly or splenomegaly.  No other mass palpable.  Bowel sounds heard.    EXTREMITIES: Warm.    SKIN: no rash, or bruising or purpura.  Has a full head of hair.    Lab Results    CBC 7 months ago [place] 4.6 hemoglobin 11.7 MCV 99 platelets 258  Recent CBC white count 3.7 hemoglobin 10.3 platelets 235   Normal B12 and folate and thyroid tests.  Normal CRP and sed rate.  Iron saturation 15% and ferritin of 14    Imaging Results    US Venous Competency Right    Result Date: 3/6/2023  Table formatting from the original result was not included. RIGHT Venous Insufficiency Ultrasound (Date: 03/06/23) RIGHT Lower Extremity Indication:  Surveillance Right leg Varicose Veins, Pain, and Swelling. Previous: 11/23/2022 [place] Patient History: " Patient History: Swelling Presenting Symptoms:  Swelling, Varicose Veins and Pain Technique: Supine and Upright Ultrasound of the Deep and Superficial Veins with Valsalva and Compression Augmentation Maneuvers. Duplex Imaging is performed utilizing gray-scale, Two-dimensional images, color-flow imaging, Doppler waveform analysis, and Spectral doppler imaging done with provacative maneuvers. Incompetency Criteria: Deep vein reflux reported when greater than 1000 msec flow reversal. Superficial vein reflux reported when equal to or greater than 600 msec flow reversal.  vein reflux reported as greater than 350 msec flow reversal. Right Leg Deep System Location CFV SFJ PFV PROX SFV PROX SFV MID SFV DIST POP V. PERON. V. PTV'S Compressibility (FC,PC,NC) FC FC FC FC FC FC FC FC FC Reflux -  - - - - -  - Right Leg Superficial System  Location SFJ PROX THIGH KNEE MID CALF SPJ PROX CALF MID CALF GSV (mm) 7.1 4.2 4.0 2.1    Reflux - - - -    SSV (mm)     3.0 3.4 2.7 Reflux     - - - Location SFJ AASV (mm) 2.9 Reflux  - Comments: No incompetent  veins visualized. Impression: Right Deep Vein Findings: Patent deep venous system with no evidence of reflux Right Superficial Vein Findings: Right Greater saphenous vein:: Patent Greater Saphenous Vein without evidence of reflux. Right Small Saphenous Vein:: Patent Small Saphenous Vein without evidence of reflux. Perforating and Accessory Veins: No reflux noted Reference: Compressibility: FC= Fully compressible, PC= Partially compressible, NC= Non-compressible, NV= Not Visualized Reflux: (+) Incompetent  (-) Competent, (NV) = Not Visualized Interpretation criteria:  Duration of Retrograde flow (milliseconds) Category Deep Veins Superficial Veins  veins Competent < 1000ms < 500ms < 350ms Incompetent > 1000ms > 500ms > 350ms         Signed by: Jeremy Ribeiro MD

## 2023-03-30 NOTE — LETTER
"    3/30/2023         RE: Alissa Crawley  [address] [address] [place] [address]        Dear Colleague,    Thank you for referring your patient, Alissa Crawley, to the [facility] [place]. Please see a copy of my visit note below.    Oncology Rooming Note    March 30, 2023 10:50 AM   Alissa Crawley is a 62 year old female who presents for:    Chief Complaint   Patient presents with     Hematology     New Patient - Macrocytic anemia with vitamin B12 deficiency     Initial Vitals: /78 (BP Location: Right arm, Patient Position: Sitting, Cuff Size: Adult Regular)   Pulse 74   Temp 98.7  F (37.1  C) (Oral)   Resp 20   Ht 1.74 m (5' 8.5\")   Wt 87.1 kg (192 lb)   SpO2 98%   BMI 28.77 kg/m   Estimated body mass index is 28.77 kg/m  as calculated from the following:    Height as of this encounter: 1.74 m (5' 8.5\").    Weight as of this encounter: 87.1 kg (192 lb). Body surface area is 2.05 meters squared.  Mild Pain (3) Comment: Data Unavailable   No LMP recorded. Patient has had a hysterectomy.  Allergies reviewed: Yes  Medications reviewed: Yes    Medications: Medication refills not needed today.  Pharmacy name entered into [facility]Path: [facility] - [facility] [id number] [facility]    Clinical concerns: New Patient - Macrocytic anemia with vitamin B12 deficiency.      GILMA Cardona    [facility] Hematology and Oncology Consult Note    Patient: Alissa Crawley  MRN: [id number]  Date of Service: Mar 30, 2023       Reason for Visit    I was consulted by Melly Roach for anemia    Assessment/Plan    Macrocytic anemia  H. pylori  Symptoms of fatigue, hair thinning and sleepiness  Back pain      Patient has developed mild macrocytic anemia over the last 7 months.  Remote history of iron deficiency anemia related to menorrhagia.  Labs show normal B12, folate and thyroid function tests.    Iron studies show low normal iron saturation and " ferritin levels.  Typically this should cause a microcytic anemia.  H. pylori and use of PPI could cause iron malabsorption.  Need to rule out any hemolysis or other etiology for anemia with additional lab tests today.    Recommend trial of oral iron, ferrous sulfate 325 mg p.o. daily 1 to 2 tablets daily.    If blood work shows no explanation and there is no response to oral iron then would consider bone marrow aspirate and biopsy for further evaluation.    Questions answered.    Plan: Check labs today  Start oral iron ferrous sulfate 325 mg p.o. daily 1-2 times daily  Follow-up in a couple of months with recheck of CBC                    ECOG Performance    0 - Independent    Encounter Diagnoses:    Problem List Items Addressed This Visit        Digestive    Macrocytic anemia with vitamin B12 deficiency - Primary    Relevant Orders    Reticulocyte count    CBC with Platelets & Differential    Folate    Comprehensive metabolic panel    Erythropoietin    Protein Immunofixation Serum    Check Out Appointment Request           ______________________________________________________________________________    Staging History  Cancer Staging   No matching staging information was found for the patient.      History    Ms. Alissa Crawley is a 62 year old with past history of hypertension and depression who is referred for evaluation of anemia.  Patient with recent symptoms of increased fatigue, hair thinning, daytime sleepiness and back pain.  Has had extensive lab work-up showing progressive mild macrocytic anemia and so was referred for further evaluation.  Did have recent endoscopy and colonoscopy with findings of H. pylori and currently is on antibiotics to treat the H. pylori.    Has had some issues with lower back pain going into the left buttock and also right leg pain from the knee below.  Appetite has been decreased very recently due to the antibiotic therapy for H. pylori but otherwise she tends to be in  overeater.  25 pound weight loss over the last 6 months which is attributed to stress and having to take care of her grandkids.  Other symptoms as above.    Remote history of low iron 25 to 30 years ago treated with iron pills and a blood transfusion but improved after hysterectomy from 6 g to 13 g.  Diagnosed with B12 deficiency about 5 years ago.  Treated with pills with good response.  Recently had a 3-month trial of parenteral B12 without improvement in any of her symptoms.    Denies any bleeding symptoms.  No blood in the urine.  No change in diet.  No abdominal surgery in the stomach, intestines or colon.  Has had cholecystectomy and hysterectomy and right knee surgery in the past.    No other hospitalizations.  She lives in [place] and is  with 2 kids.  Works as a .  Does not smoke and drinks only occasionally.    No personal history of cancer.  No family history of anemia.  Father had prostate cancer in his 60s.  No other cancer in the family.                Review of systems.  No fever or night sweats.  No loss of weight.  No lumps or bumps anywhere.  No unusual headaches or eyesight issues.  No dizziness.  No bleeding from the nose.  No sores in the mouth. No problems with swallowing.  No chest pain. No shortness of breath. No cough.  No abdominal pain. No nausea or vomiting.  No diarrhea or constipation.  No blood in stool or black colored stools.  No problems passing urine.  No numbness or tingling in hands or feet.  No skin rashes.  A 14 point review of systems is otherwise negative.        Past History    Past Medical History:   Diagnosis Date     Chronic laryngitis 11/27/2020     Dysthymia 11/27/2020     Episodic cluster headache 11/27/2020     Gastroesophageal reflux disease 11/27/2020     Past Surgical History:   Procedure Laterality Date     CHOLECYSTECTOMY       COLONOSCOPY N/A 3/3/2023    Procedure: COLONOSCOPY, FLEXIBLE, WITH LESION REMOVAL USING SNARE;  Surgeon:  "Nader Gates DO;  Location: [facility]     COLONOSCOPY N/A 3/3/2023    Procedure: COLONOSCOPY, WITH POLYPECTOMY AND BIOPSY;  Surgeon: Nader Gates DO;  Location: [facility]     ESOPHAGOSCOPY, GASTROSCOPY, DUODENOSCOPY (EGD), COMBINED N/A 3/3/2023    Procedure: ESOPHAGOGASTRODUODENOSCOPY, WITH BIOPSY;  Surgeon: Nader Gates DO;  Location: [facility]     HYSTERECTOMY, PAP NO LONGER INDICATED       OPEN REDUCTION INTERNAL FIXATION FIBULA Right 11/28/2020    Procedure: OPEN REDUCTION INTERNAL FIXATION, FRACTURE, FIBULA;  Surgeon: Jose Dawkins MD;  Location: [facility] OR;  Service: Orthopedics     [facility] TIB[facility] SHAFT FX, INTRAMED IMPLANT Right 11/28/2020    Procedure: OPEN REDUCTION INTERNAL FIXATION, FRACTURE, TIBIA, USING INTRAMEDULLARY HAYLIE;  Surgeon: Jose Dawkins MD;  Location: [facility];  Service: Orthopedics     Family History   Problem Relation Age of Onset     Arthritis Mother      Heart Disease Mother      Cancer Father         prostate     Prostate Cancer Father      Anesthesia Reaction Sister         Difficulty awakening     Clotting Disorder No family hx of      Social History     Socioeconomic History     Marital status:      Spouse name: None     Number of children: None     Years of education: None     Highest education level: None   Tobacco Use     Smoking status: Never     Smokeless tobacco: Never   Vaping Use     Vaping Use: Never used   Substance and Sexual Activity     Alcohol use: Yes     Comment: Alcoholic Drinks/day: \"A drink or two on the weekend\"     Drug use: Never     Sexual activity: Yes     Partners: Male         Allergies    No Known Allergies        Physical Exam    /78 (BP Location: Right arm, Patient Position: Sitting, Cuff Size: Adult Regular)   Pulse 74   Temp 98.7  F (37.1  C) (Oral)   Resp 20   Ht 1.74 m (5' 8.5\")   Wt 87.1 kg (192 lb)   SpO2 98%   BMI 28.77 kg/m        GENERAL: Alert and oriented to time place and " person. Seated comfortably. In no distress.    HEAD: Atraumatic and normocephalic.    EYES: RYANN, EOMI.  No pallor.  No icterus.    Oral cavity: no mucosal lesion or tonsillar enlargement.    NECK: supple. JVP normal.  No thyroid enlargement.    LYMPH NODES: No palpable, cervical, axillary or inguinal lymphadenopathy.    CHEST: clear to auscultation bilaterally.  Resonant to percussion throughout bilaterally.  Symmetrical breath movements bilaterally.    CVS: S1 and S2 are heard. Regular rate and rhythm.  No murmur or gallop or rub heard.  No peripheral edema.    ABDOMEN: Soft. Not tender. Not distended.  No palpable hepatomegaly or splenomegaly.  No other mass palpable.  Bowel sounds heard.    EXTREMITIES: Warm.    SKIN: no rash, or bruising or purpura.  Has a full head of hair.    Lab Results    CBC 7 months ago [place] 4.6 hemoglobin 11.7 MCV 99 platelets 258  Recent CBC white count 3.7 hemoglobin 10.3 platelets 235   Normal B12 and folate and thyroid tests.  Normal CRP and sed rate.  Iron saturation 15% and ferritin of 14    Imaging Results    US Venous Competency Right    Result Date: 3/6/2023  Table formatting from the original result was not included. RIGHT Venous Insufficiency Ultrasound (Date: 03/06/23) RIGHT Lower Extremity Indication:  Surveillance Right leg Varicose Veins, Pain, and Swelling. Previous: 11/23/2022 [place] Patient History: Patient History: Swelling Presenting Symptoms:  Swelling, Varicose Veins and Pain Technique: Supine and Upright Ultrasound of the Deep and Superficial Veins with Valsalva and Compression Augmentation Maneuvers. Duplex Imaging is performed utilizing gray-scale, Two-dimensional images, color-flow imaging, Doppler waveform analysis, and Spectral doppler imaging done with provacative maneuvers. Incompetency Criteria: Deep vein reflux reported when greater than 1000 msec flow reversal. Superficial vein reflux reported when equal to or greater than 600 msec flow  reversal.  vein reflux reported as greater than 350 msec flow reversal. Right Leg Deep System Location CFV SFJ PFV PROX SFV PROX SFV MID SFV DIST POP V. PERON. V. PTV'S Compressibility (FC,PC,NC) FC FC FC FC FC FC FC FC FC Reflux -  - - - - -  - Right Leg Superficial System  Location SFJ PROX THIGH KNEE MID CALF SPJ PROX CALF MID CALF GSV (mm) 7.1 4.2 4.0 2.1    Reflux - - - -    SSV (mm)     3.0 3.4 2.7 Reflux     - - - Location SFJ AASV (mm) 2.9 Reflux  - Comments: No incompetent  veins visualized. Impression: Right Deep Vein Findings: Patent deep venous system with no evidence of reflux Right Superficial Vein Findings: Right Greater saphenous vein:: Patent Greater Saphenous Vein without evidence of reflux. Right Small Saphenous Vein:: Patent Small Saphenous Vein without evidence of reflux. Perforating and Accessory Veins: No reflux noted Reference: Compressibility: FC= Fully compressible, PC= Partially compressible, NC= Non-compressible, NV= Not Visualized Reflux: (+) Incompetent  (-) Competent, (NV) = Not Visualized Interpretation criteria:  Duration of Retrograde flow (milliseconds) Category Deep Veins Superficial Veins  veins Competent < 1000ms < 500ms < 350ms Incompetent > 1000ms > 500ms > 350ms         Signed by: Jeremy Ribeiro MD      Again, thank you for allowing me to participate in the care of your patient.        Sincerely,        Jeremy Ribeiro MD

## 2023-03-30 NOTE — PROGRESS NOTES
"Oncology Rooming Note    March 30, 2023 10:50 AM   Alissa Crawley is a 62 year old female who presents for:    Chief Complaint   Patient presents with     Hematology     New Patient - Macrocytic anemia with vitamin B12 deficiency     Initial Vitals: /78 (BP Location: Right arm, Patient Position: Sitting, Cuff Size: Adult Regular)   Pulse 74   Temp 98.7  F (37.1  C) (Oral)   Resp 20   Ht 1.74 m (5' 8.5\")   Wt 87.1 kg (192 lb)   SpO2 98%   BMI 28.77 kg/m   Estimated body mass index is 28.77 kg/m  as calculated from the following:    Height as of this encounter: 1.74 m (5' 8.5\").    Weight as of this encounter: 87.1 kg (192 lb). Body surface area is 2.05 meters squared.  Mild Pain (3) Comment: Data Unavailable   No LMP recorded. Patient has had a hysterectomy.  Allergies reviewed: Yes  Medications reviewed: Yes    Medications: Medication refills not needed today.  Pharmacy name entered into [facility]: [facility] - [facility] [address] [facility]    Clinical concerns: New Patient - Macrocytic anemia with vitamin B12 deficiency.      Yessy Jang, CMA          '  "

## 2023-03-31 LAB
EPO SERPL-ACNC: 34 MU/ML
PATH REPORT.COMMENTS IMP SPEC: NORMAL
PATH REPORT.COMMENTS IMP SPEC: NORMAL
PATH REPORT.FINAL DX SPEC: NORMAL
PATH REPORT.MICROSCOPIC SPEC OTHER STN: NORMAL
PATH REPORT.RELEVANT HX SPEC: NORMAL
PROT PATTERN SERPL IFE-IMP: NORMAL

## 2023-04-04 ENCOUNTER — TELEPHONE (OUTPATIENT)
Dept: FAMILY MEDICINE | Facility: CLINIC | Age: 63
End: 2023-04-04
Payer: COMMERCIAL

## 2023-04-04 NOTE — LETTER
April 4, 2023      Alissa Moranon  [address] [address] [place] [address]    April 4, 2023    To  Alissa Crawley  [address] [address] [place] [address]    Your team at [facility] cares about your health. We have reviewed your chart and based on our findings; we are making the following recommendations to better manage your health.     You are in particular need of attention regarding the following:     Schedule Annual MAMMOGRAPHY. [facility] scheduling number is [phone] or schedule in MyChart (self referral).  1 in 8 women will develop invasive breast cancer during her lifetime and it is the most common non-skin cancer in [place] Women. EARLY detection, new treatments, and a better understanding of the disease have increased survival rates- the 5 year survival rate in the 1960's was 63% and today it is close to 90%.  PREVENTATIVE VISIT: Physical    If you have already completed these items, please contact the clinic via phone or   [facility] so your care team can review and update your records. Thank you for   choosing [facility] for your healthcare needs. For any questions,   concerns, or to schedule an appointment please contact our clinic.    Healthy Regards,      Your [facility] Care Team

## 2023-04-04 NOTE — TELEPHONE ENCOUNTER
Patient Quality Outreach    Patient is due for the following:   Breast Cancer Screening - Mammogram  Physical Preventive Adult Physical    Next Steps:   Schedule a Adult Preventative    Type of outreach:    Sent letter.    Next Steps:  Reach out within 90 days via Letter.    Max number of attempts reached: No. Will try again in 90 days if patient still on fail list.    Questions for provider review:    None     Melissa Bell, [facility]

## 2023-04-07 ENCOUNTER — OFFICE VISIT (OUTPATIENT)
Dept: NEUROLOGY | Facility: CLINIC | Age: 63
End: 2023-04-07
Attending: PSYCHIATRY & NEUROLOGY
Payer: COMMERCIAL

## 2023-04-07 DIAGNOSIS — R52 PAINFUL PARESTHESIA: ICD-10-CM

## 2023-04-07 DIAGNOSIS — R20.2 PAINFUL PARESTHESIA: ICD-10-CM

## 2023-04-07 PROCEDURE — 95886 MUSC TEST DONE W/N TEST COMP: CPT | Mod: LT | Performed by: PSYCHIATRY & NEUROLOGY

## 2023-04-07 PROCEDURE — 95910 NRV CNDJ TEST 7-8 STUDIES: CPT | Performed by: PSYCHIATRY & NEUROLOGY

## 2023-04-07 NOTE — LETTER
4/7/2023         RE: Alissa Crawley  [address] [address] [place] [address]        Dear Colleague,    Thank you for referring your patient, Alissa Crawley, to the [facility] [place]. Please see a copy of my visit note below.    See procedure note.       Again, thank you for allowing me to participate in the care of your patient.        Sincerely,        Eduardo Michael MD

## 2023-04-07 NOTE — PROCEDURES
[facility]    Formerly [facility]  [address] [facility] [facility] [facility], [address]  [place] [address]  Tel: [phone]  Fax: [phone]          Full Name: Kacy Crawley Gender: Female  Patient ID: [id number] YOB: 1960      Visit Date: 4/7/2023 08:23  Age: 62 Years 3 Months Old  Interpreted By: Eduardo Michael MD   Ref Dr.: Linda George MD  Tech: ST   Height: 5 feet 10 inch  Reason for referral: Evaluate bilateral lowers. c/o numbness, pain in gluteus muscles down legs/feet for almost a year. h/o right leg/ankle fracture, has hardware.      Motor NCS      Nerve / Sites Lat Amp Dist Georgi    ms mV cm m/s   R Peroneal - EDB      Ankle 4.74 2.2 8       Fib head 12.50 1.8 32 41      Pop fossa 14.84 1.7 11 47   L Peroneal - EDB      Ankle 4.74 2.8 8       Fib head 12.34 2.4 33 43      Pop fossa 14.90 2.3 11 43   R Tibial - AH      Ankle 4.90 1.5 8       Pop fossa 14.69 1.2 40 41   L Tibial - AH      Ankle 5.99 6.7 8       Pop fossa 14.27 6.3 38 46       F  Wave      Nerve Fmin    ms   R Tibial - AH 67.14   L Tibial - AH 61.67       Sensory NCS      Nerve / Sites Onset Lat Pk Lat Amp.2-3 Dist Georgi    ms ms  V cm m/s   R Sural - Ankle (Calf)      Calf NR NR NR 14 NR   L Sural - Ankle (Calf)      Calf 3.39 4.22 13.4 14 41   R Superficial peroneal - Ankle      Lat leg NR NR NR 12 NR   L Superficial peroneal - Ankle      Lat leg NR NR NR 12 NR       EMG Summary Table     Spontaneous MUAP Rcmt Note   Muscle Fib PSW Fasc IA # Amp Dur PPP Rate Type   R. Gluteus medius None None None N N N N N N N   R. Gluteus ivan None None None N N N N N N N   R. Upperparaspinal None None None N N N N N N N   R. Middleparaspinal None None None N N N N N N N   R. Lower paraspinal None None None N N N N N N N   R. Adductor dexter None None None N N N N N N N   R. Quadriceps None None None N N N N N N N   R. Tibialis anterior None None None N N N N N N N   R. Gastrocnemius  (Medial head) None None None N N N N N N N   R. Gastrocnemius (Lateral head) None None None N N N N N N N   R. Tibialis posterior None None None N N N N N N N   L. Quadriceps None None None N N N N N N N   L. Adductor dexter None None None N N N N N N N   L. Tibialis anterior None None None N N N N N N N   L. Gastrocnemius (Medial head) None None None N N N N N N N   L. Tibialis posterior None None None N N N N N N N     SUMMARY  Nerve conduction and EMG study of bilateral lower extremities shows:    Normal right peroneal distal motor latency with low normal amplitude and normal conduction velocity.  Normal left peroneal distal motor latency with low normal amplitude and normal conduction velocity.  Normal right tibial distal motor latency with low amplitude and normal conduction velocity.  Borderline prolonged left tibial distal motor latency with normal amplitude and conduction velocity.  Absent right sural and bilateral superficial peroneal sensory SNAP.    Normal left sural SNAP.  Monopolar needle exam is normal.     CLINICAL INTERPRETATION:  This is an abnormal nerve conduction and EMG study.  The study is suggestive of a sensorimotor polyneuropathy in both legs (R>L).  Further clinical correlation is needed.     Eduardo Michael MD  Neurologist  [facility] Neurology [facility]  Tel:- [phone]

## 2023-04-21 ENCOUNTER — OFFICE VISIT (OUTPATIENT)
Dept: FAMILY MEDICINE | Facility: CLINIC | Age: 63
End: 2023-04-21
Payer: COMMERCIAL

## 2023-04-21 VITALS
WEIGHT: 194.6 LBS | TEMPERATURE: 96.9 F | OXYGEN SATURATION: 98 % | DIASTOLIC BLOOD PRESSURE: 82 MMHG | RESPIRATION RATE: 16 BRPM | HEART RATE: 79 BPM | SYSTOLIC BLOOD PRESSURE: 138 MMHG | BODY MASS INDEX: 28.82 KG/M2 | HEIGHT: 69 IN

## 2023-04-21 DIAGNOSIS — Z12.31 VISIT FOR SCREENING MAMMOGRAM: ICD-10-CM

## 2023-04-21 DIAGNOSIS — B37.0 THRUSH: ICD-10-CM

## 2023-04-21 DIAGNOSIS — Z11.59 NEED FOR HEPATITIS C SCREENING TEST: ICD-10-CM

## 2023-04-21 DIAGNOSIS — E53.8 VITAMIN B 12 DEFICIENCY: ICD-10-CM

## 2023-04-21 DIAGNOSIS — S16.1XXD STRAIN OF NECK MUSCLE, SUBSEQUENT ENCOUNTER: Primary | ICD-10-CM

## 2023-04-21 DIAGNOSIS — Z11.4 SCREENING FOR HIV (HUMAN IMMUNODEFICIENCY VIRUS): ICD-10-CM

## 2023-04-21 DIAGNOSIS — A04.8 H. PYLORI INFECTION: ICD-10-CM

## 2023-04-21 DIAGNOSIS — G62.9 POLYNEUROPATHY: ICD-10-CM

## 2023-04-21 PROCEDURE — 99214 OFFICE O/P EST MOD 30 MIN: CPT | Performed by: NURSE PRACTITIONER

## 2023-04-21 RX ORDER — TIZANIDINE 2 MG/1
2 TABLET ORAL 3 TIMES DAILY
Qty: 60 TABLET | Refills: 1 | Status: SHIPPED | OUTPATIENT
Start: 2023-04-21 | End: 2023-06-20

## 2023-04-21 RX ORDER — NYSTATIN 100000/ML
500000 SUSPENSION, ORAL (FINAL DOSE FORM) ORAL 4 TIMES DAILY
Qty: 473 ML | Refills: 1 | Status: SHIPPED | OUTPATIENT
Start: 2023-04-21 | End: 2023-06-20

## 2023-04-21 ASSESSMENT — PATIENT HEALTH QUESTIONNAIRE - PHQ9
SUM OF ALL RESPONSES TO PHQ QUESTIONS 1-9: 10
10. IF YOU CHECKED OFF ANY PROBLEMS, HOW DIFFICULT HAVE THESE PROBLEMS MADE IT FOR YOU TO DO YOUR WORK, TAKE CARE OF THINGS AT HOME, OR GET ALONG WITH OTHER PEOPLE: SOMEWHAT DIFFICULT
SUM OF ALL RESPONSES TO PHQ QUESTIONS 1-9: 10

## 2023-04-21 NOTE — PROGRESS NOTES
Assessment & Plan     Strain of neck muscle, subsequent encounter  Discussed muscle release techniques, physical therapy if not improving  - tiZANidine (ZANAFLEX) 2 MG tablet; Take 1 tablet (2 mg) by mouth 3 times daily    H. pylori infection    - Helicobacter pylori Antigen Stool; Future    Thrush    - nystatin (MYCOSTATIN) 287813 UNIT/ML suspension; Take 5 mLs (500,000 Units) by mouth 4 times daily    Polyneuropathy  Per EMG/Neurolgoy recommendation  - Adult Rheumatology  Referral; Future    Vitamin B 12 deficiency    - Vitamin B12; Future    Visit for screening mammogram    - MA SCREENING DIGITAL BILAT - Future  (s+30); Future    Screening for HIV (human immunodeficiency virus)    - HIV Antigen Antibody Combo; Future    Need for hepatitis C screening test    - Hepatitis C Screen Reflex to HCV RNA Quant and Genotype; Future             CONS[facility]ERR[facility] to [facility]    FUTURE APPOINTMENTS:       - Follow-up for annual visit or as needed    See Patient Instructions    Time spent in chart review in preparation to see patient, time with patient for interview/exam, ordering medications/tests/and/or procedures, and time spent in charting and coordinating care: 30 minutes.     OLEGARIO Bolaños [facility]    Laura Woodruff is a 62 year old, presenting for the following health issues:  Results and Neck Pain        4/21/2023     6:49 AM   Additional Questions   Roomed by Yamilka Bell     History of Present Illness       Reason for visit:  Neck, EMG results, and stomach scope results    She eats 0-1 servings of fruits and vegetables daily.She consumes 2 sweetened beverage(s) daily.She exercises with enough effort to increase her heart rate 9 or less minutes per day.  She exercises with enough effort to increase her heart rate 3 or less days per week.   She is taking medications regularly.    Today's PHQ-9         PHQ-9 Total Score: 10    PHQ-9 Q9 Thoughts of  "better off dead/self-harm past 2 weeks :   Not at all    How difficult have these problems made it for you to do your work, take care of things at home, or get along with other people: Somewhat difficult       Pain History:  When did you first notice your pain? 4 weeks    Have you seen anyone else for your pain? No  How has your pain affected your ability to work? Not applicable  Where in your body do you have pain? Neck Pain  Onset/Duration: 4 weeks   Description:   Location: back of the neck   Radiation: into the right neck and into the left neck  Intensity: 5/10  Progression of Symptoms:  same  Accompanying Signs & Symptoms:  Burning, tingling, prickly sensation in arm(s): No  Numbness in arm(s): No  Weakness in arm(s):  No  Fever: No  Headache: YES- by the end of the day   Nausea and/or vomiting: No  History:   Trauma: YES- scope done prior to neck pain   Previous neck pain: No  Previous surgery or injections: No  Previous Imaging (MRI,X ray): YES- EMG 4/7/23, upper GI and colonoscopy 3/3/23  Precipitating or alleviating factors: above imaging   Does movement impact the pain:  YES- worsens  Therapies tried and outcome: acetaminophen and Ibuprofen-not effective     PROBLEMS TO ADD ON:  Needs follow up h pylori testing to ensure eradication. Since the antibiotics has had indigestion (taking Nexium) and cotton feeling with bad taste in mouth. Denies diarrhea.         Review of Systems   Constitutional, HEENT, cardiovascular, pulmonary, gi and gu systems are negative, except as otherwise noted.      Objective    /82   Pulse 79   Temp 96.9  F (36.1  C) (Tympanic)   Resp 16   Ht 1.74 m (5' 8.5\")   Wt 88.3 kg (194 lb 9.6 oz)   SpO2 98%   BMI 29.16 kg/m    Body mass index is 29.16 kg/m .  Physical Exam   GENERAL: healthy, alert and no distress  HENT: normal cephalic/atraumatic, oropharynx clear, oral mucous membranes moist and fissures to tongue, white small plaques to along inner gums (upper)  NECK: no " adenopathy, no asymmetry, masses, or scars and thyroid normal to palpation  RESP: lungs clear to auscultation - no rales, rhonchi or wheezes  CV: regular rates and rhythm, normal S1 S2, no S3 or S4 and no murmur, click or rub  ABDOMEN: soft, nontender  MS: tenderness to palpation of bilateral sternocleidomastoid muscles, limited lateral ROM  SKIN: no suspicious lesions or rashes  NEURO: Normal strength and tone, mentation intact and speech normal

## 2023-05-19 ENCOUNTER — ANCILLARY PROCEDURE (OUTPATIENT)
Dept: GENERAL RADIOLOGY | Facility: CLINIC | Age: 63
End: 2023-05-19
Attending: NURSE PRACTITIONER
Payer: COMMERCIAL

## 2023-05-19 ENCOUNTER — OFFICE VISIT (OUTPATIENT)
Dept: FAMILY MEDICINE | Facility: CLINIC | Age: 63
End: 2023-05-19
Payer: COMMERCIAL

## 2023-05-19 VITALS
OXYGEN SATURATION: 98 % | DIASTOLIC BLOOD PRESSURE: 88 MMHG | RESPIRATION RATE: 16 BRPM | WEIGHT: 193.8 LBS | HEIGHT: 69 IN | SYSTOLIC BLOOD PRESSURE: 122 MMHG | TEMPERATURE: 96.9 F | BODY MASS INDEX: 28.71 KG/M2 | HEART RATE: 85 BPM

## 2023-05-19 DIAGNOSIS — M25.561 ACUTE PAIN OF RIGHT KNEE: ICD-10-CM

## 2023-05-19 DIAGNOSIS — M25.561 ACUTE PAIN OF RIGHT KNEE: Primary | ICD-10-CM

## 2023-05-19 PROCEDURE — 73562 X-RAY EXAM OF KNEE 3: CPT | Mod: TC | Performed by: STUDENT IN AN ORGANIZED HEALTH CARE EDUCATION/TRAINING PROGRAM

## 2023-05-19 PROCEDURE — 99213 OFFICE O/P EST LOW 20 MIN: CPT | Performed by: NURSE PRACTITIONER

## 2023-05-19 NOTE — PROGRESS NOTES
"  Assessment & Plan     Acute pain of right knee    - XR Knee Right 3 Views; Future             FUTURE APPOINTMENTS:       - Follow-up visit in case of new or worsening symptoms. If not improving in a couple weeks consider ORTHO referral.     See Patient Instructions    OLEGARIO Bolaños CNP [facility]    Laura Woodruff is a 62 year old, presenting for the following health issues:  Musculoskeletal Problem        5/19/2023     6:46 AM   Additional Questions   Roomed by Yamilka VILLELA     Musculoskeletal Problem    History of Present Illness       Reason for visit:  Fell and injured my right knee and ankle. Swelled up and very painful.  Symptom onset:  1-2 weeks ago  Symptoms include:  Painful to the touch, swelling, painful when walking  Symptom intensity:  Moderate  Symptom progression:  Staying the same  Had these symptoms before:  No  What makes it worse:  Touching and a lot of walking  What makes it better:  No    She eats 0-1 servings of fruits and vegetables daily.She consumes 2 sweetened beverage(s) daily.She exercises with enough effort to increase her heart rate 9 or less minutes per day.  She exercises with enough effort to increase her heart rate 3 or less days per week.   She is taking medications regularly.         Review of Systems   Constitutional, HEENT, cardiovascular, pulmonary, gi and gu systems are negative, except as otherwise noted.      Objective    /88   Pulse 85   Temp 96.9  F (36.1  C) (Tympanic)   Resp 16   Ht 1.74 m (5' 8.5\")   Wt 87.9 kg (193 lb 12.8 oz)   SpO2 98%   BMI 29.04 kg/m    Body mass index is 29.04 kg/m .  Physical Exam   GENERAL: healthy, alert and no distress  MS: RLE exam shows normal strength and muscle mass, no deformities, no erythema, induration, or nodules, no evidence of joint effusion, ROM of all joints is normal and negative varus/valgus stress, negative Eliseo, tender to palpation at proximal tibia, scabbed abrasion " over site without erythema, warmth, edema, or drainage  NEURO: Normal strength and tone, mentation intact and speech normal    Results for orders placed or performed in visit on 05/19/23   XR Knee Right 3 Views     Status: None    Narrative    XR KNEE RIGHT 3 VIEWS 5/19/2023 7:45 AM     HISTORY: Right knee pain.    COMPARISON: None.       Impression    IMPRESSION:    No acute fracture or dislocation. Mild degenerative changes in the  medial and patellofemoral compartments. No joint effusion. Healed  proximal fibular fracture. Partially visualized proximal tibial  intramedullary thao with interlocking screws. Mild soft tissue swelling  at the anterior aspect of the proximal tibia.    REHANA EDMONDSON MD         SYSTEM ID:  [id number]

## 2023-05-19 NOTE — RESULT ENCOUNTER NOTE
Snehal Maxwell    Your xray does not show any fractures. Try wearing a knee support sleeve with activity and see if that helps.  Please let us know if you have any questions.     Take care,    OLEGARIO Eubanks CNP

## 2023-05-25 ENCOUNTER — OFFICE VISIT (OUTPATIENT)
Dept: NEUROLOGY | Facility: CLINIC | Age: 63
End: 2023-05-25
Payer: COMMERCIAL

## 2023-05-25 VITALS
DIASTOLIC BLOOD PRESSURE: 81 MMHG | HEART RATE: 65 BPM | SYSTOLIC BLOOD PRESSURE: 125 MMHG | WEIGHT: 192 LBS | BODY MASS INDEX: 28.77 KG/M2

## 2023-05-25 DIAGNOSIS — G62.9 SENSORIMOTOR NEUROPATHY: Primary | ICD-10-CM

## 2023-05-25 DIAGNOSIS — M54.81 BILATERAL OCCIPITAL NEURALGIA: ICD-10-CM

## 2023-05-25 PROCEDURE — 99214 OFFICE O/P EST MOD 30 MIN: CPT | Performed by: PSYCHIATRY & NEUROLOGY

## 2023-05-25 NOTE — LETTER
"    5/25/2023         RE: Alissa Crawley  [id number] [address] [place] [id number]        Dear Colleague,    Thank you for referring your patient, Alissa Crawley, to the [facility] [place]. Please see a copy of my visit note below.    .    ESTABLISHED PATIENT NEUROLOGY NOTE    DATE OF VISIT: 5/25/2023  MRN: [id number]  PATIENT NAME: Alissa Crawley  YOB: 1960    Chief Complaint   Patient presents with     Headache     Patient did fall and hurt her left leg. She is having numbness and tingling. Looking for test results.     SUBJECTIVE:                                                      HISTORY OF PRESENT ILLNESS:  Alissa is here for follow up regarding paresthesias. I met her for initial consultation about 4 months ago.    History as previously documented by me (1.26.23):  Per chart review, the patient was also seen in vascular surgery clinic.  That note indicates she fractured her right leg a couple of years ago requiring surgery through orthopedics.  She started to develop pain in that leg over the past year or so.  She had some screws removed from her ankle in February 2022, but this did not decrease her pain.  Pain described as shooting pain, \"bee sting.\"  Testing of her veins showed some venous insufficiency of the right saphenous vein.  Vascular did not feel that her symptoms were consistent with venous disease.  Lumbar MRI from August 2022 did not show any significant stenosis.     Kacy says that she had some testing of the Left arm, in 9.2022, which showed ulnar neuropathy, for some finger numbness.  [facility] note reflects this.  No other findings on left upper extremity EMG.  She has not had the lower extremities completed.  She is doing therapy for ulnar neuropathy and says that symptoms are improving.   She says that her Right foot goes numb, and the ankle has needle-like pains. She has sharp pains in the calf. Driving flares her pain. She broke the bones in " "her lower leg and ankle. The numbness is new since surgery. She says that after she had the hardware taken out of her ankle, she had some complications with swelling. She says she has not noticed a change with the compression stockings. She says that she feels some nerve pain in her buttock which causes dull pain down into the leg. She does have occasional numbness in the Left foot, and some buttock pain on that side, worse than on the right. She has been trying to do some stretching and other exercises but this has not helped with her symptoms.     She has done 3 months B12 shots for 3 months (461 in 11.2022, 431 in 8.2022).  No notable benefit from these.     She has seen Rheumatology. Extensive inflammatory labs have been completed.  She had a mildly elevated GIOVANNY, mildly elevated ESR at 35.  TSH was normal, Lyme negative, normal LAURIE, RNP, double-stranded DNA, SSA, SSB and RF .     She also complains of cognitive fogginess. She relies more on notes. She lives with her . Her son complains about her memory, she comments \"everyone does.\"  Her face gets red when she drinks alcohol.     I recommended brain MRI which was unremarkable.  Alissa also underwent nerve conduction studies which showed findings consistent with sensorimotor neuropathy, affecting the right leg more so than the left.  I encouraged her to also follow-up with rheumatology.    She fell again and re-injured her Right leg recently which has made the numbness worse again. She was doing better, with increased activity prior to the fall.  She describes most of the impact being on the front of her knee.  X-ray did not show any fracture.     Massage and compression stockings help with her neuropathy symptoms.   She is no longer taking gabapentin because it was ineffective. She tires easily. She is most comfortable in athletic shoes.  Other issues lead to increased pain and discomfort in the feet.    She will be doing additional blood work for her " rheumatologist next week.    She has also developed some pain in the back of her head, radiating down into the neck on both sides.  No head or neck injury prior to the development of this pain.     CURRENT MEDICATIONS:   calcium carbonate (OS-JAZZY) 600 mg calcium (1,500 mg) tablet, [CALCIUM CARBONATE (OS-JAZZY) 600 MG CALCIUM (1,500 MG) TABLET] Take 600 mg by mouth daily.  cyanocobalamin 1000 MCG tablet, Take 1,000 mcg by mouth daily  esomeprazole (NEXIUM) 20 MG capsule, Take 20 mg by mouth daily before breakfast  gabapentin (NEURONTIN) 300 MG capsule, Take 1 capsule (300 mg) by mouth 3 times daily  nystatin (MYCOSTATIN) 102852 UNIT/ML suspension, Take 5 mLs (500,000 Units) by mouth 4 times daily  sertraline (ZOLOFT) 100 MG tablet, Take 2 tablets (200 mg) by mouth daily  tiZANidine (ZANAFLEX) 2 MG tablet, Take 1 tablet (2 mg) by mouth 3 times daily  verapamil ER (VERELAN) 120 MG 24 hr capsule, Take 1 capsule (120 mg) by mouth twice a week Take 120 mg by mouth 2 (two) times a week. In the PM - on Weds and Sundays    No current facility-administered medications on file prior to visit.      RECENT DIAGNOSTIC STUDIES:   Labs: No results found for any visits on 05/25/23.    Imaging:   Brain MRI (2.2.23):  FINDINGS: There is no evidence of acute infarct, hemorrhage, mass, or  herniation. The brain parenchyma, ventricles and subarachnoid spaces  appear normal.      There is no abnormal intracranial enhancement.      The facial structures appear normal. The major arterial T2 flow voids  at the base of the brain appear patent.                                                                       IMPRESSION:  Unremarkable brain MRI without evidence of acute infarct,  mass, hemorrhage, or herniation.     XR Right knee (5.19.23):  IMPRESSION:     No acute fracture or dislocation. Mild degenerative changes in the  medial and patellofemoral compartments. No joint effusion. Healed  proximal fibular fracture. Partially visualized  proximal tibial  intramedullary thao with interlocking screws. Mild soft tissue swelling  at the anterior aspect of the proximal tibia.    Electromyogram (4.7.23):  SUMMARY  Nerve conduction and EMG study of bilateral lower extremities shows:     Normal right peroneal distal motor latency with low normal amplitude and normal conduction velocity.  Normal left peroneal distal motor latency with low normal amplitude and normal conduction velocity.  Normal right tibial distal motor latency with low amplitude and normal conduction velocity.  Borderline prolonged left tibial distal motor latency with normal amplitude and conduction velocity.  Absent right sural and bilateral superficial peroneal sensory SNAP.    Normal left sural SNAP.  Monopolar needle exam is normal.      CLINICAL INTERPRETATION:  This is an abnormal nerve conduction and EMG study.  The study is suggestive of a sensorimotor polyneuropathy in both legs (R>L).  Further clinical correlation is needed.     Imaging reviewed independently by me.  Agree with radiology read.    REVIEW OF SYSTEMS:                                                      10-point review of systems is negative except as mentioned above in HPI.    EXAM:                                                      Physical Exam:   Vitals: /81   Pulse 65   Wt 87.1 kg (192 lb)   BMI 28.77 kg/m    BMI= Body mass index is 28.77 kg/m .  GENERAL: NAD.  HEENT: NC/AT.  CV: RRR. S1, S2.   NECK: No bruits.  PULM: Non-labored breathing.   Neurologic:  MENTAL STATUS: Alert, attentive. Speech is fluent. Normal comprehension. Normal concentration. Adequate fund of knowledge.   CRANIAL NERVES: Visual fields intact to confrontation. Pupils equally, round and reactive to light. Facial sensation and movement normal. EOM full. Hearing intact to conversation. Trapezius strength intact. Palate moves symmetrically. Tongue midline.  MOTOR: 5/5 in proximal and distal muscle groups of upper and lower  extremities. Tone and bulk normal.   DTRs: Intact and symmetric in biceps, BR, patellae.  Babinski down-going bilaterally.   SENSATION: Normal light touch and pinprick, though she says the right foot feels different than the left. Intact proprioception at great toes. Vibration: Slightly decreased at both ankles.   COORDINATION: Normal finger nose finger. Finger tapping normal. Knee heel shin normal.  STATION AND GAIT: Romberg negative. Casual gait and tandem normal.  Right hand-dominant.    ASSESSMENT and PLAN:                                                      Assessment:    ICD-10-CM    1. Sensorimotor neuropathy  G62.9       2. Bilateral occipital neuralgia  M54.81 Pain Management  Referral           Ms. Crawley is a pleasant 62-year-old woman here for follow-up regarding pain and paresthesias affecting the feet/legs.  We reviewed the results of her brain MRI and EMG today.  Other than a borderline positive GIOVANNY, work-up for neuropathy has been largely unremarkable.  Her symptoms are confounded by prior right leg injury and then more recent irritation due to another fall.  Kacy is also describing occipital pain, possibly consistent with occipital neuralgia so I am sending her to pain clinic for nerve blocks.  We will plan to follow-up again in about 6 months.  Kacy understands and agrees with the plan.    Plan:  -- Follow-up with rheumatology for the additional testing as planned.    -- I agree that you should probably see the orthopedist again given the new right leg injury.  -- Return to neurology clinic in 6 months.  Please let us know if any concerns arise in the meantime.  ADDENDUM: I added ONB orders for presumed occipital neuralgia after Kacy left clinic.  We will notify her.    Total Time: 30 minutes were spent with the patient and in chart review/documentation (as described above in Assessment and Plan)/coordinating the care on date of service.    Linda George,  MD  Neurology    Dragon software used in the dictation of this note.                        Again, thank you for allowing me to participate in the care of your patient.        Sincerely,        Linda George MD

## 2023-05-25 NOTE — PROGRESS NOTES
"ESTABLISHED PATIENT NEUROLOGY NOTE    DATE OF VISIT: 5/25/2023  MRN: [id number]  PATIENT NAME: Alissa Crawley  YOB: 1960    Chief Complaint   Patient presents with     Headache     Patient did fall and hurt her left leg. She is having numbness and tingling. Looking for test results.     SUBJECTIVE:                                                      HISTORY OF PRESENT ILLNESS:  Alissa is here for follow up regarding paresthesias. I met her for initial consultation about 4 months ago.    History as previously documented by me (1.26.23):  Per chart review, the patient was also seen in vascular surgery clinic.  That note indicates she fractured her right leg a couple of years ago requiring surgery through orthopedics.  She started to develop pain in that leg over the past year or so.  She had some screws removed from her ankle in February 2022, but this did not decrease her pain.  Pain described as shooting pain, \"bee sting.\"  Testing of her veins showed some venous insufficiency of the right saphenous vein.  Vascular did not feel that her symptoms were consistent with venous disease.  Lumbar MRI from August 2022 did not show any significant stenosis.     Kacy says that she had some testing of the Left arm, in 9.2022, which showed ulnar neuropathy, for some finger numbness.  [facility] note reflects this.  No other findings on left upper extremity EMG.  She has not had the lower extremities completed.  She is doing therapy for ulnar neuropathy and says that symptoms are improving.   She says that her Right foot goes numb, and the ankle has needle-like pains. She has sharp pains in the calf. Driving flares her pain. She broke the bones in her lower leg and ankle. The numbness is new since surgery. She says that after she had the hardware taken out of her ankle, she had some complications with swelling. She says she has not noticed a change with the compression stockings. She says that she feels some nerve " "pain in her buttock which causes dull pain down into the leg. She does have occasional numbness in the Left foot, and some buttock pain on that side, worse than on the right. She has been trying to do some stretching and other exercises but this has not helped with her symptoms.     She has done 3 months B12 shots for 3 months (461 in 11.2022, 431 in 8.2022).  No notable benefit from these.     She has seen Rheumatology. Extensive inflammatory labs have been completed.  She had a mildly elevated GIOVANNY, mildly elevated ESR at 35.  TSH was normal, Lyme negative, normal LAURIE, RNP, double-stranded DNA, SSA, SSB and RF .     She also complains of cognitive fogginess. She relies more on notes. She lives with her . Her son complains about her memory, she comments \"everyone does.\"  Her face gets red when she drinks alcohol.     I recommended brain MRI which was unremarkable.  Alissa also underwent nerve conduction studies which showed findings consistent with sensorimotor neuropathy, affecting the right leg more so than the left.  I encouraged her to also follow-up with rheumatology.    She fell again and re-injured her Right leg recently which has made the numbness worse again. She was doing better, with increased activity prior to the fall.  She describes most of the impact being on the front of her knee.  X-ray did not show any fracture.     Massage and compression stockings help with her neuropathy symptoms.   She is no longer taking gabapentin because it was ineffective. She tires easily. She is most comfortable in athletic shoes.  Other issues lead to increased pain and discomfort in the feet.    She will be doing additional blood work for her rheumatologist next week.    She has also developed some pain in the back of her head, radiating down into the neck on both sides.  No head or neck injury prior to the development of this pain.     CURRENT MEDICATIONS:   calcium carbonate (OS-JAZZY) 600 mg calcium (1,500 mg) " tablet, [CALCIUM CARBONATE (OS-JAZZY) 600 MG CALCIUM (1,500 MG) TABLET] Take 600 mg by mouth daily.  cyanocobalamin 1000 MCG tablet, Take 1,000 mcg by mouth daily  esomeprazole (NEXIUM) 20 MG capsule, Take 20 mg by mouth daily before breakfast  gabapentin (NEURONTIN) 300 MG capsule, Take 1 capsule (300 mg) by mouth 3 times daily  nystatin (MYCOSTATIN) 571890 UNIT/ML suspension, Take 5 mLs (500,000 Units) by mouth 4 times daily  sertraline (ZOLOFT) 100 MG tablet, Take 2 tablets (200 mg) by mouth daily  tiZANidine (ZANAFLEX) 2 MG tablet, Take 1 tablet (2 mg) by mouth 3 times daily  verapamil ER (VERELAN) 120 MG 24 hr capsule, Take 1 capsule (120 mg) by mouth twice a week Take 120 mg by mouth 2 (two) times a week. In the PM - on Weds and Sundays    No current facility-administered medications on file prior to visit.      RECENT DIAGNOSTIC STUDIES:   Labs: No results found for any visits on 05/25/23.    Imaging:   Brain MRI (2.2.23):  FINDINGS: There is no evidence of acute infarct, hemorrhage, mass, or  herniation. The brain parenchyma, ventricles and subarachnoid spaces  appear normal.      There is no abnormal intracranial enhancement.      The facial structures appear normal. The major arterial T2 flow voids  at the base of the brain appear patent.                                                                       IMPRESSION:  Unremarkable brain MRI without evidence of acute infarct,  mass, hemorrhage, or herniation.     XR Right knee (5.19.23):  IMPRESSION:     No acute fracture or dislocation. Mild degenerative changes in the  medial and patellofemoral compartments. No joint effusion. Healed  proximal fibular fracture. Partially visualized proximal tibial  intramedullary thao with interlocking screws. Mild soft tissue swelling  at the anterior aspect of the proximal tibia.    Electromyogram (4.7.23):  SUMMARY  Nerve conduction and EMG study of bilateral lower extremities shows:     Normal right peroneal distal  motor latency with low normal amplitude and normal conduction velocity.  Normal left peroneal distal motor latency with low normal amplitude and normal conduction velocity.  Normal right tibial distal motor latency with low amplitude and normal conduction velocity.  Borderline prolonged left tibial distal motor latency with normal amplitude and conduction velocity.  Absent right sural and bilateral superficial peroneal sensory SNAP.    Normal left sural SNAP.  Monopolar needle exam is normal.      CLINICAL INTERPRETATION:  This is an abnormal nerve conduction and EMG study.  The study is suggestive of a sensorimotor polyneuropathy in both legs (R>L).  Further clinical correlation is needed.     Imaging reviewed independently by me.  Agree with radiology read.    REVIEW OF SYSTEMS:                                                      10-point review of systems is negative except as mentioned above in HPI.    EXAM:                                                      Physical Exam:   Vitals: /81   Pulse 65   Wt 87.1 kg (192 lb)   BMI 28.77 kg/m    BMI= Body mass index is 28.77 kg/m .  GENERAL: NAD.  HEENT: NC/AT.  CV: RRR. S1, S2.   NECK: No bruits.  PULM: Non-labored breathing.   Neurologic:  MENTAL STATUS: Alert, attentive. Speech is fluent. Normal comprehension. Normal concentration. Adequate fund of knowledge.   CRANIAL NERVES: Visual fields intact to confrontation. Pupils equally, round and reactive to light. Facial sensation and movement normal. EOM full. Hearing intact to conversation. Trapezius strength intact. Palate moves symmetrically. Tongue midline.  MOTOR: 5/5 in proximal and distal muscle groups of upper and lower extremities. Tone and bulk normal.   DTRs: Intact and symmetric in biceps, BR, patellae.  Babinski down-going bilaterally.   SENSATION: Normal light touch and pinprick, though she says the right foot feels different than the left. Intact proprioception at great toes. Vibration:  Slightly decreased at both ankles.   COORDINATION: Normal finger nose finger. Finger tapping normal. Knee heel shin normal.  STATION AND GAIT: Romberg negative. Casual gait and tandem normal.  Right hand-dominant.    ASSESSMENT and PLAN:                                                      Assessment:    ICD-10-CM    1. Sensorimotor neuropathy  G62.9       2. Bilateral occipital neuralgia  M54.81 Pain Management  Referral           Ms. Crawley is a pleasant 62-year-old woman here for follow-up regarding pain and paresthesias affecting the feet/legs.  We reviewed the results of her brain MRI and EMG today.  Other than a borderline positive GIOVANNY, work-up for neuropathy has been largely unremarkable.  Her symptoms are confounded by prior right leg injury and then more recent irritation due to another fall.  Kacy is also describing occipital pain, possibly consistent with occipital neuralgia so I am sending her to pain clinic for nerve blocks.  We will plan to follow-up again in about 6 months.  Kacy understands and agrees with the plan.    Plan:  -- Follow-up with rheumatology for the additional testing as planned.    -- I agree that you should probably see the orthopedist again given the new right leg injury.  -- Return to neurology clinic in 6 months.  Please let us know if any concerns arise in the meantime.  ADDENDUM: I added ONB orders for presumed occipital neuralgia after Kacy left clinic.  We will notify her.    Total Time: 30 minutes were spent with the patient and in chart review/documentation (as described above in Assessment and Plan)/coordinating the care on date of service.    Linda George MD  Neurology    Dragon software used in the dictation of this note.

## 2023-05-25 NOTE — PATIENT INSTRUCTIONS
Plan:  -- Follow-up with rheumatology for the additional testing as planned.    -- I agree that you should probably see the orthopedist again given the new right leg injury.  -- Return to neurology clinic in 6 months.  Please let us know if any concerns arise in the meantime.  ADDENDUM: I added ONB orders for presumed occipital neuralgia after Kacy left clinic.  We will notify her.

## 2023-05-30 ENCOUNTER — ONCOLOGY VISIT (OUTPATIENT)
Dept: ONCOLOGY | Facility: HOSPITAL | Age: 63
End: 2023-05-30
Attending: INTERNAL MEDICINE
Payer: COMMERCIAL

## 2023-05-30 VITALS
OXYGEN SATURATION: 99 % | HEART RATE: 63 BPM | RESPIRATION RATE: 18 BRPM | SYSTOLIC BLOOD PRESSURE: 145 MMHG | BODY MASS INDEX: 29.22 KG/M2 | HEIGHT: 69 IN | TEMPERATURE: 97.8 F | WEIGHT: 197.3 LBS | DIASTOLIC BLOOD PRESSURE: 72 MMHG

## 2023-05-30 DIAGNOSIS — D51.8 MACROCYTIC ANEMIA WITH VITAMIN B12 DEFICIENCY: ICD-10-CM

## 2023-05-30 DIAGNOSIS — D46.9 MDS (MYELODYSPLASTIC SYNDROME) (H): ICD-10-CM

## 2023-05-30 DIAGNOSIS — D53.9 MACROCYTIC ANEMIA: Primary | ICD-10-CM

## 2023-05-30 PROCEDURE — 99214 OFFICE O/P EST MOD 30 MIN: CPT | Performed by: NURSE PRACTITIONER

## 2023-05-30 RX ORDER — LORAZEPAM 1 MG/1
1 TABLET ORAL ONCE
Status: CANCELLED | OUTPATIENT
Start: 2023-05-30 | End: 2023-05-30

## 2023-05-30 RX ORDER — LIDOCAINE HYDROCHLORIDE 10 MG/ML
10 INJECTION, SOLUTION EPIDURAL; INFILTRATION; INTRACAUDAL; PERINEURAL ONCE
Status: CANCELLED | OUTPATIENT
Start: 2023-05-30 | End: 2023-05-30

## 2023-05-30 RX ORDER — HYDROCODONE BITARTRATE AND ACETAMINOPHEN 5; 325 MG/1; MG/1
1 TABLET ORAL ONCE
Status: CANCELLED | OUTPATIENT
Start: 2023-05-30 | End: 2023-05-30

## 2023-05-30 RX ORDER — FERROUS SULFATE 325(65) MG
325 TABLET ORAL DAILY
COMMUNITY
Start: 2023-03-31 | End: 2023-10-10

## 2023-05-30 ASSESSMENT — PAIN SCALES - GENERAL: PAINLEVEL: MODERATE PAIN (4)

## 2023-05-30 NOTE — PROGRESS NOTES
"Oncology Rooming Note    May 30, 2023 1:56 PM   Alissa Crawley is a 62 year old female who presents for:    Chief Complaint   Patient presents with     Oncology Clinic Visit     2 month follow up with labs related to Macrocytic anemia with vitamin B12 deficiency     Initial Vitals: BP (!) 145/72 (BP Location: Right arm, Patient Position: Sitting, Cuff Size: Adult Large)   Pulse 63   Temp 97.8  F (36.6  C) (Tympanic)   Resp 18   Ht 1.74 m (5' 8.5\")   Wt 89.5 kg (197 lb 4.8 oz)   SpO2 99%   BMI 29.56 kg/m   Estimated body mass index is 29.56 kg/m  as calculated from the following:    Height as of this encounter: 1.74 m (5' 8.5\").    Weight as of this encounter: 89.5 kg (197 lb 4.8 oz). Body surface area is 2.08 meters squared.  Moderate Pain (4) Comment: Data Unavailable   No LMP recorded. Patient has had a hysterectomy.  Allergies reviewed: Yes  Medications reviewed: Yes    Medications: Medication refills not needed today.  Pharmacy name entered into [employer]: [facility] - [facility] [address] [facility]    Clinical concerns:2 month follow up with labs related to Macrocytic anemia with vitamin B12 deficiency    JOSHUA MADISON CMA            "

## 2023-05-30 NOTE — PROGRESS NOTES
[facility] Hematology and Oncology Progress Note    Patient: Alissa Crawley  MRN: [id number]  Date of Service: May 30, 2023          Reason for Visit    Chief Complaint   Patient presents with     Oncology Clinic Visit     2 month follow up with labs related to Macrocytic anemia with vitamin B12 deficiency       Assessment and Plan     Cancer Staging   No matching staging information was found for the patient.    1.  Macrocytic anemia: This anemia has been an ongoing issue.  She saw Dr. Ribeiro about 2 months ago and she was a little low on her ferritin so he thought we could do a trial of iron.  She has been taking 1 a day for the last couple of months.  Her hemoglobin is actually little worse than it was 2 months ago.  I discussed with her about doing a bone marrow biopsy or just continuing observation and recheck in a couple months and she prefers to do the biopsy to see if we can figure out what is going on since has been going on for quite a while.  I explained to her at length what a bone marrow biopsy entails and she was agreeable to doing that.  We will get that done in the next couple weeks and then she will follow-up with Dr. Ribeiro 1 to 2 weeks after for results.  Can stop her iron at this time.      ECOG Performance    0 - Independent    Distress Screening (within last 30 days)    No data recorded     Pain  Pain Score: Moderate Pain (4)  Pain Loc: Knee (neck and right knee)    Problem List    Patient Active Problem List   Diagnosis     Chronic laryngitis     Dysthymia     Episodic cluster headache     Gastroesophageal reflux disease     Menopause     Mixed incontinence     Tibia/fibula fracture, shaft, right, sequela     Tibia/fibula fracture, shaft, right, closed, initial encounter     Tibia/fibula fracture, right, closed, initial encounter     Tibia/fibula fracture, right, closed, with delayed healing, subsequent encounter     Tibia/fibula fracture, left, closed, with malunion,  "subsequent encounter     Lumbar radiculopathy     Excessive daytime sleepiness     Macrocytic anemia with vitamin B12 deficiency     Vitamin B 12 deficiency     H. pylori infection     Polyneuropathy        ______________________________________________________________________________    History of Present Illness    Measurable disease: CBC, MCV    Treatment: Observation    Interim history: Patient is here today for 2-month follow-up visit after starting on iron.  She states that she is tolerating the iron okay.  She denies any significant issues except some anxiety about what is going on and she wants to figure out why she is anemic.  She says has been going on for quite a while and she has had a lot of testing done and no one can figure out what the problem is.  She denies any breathing issues.  Denies any infectious complaints.    Review of Systems    Pertinent items are noted in HPI.    Past History    Past Medical History:   Diagnosis Date     Chronic laryngitis 11/27/2020     Dysthymia 11/27/2020     Episodic cluster headache 11/27/2020     Gastroesophageal reflux disease 11/27/2020       PHYSICAL EXAM  BP (!) 145/72 (BP Location: Right arm, Patient Position: Sitting, Cuff Size: Adult Large)   Pulse 63   Temp 97.8  F (36.6  C) (Tympanic)   Resp 18   Ht 1.74 m (5' 8.5\")   Wt 89.5 kg (197 lb 4.8 oz)   SpO2 99%   BMI 29.56 kg/m      GENERAL: no acute distress. Cooperative in conversation. Here alone. Mask on  RESP: Regular respiratory rate. No expiratory wheezes   MUSCULOSKELETAL: no bilateral leg swelling  NEURO: non focal. Alert and oriented x3.   PSYCH: within normal limits. No depression or anxiety.  SKIN: exposed skin is dry intact.     Lab Results    Recent Results (from the past 168 hour(s))   CBC with platelets and differential   Result Value Ref Range    WBC Count 4.1 4.0 - 11.0 10e3/uL    RBC Count 3.16 (L) 3.80 - 5.20 10e6/uL    Hemoglobin 10.9 (L) 11.7 - 15.7 g/dL    Hematocrit 33.1 (L) 35.0 - " 47.0 %     (H) 78 - 100 fL    MCH 34.5 (H) 26.5 - 33.0 pg    MCHC 32.9 31.5 - 36.5 g/dL    RDW 13.3 10.0 - 15.0 %    Platelet Count 192 150 - 450 10e3/uL    % Neutrophils 47 %    % Lymphocytes 43 %    % Monocytes 9 %    % Eosinophils 1 %    % Basophils 0 %    % Immature Granulocytes 0 %    NRBCs per 100 WBC 0 <1 /100    Absolute Neutrophils 1.9 1.6 - 8.3 10e3/uL    Absolute Lymphocytes 1.8 0.8 - 5.3 10e3/uL    Absolute Monocytes 0.4 0.0 - 1.3 10e3/uL    Absolute Eosinophils 0.1 0.0 - 0.7 10e3/uL    Absolute Basophils 0.0 0.0 - 0.2 10e3/uL    Absolute Immature Granulocytes 0.0 <=0.4 10e3/uL    Absolute NRBCs 0.0 10e3/uL       Imaging    XR Knee Right 3 Views    Result Date: 5/19/2023  XR KNEE RIGHT 3 VIEWS 5/19/2023 7:45 AM HISTORY: Right knee pain. COMPARISON: None.     IMPRESSION: No acute fracture or dislocation. Mild degenerative changes in the medial and patellofemoral compartments. No joint effusion. Healed proximal fibular fracture. Partially visualized proximal tibial intramedullary thao with interlocking screws. Mild soft tissue swelling at the anterior aspect of the proximal tibia. REHANA EDMONDSON MD   SYSTEM ID:  [id number]      Total time spent with patient in face to face time, chart review and documentation was 30 minutes.  Discussed with Dr. Ribeiro    Signed by: OLEGARIO Hightower CNP

## 2023-05-30 NOTE — LETTER
"    5/30/2023         RE: Alissa Crawley  [address] [address] [place] [id number]        Dear Colleague,    Thank you for referring your patient, Alissa Crawley, to the [facility] [place]. Please see a copy of my visit note below.    Oncology Rooming Note    May 30, 2023 1:56 PM   Alissa Crawley is a 62 year old female who presents for:    Chief Complaint   Patient presents with     Oncology Clinic Visit     2 month follow up with labs related to Macrocytic anemia with vitamin B12 deficiency     Initial Vitals: BP (!) 145/72 (BP Location: Right arm, Patient Position: Sitting, Cuff Size: Adult Large)   Pulse 63   Temp 97.8  F (36.6  C) (Tympanic)   Resp 18   Ht 1.74 m (5' 8.5\")   Wt 89.5 kg (197 lb 4.8 oz)   SpO2 99%   BMI 29.56 kg/m   Estimated body mass index is 29.56 kg/m  as calculated from the following:    Height as of this encounter: 1.74 m (5' 8.5\").    Weight as of this encounter: 89.5 kg (197 lb 4.8 oz). Body surface area is 2.08 meters squared.  Moderate Pain (4) Comment: Data Unavailable   No LMP recorded. Patient has had a hysterectomy.  Allergies reviewed: Yes  Medications reviewed: Yes    Medications: Medication refills not needed today.  Pharmacy name entered into [employer]: [facility] - [facility] [id number] [facility]    Clinical concerns:2 month follow up with labs related to Macrocytic anemia with vitamin B12 deficiency    JOSHUA MADISON CMA              [facility] Hematology and Oncology Progress Note    Patient: Alissa Crawley  MRN: [id number]  Date of Service: May 30, 2023          Reason for Visit    Chief Complaint   Patient presents with     Oncology Clinic Visit     2 month follow up with labs related to Macrocytic anemia with vitamin B12 deficiency       Assessment and Plan     Cancer Staging   No matching staging information was found for the patient.    1.  Macrocytic anemia: This anemia has been an ongoing issue.  She " saw Dr. Ribeiro about 2 months ago and she was a little low on her ferritin so he thought we could do a trial of iron.  She has been taking 1 a day for the last couple of months.  Her hemoglobin is actually little worse than it was 2 months ago.  I discussed with her about doing a bone marrow biopsy or just continuing observation and recheck in a couple months and she prefers to do the biopsy to see if we can figure out what is going on since has been going on for quite a while.  I explained to her at length what a bone marrow biopsy entails and she was agreeable to doing that.  We will get that done in the next couple weeks and then she will follow-up with Dr. Ribeiro 1 to 2 weeks after for results.  Can stop her iron at this time.      ECOG Performance    0 - Independent    Distress Screening (within last 30 days)    No data recorded     Pain  Pain Score: Moderate Pain (4)  Pain Loc: Knee (neck and right knee)    Problem List    Patient Active Problem List   Diagnosis     Chronic laryngitis     Dysthymia     Episodic cluster headache     Gastroesophageal reflux disease     Menopause     Mixed incontinence     Tibia/fibula fracture, shaft, right, sequela     Tibia/fibula fracture, shaft, right, closed, initial encounter     Tibia/fibula fracture, right, closed, initial encounter     Tibia/fibula fracture, right, closed, with delayed healing, subsequent encounter     Tibia/fibula fracture, left, closed, with malunion, subsequent encounter     Lumbar radiculopathy     Excessive daytime sleepiness     Macrocytic anemia with vitamin B12 deficiency     Vitamin B 12 deficiency     H. pylori infection     Polyneuropathy        ______________________________________________________________________________    History of Present Illness    Measurable disease: CBC, MCV    Treatment: Observation    Interim history: Patient is here today for 2-month follow-up visit after starting on iron.  She states that she is tolerating  "the iron okay.  She denies any significant issues except some anxiety about what is going on and she wants to figure out why she is anemic.  She says has been going on for quite a while and she has had a lot of testing done and no one can figure out what the problem is.  She denies any breathing issues.  Denies any infectious complaints.    Review of Systems    Pertinent items are noted in HPI.    Past History    Past Medical History:   Diagnosis Date     Chronic laryngitis 11/27/2020     Dysthymia 11/27/2020     Episodic cluster headache 11/27/2020     Gastroesophageal reflux disease 11/27/2020       PHYSICAL EXAM  BP (!) 145/72 (BP Location: Right arm, Patient Position: Sitting, Cuff Size: Adult Large)   Pulse 63   Temp 97.8  F (36.6  C) (Tympanic)   Resp 18   Ht 1.74 m (5' 8.5\")   Wt 89.5 kg (197 lb 4.8 oz)   SpO2 99%   BMI 29.56 kg/m      GENERAL: no acute distress. Cooperative in conversation. Here alone. Mask on  RESP: Regular respiratory rate. No expiratory wheezes   MUSCULOSKELETAL: no bilateral leg swelling  NEURO: non focal. Alert and oriented x3.   PSYCH: within normal limits. No depression or anxiety.  SKIN: exposed skin is dry intact.     Lab Results    Recent Results (from the past 168 hour(s))   CBC with platelets and differential   Result Value Ref Range    WBC Count 4.1 4.0 - 11.0 10e3/uL    RBC Count 3.16 (L) 3.80 - 5.20 10e6/uL    Hemoglobin 10.9 (L) 11.7 - 15.7 g/dL    Hematocrit 33.1 (L) 35.0 - 47.0 %     (H) 78 - 100 fL    MCH 34.5 (H) 26.5 - 33.0 pg    MCHC 32.9 31.5 - 36.5 g/dL    RDW 13.3 10.0 - 15.0 %    Platelet Count 192 150 - 450 10e3/uL    % Neutrophils 47 %    % Lymphocytes 43 %    % Monocytes 9 %    % Eosinophils 1 %    % Basophils 0 %    % Immature Granulocytes 0 %    NRBCs per 100 WBC 0 <1 /100    Absolute Neutrophils 1.9 1.6 - 8.3 10e3/uL    Absolute Lymphocytes 1.8 0.8 - 5.3 10e3/uL    Absolute Monocytes 0.4 0.0 - 1.3 10e3/uL    Absolute Eosinophils 0.1 0.0 - 0.7 " 10e3/uL    Absolute Basophils 0.0 0.0 - 0.2 10e3/uL    Absolute Immature Granulocytes 0.0 <=0.4 10e3/uL    Absolute NRBCs 0.0 10e3/uL       Imaging    XR Knee Right 3 Views    Result Date: 5/19/2023  XR KNEE RIGHT 3 VIEWS 5/19/2023 7:45 AM HISTORY: Right knee pain. COMPARISON: None.     IMPRESSION: No acute fracture or dislocation. Mild degenerative changes in the medial and patellofemoral compartments. No joint effusion. Healed proximal fibular fracture. Partially visualized proximal tibial intramedullary thao with interlocking screws. Mild soft tissue swelling at the anterior aspect of the proximal tibia. REHANA EDMONDSON MD   SYSTEM ID:  [id number]      Total time spent with patient in face to face time, chart review and documentation was 30 minutes.  Discussed with Dr. Ribeiro    Signed by: OLEGARIO Hightower CNP      Again, thank you for allowing me to participate in the care of your patient.        Sincerely,        OLEGARIO Hightower CNP

## 2023-06-06 ENCOUNTER — LAB (OUTPATIENT)
Dept: INFUSION THERAPY | Facility: HOSPITAL | Age: 63
End: 2023-06-06
Attending: INTERNAL MEDICINE
Payer: COMMERCIAL

## 2023-06-06 ENCOUNTER — PROCEDURE ONLY VISIT (OUTPATIENT)
Dept: ONCOLOGY | Facility: HOSPITAL | Age: 63
End: 2023-06-06
Attending: NURSE PRACTITIONER
Payer: COMMERCIAL

## 2023-06-06 VITALS
OXYGEN SATURATION: 100 % | HEART RATE: 60 BPM | SYSTOLIC BLOOD PRESSURE: 163 MMHG | DIASTOLIC BLOOD PRESSURE: 87 MMHG | TEMPERATURE: 97.8 F | RESPIRATION RATE: 16 BRPM

## 2023-06-06 DIAGNOSIS — D46.9 MDS (MYELODYSPLASTIC SYNDROME) (H): ICD-10-CM

## 2023-06-06 DIAGNOSIS — D53.9 MACROCYTIC ANEMIA: ICD-10-CM

## 2023-06-06 LAB
BASOPHILS # BLD AUTO: 0 10E3/UL (ref 0–0.2)
BASOPHILS NFR BLD AUTO: 0 %
EOSINOPHIL # BLD AUTO: 0.1 10E3/UL (ref 0–0.7)
EOSINOPHIL NFR BLD AUTO: 1 %
ERYTHROCYTE [DISTWIDTH] IN BLOOD BY AUTOMATED COUNT: 13.1 % (ref 10–15)
HCT VFR BLD AUTO: 34.2 % (ref 35–47)
HGB BLD-MCNC: 11.4 G/DL (ref 11.7–15.7)
IMM GRANULOCYTES # BLD: 0 10E3/UL
IMM GRANULOCYTES NFR BLD: 0 %
INTERPRETATION: NORMAL
LAB DIRECTOR COMMENTS: NORMAL
LAB DIRECTOR DISCLAIMER: NORMAL
LAB DIRECTOR INTERPRETATION: NORMAL
LAB DIRECTOR METHODOLOGY: NORMAL
LAB DIRECTOR RESULTS: NORMAL
LYMPHOCYTES # BLD AUTO: 1.8 10E3/UL (ref 0.8–5.3)
LYMPHOCYTES NFR BLD AUTO: 38 %
MCH RBC QN AUTO: 34.3 PG (ref 26.5–33)
MCHC RBC AUTO-ENTMCNC: 33.3 G/DL (ref 31.5–36.5)
MCV RBC AUTO: 103 FL (ref 78–100)
MONOCYTES # BLD AUTO: 0.4 10E3/UL (ref 0–1.3)
MONOCYTES NFR BLD AUTO: 9 %
NEUTROPHILS # BLD AUTO: 2.4 10E3/UL (ref 1.6–8.3)
NEUTROPHILS NFR BLD AUTO: 52 %
NRBC # BLD AUTO: 0 10E3/UL
NRBC BLD AUTO-RTO: 0 /100
PLATELET # BLD AUTO: 204 10E3/UL (ref 150–450)
RBC # BLD AUTO: 3.32 10E6/UL (ref 3.8–5.2)
SIGNIFICANT RESULTS: NORMAL
SPECIMEN DESCRIPTION: NORMAL
SPECIMEN DESCRIPTION: NORMAL
TEST DETAILS, MDL: NORMAL
WBC # BLD AUTO: 4.7 10E3/UL (ref 4–11)

## 2023-06-06 PROCEDURE — 250N000013 HC RX MED GY IP 250 OP 250 PS 637: Performed by: NURSE PRACTITIONER

## 2023-06-06 PROCEDURE — 88184 FLOWCYTOMETRY/ TC 1 MARKER: CPT | Performed by: NURSE PRACTITIONER

## 2023-06-06 PROCEDURE — 38222 DX BONE MARROW BX & ASPIR: CPT | Performed by: NURSE PRACTITIONER

## 2023-06-06 PROCEDURE — 88311 DECALCIFY TISSUE: CPT | Mod: TC | Performed by: NURSE PRACTITIONER

## 2023-06-06 PROCEDURE — 88305 TISSUE EXAM BY PATHOLOGIST: CPT | Mod: 26 | Performed by: PATHOLOGY

## 2023-06-06 PROCEDURE — 88365 INSITU HYBRIDIZATION (FISH): CPT | Mod: 26 | Performed by: PATHOLOGY

## 2023-06-06 PROCEDURE — 88342 IMHCHEM/IMCYTCHM 1ST ANTB: CPT | Mod: 26 | Performed by: PATHOLOGY

## 2023-06-06 PROCEDURE — 88185 FLOWCYTOMETRY/TC ADD-ON: CPT | Performed by: NURSE PRACTITIONER

## 2023-06-06 PROCEDURE — 81450 HL NEO GSAP 5-50DNA/DNA&RNA: CPT | Performed by: NURSE PRACTITIONER

## 2023-06-06 PROCEDURE — 88291 CYTO/MOLECULAR REPORT: CPT | Performed by: MEDICAL GENETICS

## 2023-06-06 PROCEDURE — 88313 SPECIAL STAINS GROUP 2: CPT | Mod: 26 | Performed by: PATHOLOGY

## 2023-06-06 PROCEDURE — 85025 COMPLETE CBC W/AUTO DIFF WBC: CPT

## 2023-06-06 PROCEDURE — 88305 TISSUE EXAM BY PATHOLOGIST: CPT | Mod: TC | Performed by: NURSE PRACTITIONER

## 2023-06-06 PROCEDURE — G0452 MOLECULAR PATHOLOGY INTERPR: HCPCS | Mod: 26 | Performed by: STUDENT IN AN ORGANIZED HEALTH CARE EDUCATION/TRAINING PROGRAM

## 2023-06-06 PROCEDURE — 88311 DECALCIFY TISSUE: CPT | Mod: 26 | Performed by: PATHOLOGY

## 2023-06-06 PROCEDURE — 88364 INSITU HYBRIDIZATION (FISH): CPT | Mod: 26 | Performed by: PATHOLOGY

## 2023-06-06 PROCEDURE — 36415 COLL VENOUS BLD VENIPUNCTURE: CPT

## 2023-06-06 PROCEDURE — 88264 CHROMOSOME ANALYSIS 20-25: CPT | Performed by: NURSE PRACTITIONER

## 2023-06-06 PROCEDURE — 88341 IMHCHEM/IMCYTCHM EA ADD ANTB: CPT | Mod: 26 | Performed by: PATHOLOGY

## 2023-06-06 PROCEDURE — 85097 BONE MARROW INTERPRETATION: CPT | Performed by: PATHOLOGY

## 2023-06-06 PROCEDURE — 88189 FLOWCYTOMETRY/READ 16 & >: CPT | Performed by: PATHOLOGY

## 2023-06-06 PROCEDURE — 88275 CYTOGENETICS 100-300: CPT | Performed by: NURSE PRACTITIONER

## 2023-06-06 RX ORDER — HYDROCODONE BITARTRATE AND ACETAMINOPHEN 5; 325 MG/1; MG/1
1 TABLET ORAL ONCE
Status: COMPLETED | OUTPATIENT
Start: 2023-06-06 | End: 2023-06-06

## 2023-06-06 RX ORDER — LORAZEPAM 1 MG/1
1 TABLET ORAL ONCE
Status: COMPLETED | OUTPATIENT
Start: 2023-06-06 | End: 2023-06-06

## 2023-06-06 RX ADMIN — LORAZEPAM 1 MG: 1 TABLET ORAL at 09:32

## 2023-06-06 RX ADMIN — HYDROCODONE BITARTRATE AND ACETAMINOPHEN 1 TABLET: 5; 325 TABLET ORAL at 09:32

## 2023-06-06 ASSESSMENT — PAIN SCALES - GENERAL: PAINLEVEL: MODERATE PAIN (4)

## 2023-06-06 NOTE — PROGRESS NOTES
"Oncology Rooming Note    June 6, 2023 9:33 AM   Alissa Crawley is a 62 year old female who presents for:    Chief Complaint   Patient presents with     Bone Marrow Biopsy     Initial Vitals: BP (!) 146/85 (BP Location: Left arm, Patient Position: Sitting, Cuff Size: Adult Regular)   Pulse 62   Resp 14   SpO2 99%  Estimated body mass index is 29.56 kg/m  as calculated from the following:    Height as of 5/30/23: 1.74 m (5' 8.5\").    Weight as of 5/30/23: 89.5 kg (197 lb 4.8 oz). There is no height or weight on file to calculate BSA.  Moderate Pain (4) Comment: Data Unavailable   No LMP recorded. Patient has had a hysterectomy.  Allergies reviewed: Yes  Medications reviewed: Yes    Medications: Medication refills not needed today.  Pharmacy name entered into [employer]: [facility] - [facility] [address] [facility]    Clinical concerns: bone marrow biopsy Sandra Alex CNP was NOT notified.      Niki Peck RN            "

## 2023-06-06 NOTE — PROGRESS NOTES
PROCEDURE: Bone marrow Biopsy and Aspiration    INDICATIONS FOR PROCEDURE: Anemia    DESCRIPTION OF PROCEDURE: Pt was given written information about the procedure.  They provided their written informed consent.  I then went on to premedicate the patient with lorazepam and hydrocodone.  The patient got into the prone position and I sterilely prepped and draped their right posterior iliac crest.  I used 1% local lidocaine for anesthesia.  I then used an 11-gauge Jamshidi needle.  I was able to get about 10 mL of particulate aspirate. It initially clotted so I used EDTA and heparin and then got another 15cc.  I then went on to get a core of trabecular bone that was about 10 mm in size.  Patient tolerated the procedure with minimal pain and bleeding.  The specimens were sent for routine histology, flow cytometry, and cytogenetics.  I placed a sterile pressure dressing with instructions for it to remain clean dry and intact for 24 hours.  Patient will return to the clinic for follow-up of these results.

## 2023-06-06 NOTE — PROGRESS NOTES
Patient here for bone marrow biopsy. She was given written and verbal information on biopsy. Patient verbalized understanding and consent was signed. Pre-medications adminstered as ordered. Procedure was tolerated, vital signs stable. Biopsy site is clean, dry and intact. Patient was escorted out to lobby when finished.    Stacey Rangel  RN Care Coordinator  [facility]

## 2023-06-07 LAB
PATH REPORT.COMMENTS IMP SPEC: NORMAL
PATH REPORT.FINAL DX SPEC: NORMAL
PATH REPORT.MICROSCOPIC SPEC OTHER STN: NORMAL
PATH REPORT.RELEVANT HX SPEC: NORMAL

## 2023-06-19 ENCOUNTER — ANCILLARY PROCEDURE (OUTPATIENT)
Dept: MAMMOGRAPHY | Facility: CLINIC | Age: 63
End: 2023-06-19
Attending: NURSE PRACTITIONER
Payer: COMMERCIAL

## 2023-06-19 DIAGNOSIS — Z12.31 VISIT FOR SCREENING MAMMOGRAM: ICD-10-CM

## 2023-06-19 PROCEDURE — 77067 SCR MAMMO BI INCL CAD: CPT | Mod: TC | Performed by: RADIOLOGY

## 2023-06-19 PROCEDURE — 77063 BREAST TOMOSYNTHESIS BI: CPT | Mod: TC | Performed by: RADIOLOGY

## 2023-06-20 ENCOUNTER — ONCOLOGY VISIT (OUTPATIENT)
Dept: ONCOLOGY | Facility: HOSPITAL | Age: 63
End: 2023-06-20
Attending: NURSE PRACTITIONER
Payer: COMMERCIAL

## 2023-06-20 VITALS
RESPIRATION RATE: 18 BRPM | OXYGEN SATURATION: 98 % | TEMPERATURE: 97.6 F | SYSTOLIC BLOOD PRESSURE: 131 MMHG | HEIGHT: 69 IN | HEART RATE: 64 BPM | WEIGHT: 193.7 LBS | BODY MASS INDEX: 28.69 KG/M2 | DIASTOLIC BLOOD PRESSURE: 76 MMHG

## 2023-06-20 DIAGNOSIS — D53.9 MACROCYTIC ANEMIA: Primary | ICD-10-CM

## 2023-06-20 PROCEDURE — 99215 OFFICE O/P EST HI 40 MIN: CPT | Performed by: INTERNAL MEDICINE

## 2023-06-20 PROCEDURE — 99213 OFFICE O/P EST LOW 20 MIN: CPT | Performed by: INTERNAL MEDICINE

## 2023-06-20 RX ORDER — ACETAMINOPHEN 500 MG
500-1000 TABLET ORAL DAILY PRN
COMMUNITY

## 2023-06-20 ASSESSMENT — PAIN SCALES - GENERAL: PAINLEVEL: MODERATE PAIN (4)

## 2023-06-20 NOTE — LETTER
"    6/20/2023         RE: Alissa Crawley  [address] [address] [place] [id number]        Dear Colleague,    Thank you for referring your patient, Alissa Crawley, to the [facility] [place]. Please see a copy of my visit note below.    Oncology Rooming Note    June 20, 2023 9:27 AM   Alissa Crawley is a 62 year old female who presents for:    Chief Complaint   Patient presents with     Oncology Clinic Visit     Return visit to review BMBX results related to Macrocytic anemia; Macrocytic anemia with vitamin B12 deficiency; MDS (myelodysplastic syndrome)       Initial Vitals: /76 (BP Location: Right arm, Patient Position: Sitting, Cuff Size: Adult Large)   Pulse 64   Temp 97.6  F (36.4  C) (Tympanic)   Resp 18   Ht 1.74 m (5' 8.5\")   Wt 87.9 kg (193 lb 11.2 oz)   SpO2 98%   BMI 29.02 kg/m   Estimated body mass index is 29.02 kg/m  as calculated from the following:    Height as of this encounter: 1.74 m (5' 8.5\").    Weight as of this encounter: 87.9 kg (193 lb 11.2 oz). Body surface area is 2.06 meters squared.  Moderate Pain (4) Comment: Data Unavailable   No LMP recorded. Patient has had a hysterectomy.  Allergies reviewed: Yes  Medications reviewed: Yes    Medications: Medication refills not needed today.  Pharmacy name entered into [employer]: [facility] [id number] [facility]    Clinical concerns: Return visit to review BMBX results related to Macrocytic anemia; Macrocytic anemia with vitamin B12 deficiency; MDS (myelodysplastic syndrome)      JOSHUA MADISON CMA              [facility] Hematology and Oncology Consult Note    Patient: Alissa Crawley  MRN: [id number]  Date of Service: Jun 20, 2023       Reason for Visit    I was consulted by Melly Roach for anemia    Assessment/Plan    Macrocytic anemia  Bone marrow shows hypocellular marrow with trilineage hematopoiesis, unremarkable flow cytometry, low normal iron studies  No " flit 3 ITD/TKD mutations  Chromosome studies and FISH studies pending  DNMT3A  C.1474+1_1474+[id number].  Mutation is detected  H. pylori  Symptoms of fatigue, hair thinning and sleepiness  Back pain    Last CBC has shown improvement in hemoglobin up to 11.4 but there is persistent mild macrocytosis.  Bone marrow results are reviewed showing hypocellular marrow with trilineage hematopoiesis.  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  Discussed in detail about this entity with the patient.  Need to await additional results to rule out the possibility of MDS.    Most likely in either case the patient will just be recommended observation as there is no indication for any treatment.  Explained possibility of evolution of CCUS into MDS or other hematologic malignancy.    I do not think this finding explains her other symptoms of fatigue, hair thinning and sleepiness.    For now recommend observation.  We will see back in 3 months with recheck of the CBC.    Questions answered in 40 minutes spent.    Measurable disease: CBC, bone marrow    Current therapy: Observation        ECOG Performance    0 - Independent    Encounter Diagnoses:    Problem List Items Addressed This Visit    None  Visit Diagnoses     Macrocytic anemia    -  Primary    Relevant Orders    Check Out Appointment Request    CBC with platelets and differential              ______________________________________________________________________________    Staging History   Cancer Staging   No matching staging information was found for the patient.      History    Alissa is here for reevaluation.  She had bone marrow couple of weeks ago and is here to review results.  Bone marrow site remains tender with some bruising.  Continues to have other symptoms of fatigue.        March 30, 2023:    Ms. Alissa Crawley is a 62 year old with past history of hypertension and depression who is referred  for evaluation of anemia.  Patient with recent symptoms of increased fatigue, hair thinning, daytime sleepiness and back pain.  Has had extensive lab work-up showing progressive mild macrocytic anemia and so was referred for further evaluation.  Did have recent endoscopy and colonoscopy with findings of H. pylori and currently is on antibiotics to treat the H. pylori.    Has had some issues with lower back pain going into the left buttock and also right leg pain from the knee below.  Appetite has been decreased very recently due to the antibiotic therapy for H. pylori but otherwise she tends to be in overeater.  25 pound weight loss over the last 6 months which is attributed to stress and having to take care of her grandkids.  Other symptoms as above.    Remote history of low iron 25 to 30 years ago treated with iron pills and a blood transfusion but improved after hysterectomy from 6 g to 13 g.  Diagnosed with B12 deficiency about 5 years ago.  Treated with pills with good response.  Recently had a 3-month trial of parenteral B12 without improvement in any of her symptoms.    Denies any bleeding symptoms.  No blood in the urine.  No change in diet.  No abdominal surgery in the stomach, intestines or colon.  Has had cholecystectomy and hysterectomy and right knee surgery in the past.    No other hospitalizations.  She lives in [place] and is  with 2 kids.  Works as a .  Does not smoke and drinks only occasionally.    No personal history of cancer.  No family history of anemia.  Father had prostate cancer in his 60s.  No other cancer in the family.                Review of systems.  No fever or night sweats.  No loss of weight.  No lumps or bumps anywhere.  No unusual headaches or eyesight issues.  No dizziness.  No bleeding from the nose.  No sores in the mouth. No problems with swallowing.  No chest pain. No shortness of breath. No cough.  No abdominal pain. No nausea or vomiting.  No  diarrhea or constipation.  No blood in stool or black colored stools.  No problems passing urine.  No numbness or tingling in hands or feet.  No skin rashes.  A 14 point review of systems is otherwise negative.        Past History    Past Medical History:   Diagnosis Date     Chronic laryngitis 11/27/2020     Dysthymia 11/27/2020     Episodic cluster headache 11/27/2020     Gastroesophageal reflux disease 11/27/2020     Past Surgical History:   Procedure Laterality Date     CHOLECYSTECTOMY       COLONOSCOPY N/A 3/3/2023    Procedure: COLONOSCOPY, FLEXIBLE, WITH LESION REMOVAL USING SNARE;  Surgeon: Nader Gates DO;  [facility]: [facility]     COLONOSCOPY N/A 3/3/2023    Procedure: COLONOSCOPY, WITH POLYPECTOMY AND BIOPSY;  Surgeon: Nader Gates DO;  Location: [facility]     ESOPHAGOSCOPY, GASTROSCOPY, DUODENOSCOPY (EGD), COMBINED N/A 3/3/2023    Procedure: ESOPHAGOGASTRODUODENOSCOPY, WITH BIOPSY;  Surgeon: Nader Gates DO;  Location: [facility]     HYSTERECTOMY, PAP NO LONGER INDICATED       OPEN REDUCTION INTERNAL FIXATION FIBULA Right 11/28/2020    Procedure: OPEN REDUCTION INTERNAL FIXATION, FRACTURE, FIBULA;  Surgeon: Jose Dawkins MD;  Location: [facility] OR;  Service: Orthopedics     [facility] TREAT TIBIAL SHAFT FX, INTRAMED IMPLANT Right 11/28/2020    Procedure: OPEN REDUCTION INTERNAL FIXATION, FRACTURE, TIBIA, USING INTRAMEDULLARY HAYLIE;  Surgeon: Jose Dawkins MD;  Location: [facility];  Service: Orthopedics     Family History   Problem Relation Age of Onset     Arthritis Mother      Heart Disease Mother      Cancer Father         prostate     Prostate Cancer Father      Anesthesia Reaction Sister         Difficulty awakening     Clotting Disorder No family hx of      Social History     Socioeconomic History     Marital status:      Spouse name: None     Number of children: None     Years of education: None     Highest education level: None   Tobacco Use     Smoking  "status: Never     Smokeless tobacco: Never   Vaping Use     Vaping Use: Never used   Substance and Sexual Activity     Alcohol use: Yes     Comment: Alcoholic Drinks/day: \"A drink or two on the weekend\"     Drug use: Never     Sexual activity: Yes     Partners: Male         Allergies    No Known Allergies        Physical Exam    /76 (BP Location: Right arm, Patient Position: Sitting, Cuff Size: Adult Large)   Pulse 64   Temp 97.6  F (36.4  C) (Tympanic)   Resp 18   Ht 1.74 m (5' 8.5\")   Wt 87.9 kg (193 lb 11.2 oz)   SpO2 98%   BMI 29.02 kg/m        GENERAL: Alert and oriented to time place and person. Seated comfortably. In no distress.    HEAD: Atraumatic and normocephalic.    EYES: RYANN, EOMI.  No pallor.  No icterus.    Oral cavity: no mucosal lesion or tonsillar enlargement.    NECK: supple. JVP normal.  No thyroid enlargement.    LYMPH NODES: No palpable, cervical, axillary or inguinal lymphadenopathy.    CHEST: clear to auscultation bilaterally.  Resonant to percussion throughout bilaterally.  Symmetrical breath movements bilaterally.    CVS: S1 and S2 are heard. Regular rate and rhythm.  No murmur or gallop or rub heard.  No peripheral edema.    ABDOMEN: Soft. Not tender. Not distended.  No palpable hepatomegaly or splenomegaly.  No other mass palpable.  Bowel sounds heard.    EXTREMITIES: Warm.    SKIN: no rash, or bruising or purpura.  Has a full head of hair.    Lab Results    CBC 7 months ago [place] 4.6 hemoglobin 11.7 MCV 99 platelets 258  Recent CBC white count 3.7 hemoglobin 10.3 platelets 235   Normal B12 and folate and thyroid tests.  Normal CRP and sed rate.  Iron saturation 15% and ferritin of 14    Imaging Results    MA Screen Bilateral w/Tyrone    Result Date: 6/19/2023  BILATERAL FULL FIELD DIGITAL SCREENING MAMMOGRAM WITH TOMOSYNTHESIS Performed on: 6/19/23 No comparisons were made when reading this study. Technique:  This study was evaluated with the assistance of " Computer-Aided Detection.  Breast Tomosynthesis was used in interpretation. Findings: The breasts have scattered areas of fibroglandular density. There is a possible asymmetry in the right breast in the upper outer quadrant, posterior depth. There is no suspicious finding of the left breast.    IMPRESSION: BI-RADS CATEGORY: 0 - Incomplete - Need Additional Imaging RECOMMENDED FOLLOW-UP: Additional mammographic images and possible ultrasound of the right breast. The results and recommendations of this examination will be communicated to the patient and the imaging center will attempt to contact the patient within 2-3 business days to schedule follow-up imaging. Rosario Sagastume MD         Signed by: Jeremy Ribeiro MD      Again, thank you for allowing me to participate in the care of your patient.        Sincerely,        Jeremy Ribeiro MD

## 2023-06-20 NOTE — PROGRESS NOTES
[facility] Hematology and Oncology Consult Note    Patient: Alissa Crawley  MRN: [id number]  Date of Service: Jun 20, 2023       Reason for Visit    I was consulted by Melly Roach for anemia    Assessment/Plan    Macrocytic anemia  Bone marrow shows hypocellular marrow with trilineage hematopoiesis, unremarkable flow cytometry, low normal iron studies  No flit 3 ITD/TKD mutations  Chromosome studies and FISH studies pending  DNMT3A  C.1474+1_1474+[id number].  Mutation is detected  H. pylori  Symptoms of fatigue, hair thinning and sleepiness  Back pain    Last CBC has shown improvement in hemoglobin up to 11.4 but there is persistent mild macrocytosis.  Bone marrow results are reviewed showing hypocellular marrow with trilineage hematopoiesis.  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  Discussed in detail about this entity with the patient.  Need to await additional results to rule out the possibility of MDS.    Most likely in either case the patient will just be recommended observation as there is no indication for any treatment.  Explained possibility of evolution of CCUS into MDS or other hematologic malignancy.    I do not think this finding explains her other symptoms of fatigue, hair thinning and sleepiness.    For now recommend observation.  We will see back in 3 months with recheck of the CBC.    Questions answered in 40 minutes spent.    Measurable disease: CBC, bone marrow    Current therapy: Observation        ECOG Performance    0 - Independent    Encounter Diagnoses:    Problem List Items Addressed This Visit    None  Visit Diagnoses     Macrocytic anemia    -  Primary    Relevant Orders    Check Out Appointment Request    CBC with platelets and differential              ______________________________________________________________________________    Staging History   Cancer Staging   No matching staging information was found  for the patient.      History    Alissa is here for reevaluation.  She had bone marrow couple of weeks ago and is here to review results.  Bone marrow site remains tender with some bruising.  Continues to have other symptoms of fatigue.        March 30, 2023:    Ms. Alissa Crawley is a 62 year old with past history of hypertension and depression who is referred for evaluation of anemia.  Patient with recent symptoms of increased fatigue, hair thinning, daytime sleepiness and back pain.  Has had extensive lab work-up showing progressive mild macrocytic anemia and so was referred for further evaluation.  Did have recent endoscopy and colonoscopy with findings of H. pylori and currently is on antibiotics to treat the H. pylori.    Has had some issues with lower back pain going into the left buttock and also right leg pain from the knee below.  Appetite has been decreased very recently due to the antibiotic therapy for H. pylori but otherwise she tends to be in overeater.  25 pound weight loss over the last 6 months which is attributed to stress and having to take care of her grandkids.  Other symptoms as above.    Remote history of low iron 25 to 30 years ago treated with iron pills and a blood transfusion but improved after hysterectomy from 6 g to 13 g.  Diagnosed with B12 deficiency about 5 years ago.  Treated with pills with good response.  Recently had a 3-month trial of parenteral B12 without improvement in any of her symptoms.    Denies any bleeding symptoms.  No blood in the urine.  No change in diet.  No abdominal surgery in the stomach, intestines or colon.  Has had cholecystectomy and hysterectomy and right knee surgery in the past.    No other hospitalizations.  She lives in [place] and is  with 2 kids.  Works as a .  Does not smoke and drinks only occasionally.    No personal history of cancer.  No family history of anemia.  Father had prostate cancer in his 60s.  No other  cancer in the family.                Review of systems.  No fever or night sweats.  No loss of weight.  No lumps or bumps anywhere.  No unusual headaches or eyesight issues.  No dizziness.  No bleeding from the nose.  No sores in the mouth. No problems with swallowing.  No chest pain. No shortness of breath. No cough.  No abdominal pain. No nausea or vomiting.  No diarrhea or constipation.  No blood in stool or black colored stools.  No problems passing urine.  No numbness or tingling in hands or feet.  No skin rashes.  A 14 point review of systems is otherwise negative.        Past History    Past Medical History:   Diagnosis Date     Chronic laryngitis 11/27/2020     Dysthymia 11/27/2020     Episodic cluster headache 11/27/2020     Gastroesophageal reflux disease 11/27/2020     Past Surgical History:   Procedure Laterality Date     CHOLECYSTECTOMY       COLONOSCOPY N/A 3/3/2023    Procedure: COLONOSCOPY, FLEXIBLE, WITH LESION REMOVAL USING SNARE;  Surgeon: Nader Gates DO;  Location: [facility]     COLONOSCOPY N/A 3/3/2023    Procedure: COLONOSCOPY, WITH POLYPECTOMY AND BIOPSY;  Surgeon: Nader Gates DO;  Location: [facility]     ESOPHAGOSCOPY, GASTROSCOPY, DUODENOSCOPY (EGD), COMBINED N/A 3/3/2023    Procedure: ESOPHAGOGASTRODUODENOSCOPY, WITH BIOPSY;  Surgeon: Nader Gates DO;  Location: [facility]     HYSTERECTOMY, PAP NO LONGER INDICATED       OPEN REDUCTION INTERNAL FIXATION FIBULA Right 11/28/2020    Procedure: OPEN REDUCTION INTERNAL FIXATION, FRACTURE, FIBULA;  Surgeon: Jose Dawkins MD;  Location: [facility] OR;  Service: Orthopedics     [facility] TREAT TIBIAL SHAFT FX, INTRAMED IMPLANT Right 11/28/2020    Procedure: OPEN REDUCTION INTERNAL FIXATION, FRACTURE, TIBIA, USING INTRAMEDULLARY HAYLIE;  Surgeon: Jose Dawkins MD;  Location: [facility] OR;  Service: Orthopedics     Family History   Problem Relation Age of Onset     Arthritis Mother      Heart Disease Mother      Cancer  "Father         prostate     Prostate Cancer Father      Anesthesia Reaction Sister         Difficulty awakening     Clotting Disorder No family hx of      Social History     Socioeconomic History     Marital status:      Spouse name: None     Number of children: None     Years of education: None     Highest education level: None   Tobacco Use     Smoking status: Never     Smokeless tobacco: Never   Vaping Use     Vaping Use: Never used   Substance and Sexual Activity     Alcohol use: Yes     Comment: Alcoholic Drinks/day: \"A drink or two on the weekend\"     Drug use: Never     Sexual activity: Yes     Partners: Male         Allergies    No Known Allergies        Physical Exam    /76 (BP Location: Right arm, Patient Position: Sitting, Cuff Size: Adult Large)   Pulse 64   Temp 97.6  F (36.4  C) (Tympanic)   Resp 18   Ht 1.74 m (5' 8.5\")   Wt 87.9 kg (193 lb 11.2 oz)   SpO2 98%   BMI 29.02 kg/m        GENERAL: Alert and oriented to time place and person. Seated comfortably. In no distress.    HEAD: Atraumatic and normocephalic.    EYES: RYANN, EOMI.  No pallor.  No icterus.    Oral cavity: no mucosal lesion or tonsillar enlargement.    NECK: supple. JVP normal.  No thyroid enlargement.    LYMPH NODES: No palpable, cervical, axillary or inguinal lymphadenopathy.    CHEST: clear to auscultation bilaterally.  Resonant to percussion throughout bilaterally.  Symmetrical breath movements bilaterally.    CVS: S1 and S2 are heard. Regular rate and rhythm.  No murmur or gallop or rub heard.  No peripheral edema.    ABDOMEN: Soft. Not tender. Not distended.  No palpable hepatomegaly or splenomegaly.  No other mass palpable.  Bowel sounds heard.    EXTREMITIES: Warm.    SKIN: no rash, or bruising or purpura.  Has a full head of hair.    Lab Results    CBC 7 months ago [place] 4.6 hemoglobin 11.7 MCV 99 platelets 258  Recent CBC white count 3.7 hemoglobin 10.3 platelets 235   Normal B12 and folate and " thyroid tests.  Normal CRP and sed rate.  Iron saturation 15% and ferritin of 14    Imaging Results    MA Screen Bilateral w/Tyrone    Result Date: 6/19/2023  BILATERAL FULL FIELD DIGITAL SCREENING MAMMOGRAM WITH TOMOSYNTHESIS Performed on: 6/19/23 No comparisons were made when reading this study. Technique:  This study was evaluated with the assistance of Computer-Aided Detection.  Breast Tomosynthesis was used in interpretation. Findings: The breasts have scattered areas of fibroglandular density. There is a possible asymmetry in the right breast in the upper outer quadrant, posterior depth. There is no suspicious finding of the left breast.    IMPRESSION: BI-RADS CATEGORY: 0 - Incomplete - Need Additional Imaging RECOMMENDED FOLLOW-UP: Additional mammographic images and possible ultrasound of the right breast. The results and recommendations of this examination will be communicated to the patient and the imaging center will attempt to contact the patient within 2-3 business days to schedule follow-up imaging. Rosario Sagastume MD         Signed by: Jeremy Ribeiro MD

## 2023-06-20 NOTE — PROGRESS NOTES
"Oncology Rooming Note    June 20, 2023 9:27 AM   Alissa Crawley is a 62 year old female who presents for:    Chief Complaint   Patient presents with     Oncology Clinic Visit     Return visit to review BMBX results related to Macrocytic anemia; Macrocytic anemia with vitamin B12 deficiency; MDS (myelodysplastic syndrome)       Initial Vitals: /76 (BP Location: Right arm, Patient Position: Sitting, Cuff Size: Adult Large)   Pulse 64   Temp 97.6  F (36.4  C) (Tympanic)   Resp 18   Ht 1.74 m (5' 8.5\")   Wt 87.9 kg (193 lb 11.2 oz)   SpO2 98%   BMI 29.02 kg/m   Estimated body mass index is 29.02 kg/m  as calculated from the following:    Height as of this encounter: 1.74 m (5' 8.5\").    Weight as of this encounter: 87.9 kg (193 lb 11.2 oz). Body surface area is 2.06 meters squared.  Moderate Pain (4) Comment: Data Unavailable   No LMP recorded. Patient has had a hysterectomy.  Allergies reviewed: Yes  Medications reviewed: Yes    Medications: Medication refills not needed today.  Pharmacy name entered into [employer]: [facility] - [facility] [id number] [facility]    Clinical concerns: Return visit to review BMBX results related to Macrocytic anemia; Macrocytic anemia with vitamin B12 deficiency; MDS (myelodysplastic syndrome)      JOSHUA MADISON CMA            "

## 2023-06-28 DIAGNOSIS — S16.1XXD STRAIN OF NECK MUSCLE, SUBSEQUENT ENCOUNTER: ICD-10-CM

## 2023-06-28 RX ORDER — TIZANIDINE 2 MG/1
2 TABLET ORAL 3 TIMES DAILY
Qty: 60 TABLET | Refills: 1 | OUTPATIENT
Start: 2023-06-28

## 2023-07-03 ENCOUNTER — HOSPITAL ENCOUNTER (OUTPATIENT)
Dept: MAMMOGRAPHY | Facility: CLINIC | Age: 63
Discharge: HOME | End: 2023-07-03
Attending: NURSE PRACTITIONER
Payer: COMMERCIAL

## 2023-07-03 ENCOUNTER — HOSPITAL ENCOUNTER (OUTPATIENT)
Dept: ULTRASOUND IMAGING | Facility: CLINIC | Age: 63
Discharge: HOME | End: 2023-07-03
Attending: NURSE PRACTITIONER
Payer: COMMERCIAL

## 2023-07-03 DIAGNOSIS — R92.8 ABNORMAL MAMMOGRAM: ICD-10-CM

## 2023-07-03 PROCEDURE — 76642 ULTRASOUND BREAST LIMITED: CPT | Mod: RT

## 2023-07-03 PROCEDURE — 77061 BREAST TOMOSYNTHESIS UNI: CPT | Mod: RT

## 2023-07-08 ENCOUNTER — HEALTH MAINTENANCE LETTER (OUTPATIENT)
Age: 63
End: 2023-07-08

## 2023-07-10 LAB
CULTURE HARVEST COMPLETE DATE: NORMAL
CULTURE HARVEST COMPLETE DATE: NORMAL
INTERPRETATION: NORMAL
ISCN: NORMAL
METHODS: NORMAL

## 2023-07-13 LAB — CULTURE HARVEST COMPLETE DATE: NORMAL

## 2023-08-22 NOTE — PROGRESS NOTES
CHIEF COMPLAINT:   Chief Complaint   Patient presents with    Right Knee - Pain     Anterior pain. Onset: 5/2023. Fell directly on knee cap from [place], missing 2 steps. Has antegrade tibial nail from 2020 at [facility]. Treatment is post-op exercises from surgery every day. Ibuprofen and tylenol PRN, don't really help at all.        Alisas Crawley is seen today in the [facility] for evaluation of right knee pain at the request of Melly Roach         HISTORY OF PRESENT ILLNESS    Alissa Crawley is a 62 year old female seen for evaluation of ongoing right knee pain status post recent fall 5/2023, onto the front of the knee. Has had pain in the front of the knee and clicking since then. Also some ankle pain. Also has numbness front/outer aspect of the right knee. The entire leg aches from the knee down since injury 11/2020.     She had previous tibial shaft fracture treated with intramedullary thao 11/28/2020 at [facility]. Denies problems with the knee or ankle until this recent incident, other than entire leg aching and toe numbness. Has some pinching anterior ankle at times as well.    Treatment has been home exercise program, stretching      Present symptoms: pain anteriorly, pain sharp, shooting, dull/achy , mild pain, mild swelling.    Pain severity: 3/10  Frequency of symptoms: are constant  Exacerbating Factors: weight bearing, stairs, [id number]  Relieving Factors: rest, sitting  Night Pain: Yes  Pain while at rest: Yes   Numbness or tingling: Yes   Patient has tried:     NSAIDS: No      Physical Therapy: No      Activity modification: Yes      Bracing: No      Injections: No      Ice: No      Assistive device:  No    Other PMH:  has a past medical history of Chronic laryngitis (11/27/2020), Dysthymia (11/27/2020), Episodic cluster headache (11/27/2020), and Gastroesophageal reflux disease (11/27/2020).  Patient Active Problem List   Diagnosis    Chronic  laryngitis    Dysthymia    Episodic cluster headache    Gastroesophageal reflux disease    Menopause    Mixed incontinence    Tibia/fibula fracture, shaft, right, sequela    Tibia/fibula fracture, shaft, right, closed, initial encounter    Tibia/fibula fracture, right, closed, initial encounter    Tibia/fibula fracture, right, closed, with delayed healing, subsequent encounter    Tibia/fibula fracture, left, closed, with malunion, subsequent encounter    Lumbar radiculopathy    Excessive daytime sleepiness    Macrocytic anemia with vitamin B12 deficiency    Vitamin B 12 deficiency    H. pylori infection    Polyneuropathy       Surgical Hx:  has a past surgical history that includes Cholecystectomy; TREAT TIBIAL SHAFT FX, INTRAMED IMPLANT (Right, 11/28/2020); Open reduction internal fixation fibula (Right, 11/28/2020); Hysterectomy, Pap No Longer Indicated; Colonoscopy (N/A, 3/3/2023); Esophagoscopy, gastroscopy, duodenoscopy (EGD), combined (N/A, 3/3/2023); and Colonoscopy (N/A, 3/3/2023).    Medications:   Current Outpatient Medications:     acetaminophen (TYLENOL) 500 MG tablet, Take 500-1,000 mg by mouth daily as needed for mild pain, Disp: , Rfl:     calcium carbonate (OS-JAZZY) 600 mg calcium (1,500 mg) tablet, [CALCIUM CARBONATE (OS-JAZZY) 600 MG CALCIUM (1,500 MG) TABLET] Take 600 mg by mouth daily., Disp: , Rfl:     cyanocobalamin 1000 MCG tablet, Take 1,000 mcg by mouth daily, Disp: , Rfl:     esomeprazole (NEXIUM) 20 MG capsule, Take 20 mg by mouth daily before breakfast, Disp: , Rfl:     ferrous sulfate (FEROSUL) 325 (65 Fe) MG tablet, Take 325 mg by mouth daily, Disp: , Rfl:     sertraline (ZOLOFT) 100 MG tablet, Take 2 tablets (200 mg) by mouth daily, Disp: 180 tablet, Rfl: 3    verapamil ER (VERELAN) 120 MG 24 hr capsule, Take 1 capsule (120 mg) by mouth twice a week Take 120 mg by mouth 2 (two) times a week. In the PM - on Weds and Sundays, Disp: 24 capsule, Rfl: 3    Allergies: No Known  "Allergies    Social Hx: .   reports that she has never smoked. She has never used smokeless tobacco. She reports current alcohol use. She reports that she does not use drugs.    Family Hx: family history includes Anesthesia Reaction in her sister; Arthritis in her mother; Cancer in her father; Heart Disease in her mother; Prostate Cancer in her father.    REVIEW OF SYSTEMS: 10 point ROS neg other than the symptoms noted above in the HPI and PMH. Notables include  CONSTITUTIONAL:NEGATIVE for fever, chills, change in weight  INTEGUMENTARY/SKIN: NEGATIVE for worrisome rashes, moles or lesions  MUSCULOSKELETAL:See HPI above  NEURO: NEGATIVE for weakness, dizziness or paresthesias    PHYSICAL EXAM:  Ht 1.74 m (5' 8.5\")   Wt 87.5 kg (193 lb)   BMI 28.92 kg/m     GENERAL APPEARANCE: healthy, alert, no distress  SKIN: no suspicious lesions or rashes  NEURO: Normal strength and tone, mentation intact and speech normal  PSYCH:  mentation appears normal and affect normal, not anxious  RESPIRATORY: No increased work of breathing.  HANDS: no clubbing, nail pitting  LYMPH: no palpable popliteal lymphadenopathy.    BILATERAL LOWER EXTREMITIES:  Gait: normal  No gross deformities or masses.  No Quad atrophy, strength normal.  Intact sensation deep peroneal nerve, superficial peroneal nerve, med/lat tibial nerve, sural nerve, saphenous nerve  Intact EHL, EDL, TA, FHL, GS, quadriceps hamstrings and hip flexors  Bilateral calf soft and nttp or squeeze.  DTRs: achilles 2+, patella 2+.  Edema: trace    LEFT KNEE EXAM:    Skin: intact, no ecchymosis or erythema  ROM: full extension to 130 flexion  Tight hamstrings on straight leg raise.  Effusion: none  Tender: NTTP med/lat joint line, anterior or posterior knee  McMurrays: negative    MCL: stable, and non-painful at both 0 and 30 degrees knee flexion  Varus stress: stable, and non-painful at both 0 and 30 degrees knee flexion  Lachmans: neg, firm endpoint  Posterior " "Drawer stable  Patellofemoral joint:                Apprehension: negative              Crepitations: mild   Grind: positive.    RIGHT KNEE EXAM:    Skin: intact, no ecchymosis or erythema; there are surgical scars anterior knee just medial off midline as well as along the pes area    ROM: full extension to 125 flexion, anterior discomfort.  Tight hamstrings on straight leg raise.  Effusion: none  Tender: patella, anterior knee, patella tendon  NTTP med/lat joint line,  posterior knee  McMurrays: negative    MCL: stable, and non-painful at both 0 and 30 degrees knee flexion  Varus stress: stable, and non-painful at both 0 and 30 degrees knee flexion  Lachmans: neg, firm endpoint  Posterior Drawer stable  Patellofemoral joint:                Apprehension: negative              Crepitations: mild   Grind: positive.    X-RAY:  3 views right knee from 5/19/2023 were reviewed in clinic today. On my review, no obvious fractures or dislocations.   Mild degenerative changes in the medial and patellofemoral compartments. No joint effusion. Healed proximal fibular fracture. Partially visualized proximal tibial intramedullary thao with interlocking screws. Mild soft tissue swelling at the anterior aspect of the proximal tibia.         ASSESSMENT/PLAN: Alissa Crawley is a 62 year old female with acute on chronic right anterior knee pain, contusion, mild osteoarthritis; history of tibial nail.     * reviewed imaging studies with patient, showing some mild arthritic changes, or wearing of the cartilage in the knee. This can be caused by normal \"wear and tear\" over the years or following prior injury to the knee.  Suspect direct blow to the front of the knee has set things off with the underlying osteoarthritis, as well as soft tissue contusion and bone contusion.    Non-surgical treatment for knee arthritis includes:    * rest, sitting  * Activity modification - avoid impact activities or activities that aggravate symptoms.  * " "NSAIDS (non-steroidal anti-inflammatory medications; e.g. Aleve, advil, motrin, ibuprofen) - regular use for inflammation ( twice daily or three times daily), with food, as long as no contra-indications Please discuss with primary care doctor if needed  * ice, 15-20 minutes at a time several times a day or as needed.  * Strengthening of quadriceps muscles  * Physical Therapy for strengthening, stretching and range of motion exercises of legs  * Tylenol as needed for pain, consider Tylenol arthritis or similar  * Weight loss: Weight loss:  Body mass index is 28.92 kg/m .. weight loss benefits, not only for the current pain symptoms, but also overall health. Recommend a good diet plan that works for the patient, with the assistance of a dietician or primary care doctor as needed. Also, a good, low-impact exercise program for at least 20 minutes per day, 3 times per week, such as exercise bike, elliptical , or pool.  * Exercise: low impact such as stationary bike, elliptical, pool.  * Injections: cortisone versus viscosupplementation (hyaluronic acid, \"rooster comb\", \"gel shots\"); risks and perceived benefits discussed today. Patient elects to proceed.  * Bracing: bracing the knee may offer some relief of symptoms when worn and provide some stability.  * over the counter supplements such as glucosamine and chondroitin sulfate may help with joint pain.  * topical ointments may help as well    * return to clinic as needed.         Arias Villanueva M.D., M.S.  Dept. of Orthopaedic Surgery  [facility]    Large Joint Injection/Arthocentesis: R knee joint    Date/Time: 8/31/2023 4:48 PM    Performed by: Christopher Lagos PA  Authorized by: Arias Villanueva MD    Indications:  Pain and osteoarthritis  Needle Size:  22 G  Guidance: landmark guided    Approach:  Anteromedial  Location:  Knee      Medications:  80 mg methylPREDNISolone 80 MG/ML; 4 mL BUPivacaine 0.25 %  Outcome:  Tolerated well, no " immediate complications  Procedure discussed: discussed risks, benefits, and alternatives    Consent Given by:  Patient  Prep: patient was prepped and draped in usual sterile fashion

## 2023-08-31 ENCOUNTER — OFFICE VISIT (OUTPATIENT)
Dept: ORTHOPEDICS | Facility: CLINIC | Age: 63
End: 2023-08-31
Attending: NURSE PRACTITIONER
Payer: COMMERCIAL

## 2023-08-31 VITALS — BODY MASS INDEX: 28.58 KG/M2 | WEIGHT: 193 LBS | HEIGHT: 69 IN

## 2023-08-31 DIAGNOSIS — G89.29 CHRONIC PAIN OF RIGHT KNEE: Primary | ICD-10-CM

## 2023-08-31 DIAGNOSIS — M79.604 PAIN OF RIGHT LOWER EXTREMITY: ICD-10-CM

## 2023-08-31 DIAGNOSIS — M25.561 CHRONIC PAIN OF RIGHT KNEE: Primary | ICD-10-CM

## 2023-08-31 PROCEDURE — 99243 OFF/OP CNSLTJ NEW/EST LOW 30: CPT | Mod: 25 | Performed by: ORTHOPAEDIC SURGERY

## 2023-08-31 PROCEDURE — 20610 DRAIN/INJ JOINT/BURSA W/O US: CPT | Mod: RT | Performed by: ORTHOPAEDIC SURGERY

## 2023-08-31 RX ORDER — METHYLPREDNISOLONE ACETATE 80 MG/ML
80 INJECTION, SUSPENSION INTRA-ARTICULAR; INTRALESIONAL; INTRAMUSCULAR; SOFT TISSUE
Status: SHIPPED | OUTPATIENT
Start: 2023-08-31

## 2023-08-31 RX ORDER — BUPIVACAINE HYDROCHLORIDE 2.5 MG/ML
4 INJECTION, SOLUTION INFILTRATION; PERINEURAL
Status: SHIPPED | OUTPATIENT
Start: 2023-08-31

## 2023-08-31 RX ADMIN — BUPIVACAINE HYDROCHLORIDE 4 ML: 2.5 INJECTION, SOLUTION INFILTRATION; PERINEURAL at 16:48

## 2023-08-31 RX ADMIN — METHYLPREDNISOLONE ACETATE 80 MG: 80 INJECTION, SUSPENSION INTRA-ARTICULAR; INTRALESIONAL; INTRAMUSCULAR; SOFT TISSUE at 16:48

## 2023-08-31 ASSESSMENT — PAIN SCALES - GENERAL: PAINLEVEL: MILD PAIN (3)

## 2023-08-31 NOTE — LETTER
8/31/2023         RE: Alissa Crawley  [address] [address] [place] [id number]        Dear Colleague,    Thank you for referring your patient, Alissa Crawley, to the [facility] [place]. Please see a copy of my visit note below.    CHIEF COMPLAINT:   Chief Complaint   Patient presents with     Right Knee - Pain     Anterior pain. Onset: 5/2023. Fell directly on knee cap from deck, missing 2 steps. Has antegrade tibial nail from 2020 at [facility]. Treatment is post-op exercises from surgery every day. Ibuprofen and tylenol PRN, don't really help at all.        Alissa Crawley is seen today in the [facility] for evaluation of right knee pain at the request of Melly Roach         HISTORY OF PRESENT ILLNESS    Alissa Crawley is a 62 year old female seen for evaluation of ongoing right knee pain status post recent fall 5/2023, onto the front of the knee. Has had pain in the front of the knee and clicking since then. Also some ankle pain. Also has numbness front/outer aspect of the right knee. The entire leg aches from the knee down since injury 11/2020.     She had previous tibial shaft fracture treated with intramedullary thao 11/28/2020 at [facility]. Denies problems with the knee or ankle until this recent incident, other than entire leg aching and toe numbness. Has some pinching anterior ankle at times as well.    Treatment has been home exercise program, stretching      Present symptoms: pain anteriorly, pain sharp, shooting, dull/achy , mild pain, mild swelling.    Pain severity: 3/10  Frequency of symptoms: are constant  Exacerbating Factors: weight bearing, stairs, [id number]  Relieving Factors: rest, sitting  Night Pain: Yes  Pain while at rest: Yes   Numbness or tingling: Yes   Patient has tried:     NSAIDS: No      Physical Therapy: No      Activity modification: Yes      Bracing: No      Injections: No      Ice: No       Assistive device:  No    Other PMH:  has a past medical history of Chronic laryngitis (11/27/2020), Dysthymia (11/27/2020), Episodic cluster headache (11/27/2020), and Gastroesophageal reflux disease (11/27/2020).  Patient Active Problem List   Diagnosis     Chronic laryngitis     Dysthymia     Episodic cluster headache     Gastroesophageal reflux disease     Menopause     Mixed incontinence     Tibia/fibula fracture, shaft, right, sequela     Tibia/fibula fracture, shaft, right, closed, initial encounter     Tibia/fibula fracture, right, closed, initial encounter     Tibia/fibula fracture, right, closed, with delayed healing, subsequent encounter     Tibia/fibula fracture, left, closed, with malunion, subsequent encounter     Lumbar radiculopathy     Excessive daytime sleepiness     Macrocytic anemia with vitamin B12 deficiency     Vitamin B 12 deficiency     H. pylori infection     Polyneuropathy       Surgical Hx:  has a past surgical history that includes Cholecystectomy; TREAT TIBIAL SHAFT FX, INTRAMED IMPLANT (Right, 11/28/2020); Open reduction internal fixation fibula (Right, 11/28/2020); Hysterectomy, Pap No Longer Indicated; Colonoscopy (N/A, 3/3/2023); Esophagoscopy, gastroscopy, duodenoscopy (EGD), combined (N/A, 3/3/2023); and Colonoscopy (N/A, 3/3/2023).    Medications:   Current Outpatient Medications:      acetaminophen (TYLENOL) 500 MG tablet, Take 500-1,000 mg by mouth daily as needed for mild pain, Disp: , Rfl:      calcium carbonate (OS-JAZZY) 600 mg calcium (1,500 mg) tablet, [CALCIUM CARBONATE (OS-JAZZY) 600 MG CALCIUM (1,500 MG) TABLET] Take 600 mg by mouth daily., Disp: , Rfl:      cyanocobalamin 1000 MCG tablet, Take 1,000 mcg by mouth daily, Disp: , Rfl:      esomeprazole (NEXIUM) 20 MG capsule, Take 20 mg by mouth daily before breakfast, Disp: , Rfl:      ferrous sulfate (FEROSUL) 325 (65 Fe) MG tablet, Take 325 mg by mouth daily, Disp: , Rfl:      sertraline (ZOLOFT) 100 MG tablet, Take 2  "tablets (200 mg) by mouth daily, Disp: 180 tablet, Rfl: 3     verapamil ER (VERELAN) 120 MG 24 hr capsule, Take 1 capsule (120 mg) by mouth twice a week Take 120 mg by mouth 2 (two) times a week. In the PM - on Weds and Sundays, Disp: 24 capsule, Rfl: 3    Allergies: No Known Allergies    Social Hx: .   reports that she has never smoked. She has never used smokeless tobacco. She reports current alcohol use. She reports that she does not use drugs.    Family Hx: family history includes Anesthesia Reaction in her sister; Arthritis in her mother; Cancer in her father; Heart Disease in her mother; Prostate Cancer in her father.    REVIEW OF SYSTEMS: 10 point ROS neg other than the symptoms noted above in the HPI and PMH. Notables include  CONSTITUTIONAL:NEGATIVE for fever, chills, change in weight  INTEGUMENTARY/SKIN: NEGATIVE for worrisome rashes, moles or lesions  MUSCULOSKELETAL:See HPI above  NEURO: NEGATIVE for weakness, dizziness or paresthesias    PHYSICAL EXAM:  Ht 1.74 m (5' 8.5\")   Wt 87.5 kg (193 lb)   BMI 28.92 kg/m     GENERAL APPEARANCE: healthy, alert, no distress  SKIN: no suspicious lesions or rashes  NEURO: Normal strength and tone, mentation intact and speech normal  PSYCH:  mentation appears normal and affect normal, not anxious  RESPIRATORY: No increased work of breathing.  HANDS: no clubbing, nail pitting  LYMPH: no palpable popliteal lymphadenopathy.    BILATERAL LOWER EXTREMITIES:  Gait: normal  No gross deformities or masses.  No Quad atrophy, strength normal.  Intact sensation deep peroneal nerve, superficial peroneal nerve, med/lat tibial nerve, sural nerve, saphenous nerve  Intact EHL, EDL, TA, FHL, GS, quadriceps hamstrings and hip flexors  Bilateral calf soft and nttp or squeeze.  DTRs: achilles 2+, patella 2+.  Edema: trace    LEFT KNEE EXAM:    Skin: intact, no ecchymosis or erythema  ROM: full extension to 130 flexion  Tight hamstrings on straight leg raise.  Effusion: " "none  Tender: NTTP med/lat joint line, anterior or posterior knee  McMurrays: negative    MCL: stable, and non-painful at both 0 and 30 degrees knee flexion  Varus stress: stable, and non-painful at both 0 and 30 degrees knee flexion  Lachmans: neg, firm endpoint  Posterior Drawer stable  Patellofemoral joint:                Apprehension: negative              Crepitations: mild   Grind: positive.    RIGHT KNEE EXAM:    Skin: intact, no ecchymosis or erythema; there are surgical scars anterior knee just medial off midline as well as along the pes area    ROM: full extension to 125 flexion, anterior discomfort.  Tight hamstrings on straight leg raise.  Effusion: none  Tender: patella, anterior knee, patella tendon  NTTP med/lat joint line,  posterior knee  McMurrays: negative    MCL: stable, and non-painful at both 0 and 30 degrees knee flexion  Varus stress: stable, and non-painful at both 0 and 30 degrees knee flexion  Lachmans: neg, firm endpoint  Posterior Drawer stable  Patellofemoral joint:                Apprehension: negative              Crepitations: mild   Grind: positive.    X-RAY:  3 views right knee from 5/19/2023 were reviewed in clinic today. On my review, no obvious fractures or dislocations.   Mild degenerative changes in the medial and patellofemoral compartments. No joint effusion. Healed proximal fibular fracture. Partially visualized proximal tibial intramedullary thao with interlocking screws. Mild soft tissue swelling at the anterior aspect of the proximal tibia.         ASSESSMENT/PLAN: Alissa Crawley is a 62 year old female with acute on chronic right anterior knee pain, contusion, mild osteoarthritis; history of tibial nail.     * reviewed imaging studies with patient, showing some mild arthritic changes, or wearing of the cartilage in the knee. This can be caused by normal \"wear and tear\" over the years or following prior injury to the knee.  Suspect direct blow to the front of the knee " "has set things off with the underlying osteoarthritis, as well as soft tissue contusion and bone contusion.    Non-surgical treatment for knee arthritis includes:    * rest, sitting  * Activity modification - avoid impact activities or activities that aggravate symptoms.  * NSAIDS (non-steroidal anti-inflammatory medications; e.g. Aleve, advil, motrin, ibuprofen) - regular use for inflammation ( twice daily or three times daily), with food, as long as no contra-indications Please discuss with primary care doctor if needed  * ice, 15-20 minutes at a time several times a day or as needed.  * Strengthening of quadriceps muscles  * Physical Therapy for strengthening, stretching and range of motion exercises of legs  * Tylenol as needed for pain, consider Tylenol arthritis or similar  * Weight loss: Weight loss:  Body mass index is 28.92 kg/m .. weight loss benefits, not only for the current pain symptoms, but also overall health. Recommend a good diet plan that works for the patient, with the assistance of a dietician or primary care doctor as needed. Also, a good, low-impact exercise program for at least 20 minutes per day, 3 times per week, such as exercise bike, elliptical , or pool.  * Exercise: low impact such as stationary bike, elliptical, pool.  * Injections: cortisone versus viscosupplementation (hyaluronic acid, \"rooster comb\", \"gel shots\"); risks and perceived benefits discussed today. Patient elects to proceed.  * Bracing: bracing the knee may offer some relief of symptoms when worn and provide some stability.  * over the counter supplements such as glucosamine and chondroitin sulfate may help with joint pain.  * topical ointments may help as well    * return to clinic as needed.         Arias Villanueva M.D., M.S.  Dept. of Orthopaedic Surgery  [facility]    Large Joint Injection/Arthocentesis: R knee joint    Date/Time: 8/31/2023 4:48 PM    Performed by: Christopher Lagos, " PA  Authorized by: Arias Villanueva MD    Indications:  Pain and osteoarthritis  Needle Size:  22 G  Guidance: landmark guided    Approach:  Anteromedial  Location:  Knee      Medications:  80 mg methylPREDNISolone 80 MG/ML; 4 mL BUPivacaine 0.25 %  Outcome:  Tolerated well, no immediate complications  Procedure discussed: discussed risks, benefits, and alternatives    Consent Given by:  Patient  Prep: patient was prepped and draped in usual sterile fashion               Again, thank you for allowing me to participate in the care of your patient.        Sincerely,        Arias Villanueva MD

## 2023-09-12 ENCOUNTER — TELEPHONE (OUTPATIENT)
Dept: FAMILY MEDICINE | Facility: CLINIC | Age: 63
End: 2023-09-12
Payer: COMMERCIAL

## 2023-09-12 NOTE — TELEPHONE ENCOUNTER
Patient Quality Outreach    Patient is due for the following:   Depression  -  PHQ-9 needed    Next Steps:   Patient was assigned appropriate questionnaire to complete    Type of outreach:    Sent Collections message.      Questions for provider review:               OLEGARIO Bolaños CNP

## 2023-10-06 ASSESSMENT — ANXIETY QUESTIONNAIRES
1. FEELING NERVOUS, ANXIOUS, OR ON EDGE: SEVERAL DAYS
7. FEELING AFRAID AS IF SOMETHING AWFUL MIGHT HAPPEN: SEVERAL DAYS
GAD7 TOTAL SCORE: 9
5. BEING SO RESTLESS THAT IT IS HARD TO SIT STILL: NOT AT ALL
GAD7 TOTAL SCORE: 9
4. TROUBLE RELAXING: NEARLY EVERY DAY
6. BECOMING EASILY ANNOYED OR IRRITABLE: NOT AT ALL
2. NOT BEING ABLE TO STOP OR CONTROL WORRYING: MORE THAN HALF THE DAYS
IF YOU CHECKED OFF ANY PROBLEMS ON THIS QUESTIONNAIRE, HOW DIFFICULT HAVE THESE PROBLEMS MADE IT FOR YOU TO DO YOUR WORK, TAKE CARE OF THINGS AT HOME, OR GET ALONG WITH OTHER PEOPLE: SOMEWHAT DIFFICULT
3. WORRYING TOO MUCH ABOUT DIFFERENT THINGS: MORE THAN HALF THE DAYS

## 2023-10-10 ENCOUNTER — OFFICE VISIT (OUTPATIENT)
Dept: FAMILY MEDICINE | Facility: CLINIC | Age: 63
End: 2023-10-10
Payer: COMMERCIAL

## 2023-10-10 VITALS
HEART RATE: 70 BPM | SYSTOLIC BLOOD PRESSURE: 124 MMHG | TEMPERATURE: 97.1 F | HEIGHT: 69 IN | OXYGEN SATURATION: 96 % | DIASTOLIC BLOOD PRESSURE: 82 MMHG | WEIGHT: 190.8 LBS | BODY MASS INDEX: 28.26 KG/M2 | RESPIRATION RATE: 16 BRPM

## 2023-10-10 DIAGNOSIS — A04.8 H. PYLORI INFECTION: ICD-10-CM

## 2023-10-10 DIAGNOSIS — F32.1 MAJOR DEPRESSIVE DISORDER, SINGLE EPISODE, MODERATE (H): Primary | ICD-10-CM

## 2023-10-10 PROBLEM — F34.1 DYSTHYMIA: Status: RESOLVED | Noted: 2020-11-27 | Resolved: 2023-10-10

## 2023-10-10 PROCEDURE — 90715 TDAP VACCINE 7 YRS/> IM: CPT | Performed by: NURSE PRACTITIONER

## 2023-10-10 PROCEDURE — 90471 IMMUNIZATION ADMIN: CPT | Performed by: NURSE PRACTITIONER

## 2023-10-10 PROCEDURE — 99214 OFFICE O/P EST MOD 30 MIN: CPT | Mod: 25 | Performed by: NURSE PRACTITIONER

## 2023-10-10 RX ORDER — BUPROPION HYDROCHLORIDE 150 MG/1
150 TABLET ORAL EVERY MORNING
Qty: 90 TABLET | Refills: 0 | Status: SHIPPED | OUTPATIENT
Start: 2023-10-10 | End: 2024-01-03

## 2023-10-10 NOTE — PROGRESS NOTES
"  Assessment & Plan     Major depressive disorder, single episode, moderate (H)  Continue Zoloft, add Wellbutrin. Consider switching to Duloxetine if needed to see if she could also get benefit of chronic pain control.   - buPROPion (WELLBUTRIN XL) 150 MG 24 hr tablet; Take 1 tablet (150 mg) by mouth every morning    H. pylori infection  Needs recheck for eradication.  - Helicobacter pylori Antigen Stool; Future             BMI:   Estimated body mass index is 28.59 kg/m  as calculated from the following:    Height as of this encounter: 1.74 m (5' 8.5\").    Weight as of this encounter: 86.5 kg (190 lb 12.8 oz).       FUTURE APPOINTMENTS:       - Follow-up visit in 3-4 weeks if needed, I will message you on effectiveness of Wellbutrin in a few weeks.     OLEGARIO Bolaños CNP [facility]    Laura Woodruff is a 62 year old, presenting for the following health issues:  Depression        10/10/2023     8:04 AM   Additional Questions   Roomed by Yamilka VILLELA       History of Present Illness       Mental Health Follow-up:  Patient presents to follow-up on Depression & Anxiety.Patient's depression since last visit has been:  Worse  The patient is not having other symptoms associated with depression.  Patient's anxiety since last visit has been:  Worse  The patient is not having other symptoms associated with anxiety.  Any significant life events: health concerns  Patient is not feeling anxious or having panic attacks.  Patient has no concerns about alcohol or drug use.She consumes 2 sweetened beverage(s) daily.She exercises with enough effort to increase her heart rate 9 or less minutes per day.  She exercises with enough effort to increase her heart rate 3 or less days per week.   She is taking medications regularly.         Review of Systems   Constitutional, HEENT, cardiovascular, pulmonary, gi and gu systems are negative, except as otherwise noted.      Objective    /82   Pulse 70 " "  Temp 97.1  F (36.2  C) (Tympanic)   Resp 16   Ht 1.74 m (5' 8.5\")   Wt 86.5 kg (190 lb 12.8 oz)   SpO2 96%   BMI 28.59 kg/m    Body mass index is 28.59 kg/m .  Physical Exam   GENERAL: alert and no distress  NEURO: Normal strength and tone, mentation intact and speech normal  PSYCH: mentation appears normal, affect normal/bright                      "

## 2023-10-24 ENCOUNTER — ONCOLOGY VISIT (OUTPATIENT)
Dept: ONCOLOGY | Facility: HOSPITAL | Age: 63
End: 2023-10-24
Attending: INTERNAL MEDICINE
Payer: COMMERCIAL

## 2023-10-24 ENCOUNTER — LAB (OUTPATIENT)
Dept: INFUSION THERAPY | Facility: HOSPITAL | Age: 63
End: 2023-10-24
Attending: INTERNAL MEDICINE
Payer: COMMERCIAL

## 2023-10-24 VITALS
SYSTOLIC BLOOD PRESSURE: 121 MMHG | RESPIRATION RATE: 20 BRPM | TEMPERATURE: 99 F | DIASTOLIC BLOOD PRESSURE: 75 MMHG | WEIGHT: 188.4 LBS | HEIGHT: 69 IN | BODY MASS INDEX: 27.91 KG/M2 | HEART RATE: 71 BPM | OXYGEN SATURATION: 96 %

## 2023-10-24 DIAGNOSIS — D53.9 MACROCYTIC ANEMIA: Primary | ICD-10-CM

## 2023-10-24 DIAGNOSIS — D53.9 MACROCYTIC ANEMIA: ICD-10-CM

## 2023-10-24 LAB
BASOPHILS # BLD AUTO: 0 10E3/UL (ref 0–0.2)
BASOPHILS NFR BLD AUTO: 0 %
EOSINOPHIL # BLD AUTO: 0.1 10E3/UL (ref 0–0.7)
EOSINOPHIL NFR BLD AUTO: 1 %
ERYTHROCYTE [DISTWIDTH] IN BLOOD BY AUTOMATED COUNT: 12.2 % (ref 10–15)
HCT VFR BLD AUTO: 35.8 % (ref 35–47)
HGB BLD-MCNC: 12.4 G/DL (ref 11.7–15.7)
IMM GRANULOCYTES # BLD: 0 10E3/UL
IMM GRANULOCYTES NFR BLD: 0 %
LYMPHOCYTES # BLD AUTO: 1.4 10E3/UL (ref 0.8–5.3)
LYMPHOCYTES NFR BLD AUTO: 29 %
MCH RBC QN AUTO: 35.1 PG (ref 26.5–33)
MCHC RBC AUTO-ENTMCNC: 34.6 G/DL (ref 31.5–36.5)
MCV RBC AUTO: 101 FL (ref 78–100)
MONOCYTES # BLD AUTO: 0.4 10E3/UL (ref 0–1.3)
MONOCYTES NFR BLD AUTO: 9 %
NEUTROPHILS # BLD AUTO: 2.8 10E3/UL (ref 1.6–8.3)
NEUTROPHILS NFR BLD AUTO: 61 %
NRBC # BLD AUTO: 0 10E3/UL
NRBC BLD AUTO-RTO: 0 /100
PLATELET # BLD AUTO: 212 10E3/UL (ref 150–450)
RBC # BLD AUTO: 3.53 10E6/UL (ref 3.8–5.2)
WBC # BLD AUTO: 4.7 10E3/UL (ref 4–11)

## 2023-10-24 PROCEDURE — 99213 OFFICE O/P EST LOW 20 MIN: CPT | Performed by: INTERNAL MEDICINE

## 2023-10-24 PROCEDURE — 99214 OFFICE O/P EST MOD 30 MIN: CPT | Performed by: INTERNAL MEDICINE

## 2023-10-24 PROCEDURE — 36415 COLL VENOUS BLD VENIPUNCTURE: CPT

## 2023-10-24 PROCEDURE — 85004 AUTOMATED DIFF WBC COUNT: CPT

## 2023-10-24 ASSESSMENT — PAIN SCALES - GENERAL: PAINLEVEL: NO PAIN (0)

## 2023-10-24 NOTE — LETTER
"    10/24/2023         RE: Alissa Crawley  [address] [address] [place] [id number]        Dear Colleague,    Thank you for referring your patient, Alissa Crawley, to the [facility] [place]. Please see a copy of my visit note below.    Oncology Rooming Note    October 24, 2023 1:29 PM   Alissa Crawley is a 62 year old female who presents for:    Chief Complaint   Patient presents with     Hematology     3 month return Macrocytic anemia, review labs.     Initial Vitals: /75 (BP Location: Right arm, Patient Position: Sitting, Cuff Size: Adult Regular)   Pulse 71   Temp 99  F (37.2  C) (Oral)   Resp 20   Ht 1.74 m (5' 8.5\")   Wt 85.5 kg (188 lb 6.4 oz)   SpO2 96%   BMI 28.23 kg/m   Estimated body mass index is 28.23 kg/m  as calculated from the following:    Height as of this encounter: 1.74 m (5' 8.5\").    Weight as of this encounter: 85.5 kg (188 lb 6.4 oz). Body surface area is 2.03 meters squared.  No Pain (0) Comment: Data Unavailable   No LMP recorded. Patient has had a hysterectomy.  Allergies reviewed: Yes  Medications reviewed: Yes    Medications: Medication refills not needed today.  Pharmacy name entered into [employer]: [facility] - [facility] [id number] [facility]    Clinical concerns:  3 month return Macrocytic anemia, review labs.       Yessy Jang, [facility] Hematology and Oncology Consult Note    Patient: Alissa Crawley  MRN: [id number]  Date of Service: Oct 24, 2023       Reason for Visit    I was consulted by Melly Roach for anemia    Assessment/Plan    Macrocytic anemia  Bone marrow shows hypocellular marrow with trilineage hematopoiesis, unremarkable flow cytometry, low normal iron studies  No flit 3 ITD/TKD mutations  Chromosome studies normal  DNMT3A  C.1474+1_1474+2insGGGTGCGGCAGAAGTGCCGGAACATTGAGGG.  Mutation is detected  H. pylori  Symptoms of fatigue, hair thinning and sleepiness  Back " pain    CBC today shows normal hemoglobin.  Does not appear that the patient has a significant bone marrow process given normal cytogenetics.  Unclear as to why her hemoglobin was low earlier in the year.  At this point no further work-up is recommended.    We will have patient follow-up with primary and recommend at least annual CBC monitoring.  Referral back to hematology if there is a persistent drop in the hemoglobin less than 11 g/dL.    Questions answered.    Plan: No further follow-up in hematology  Annual check CBC with primary      Measurable disease: CBC, bone marrow    Current therapy: Observation        ECOG Performance    0 - Independent    Encounter Diagnoses:    Problem List Items Addressed This Visit          Hematologic    Macrocytic anemia - Primary    Relevant Orders    Check Out Appointment Request           ______________________________________________________________________________    Staging History   Cancer Staging   No matching staging information was found for the patient.        History    Alissa is here for follow-up.  Seen 3 months ago.  No new symptoms or problems.  ECOG status 1.    March 30, 2023:    Ms. Alissa Crawley is a 62 year old with past history of hypertension and depression who is referred for evaluation of anemia.  Patient with recent symptoms of increased fatigue, hair thinning, daytime sleepiness and back pain.  Has had extensive lab work-up showing progressive mild macrocytic anemia and so was referred for further evaluation.  Did have recent endoscopy and colonoscopy with findings of H. pylori and currently is on antibiotics to treat the H. pylori.    Has had some issues with lower back pain going into the left buttock and also right leg pain from the knee below.  Appetite has been decreased very recently due to the antibiotic therapy for H. pylori but otherwise she tends to be in overeater.  25 pound weight loss over the last 6 months which is attributed to  stress and having to take care of her grandkids.  Other symptoms as above.    Remote history of low iron 25 to 30 years ago treated with iron pills and a blood transfusion but improved after hysterectomy from 6 g to 13 g.  Diagnosed with B12 deficiency about 5 years ago.  Treated with pills with good response.  Recently had a 3-month trial of parenteral B12 without improvement in any of her symptoms.    Denies any bleeding symptoms.  No blood in the urine.  No change in diet.  No abdominal surgery in the stomach, intestines or colon.  Has had cholecystectomy and hysterectomy and right knee surgery in the past.    No other hospitalizations.  She lives in [place] and is  with 2 kids.  Works as a .  Does not smoke and drinks only occasionally.    No personal history of cancer.  No family history of anemia.  Father had prostate cancer in his 60s.  No other cancer in the family.                Review of systems.  No fever or night sweats.  No loss of weight.  No lumps or bumps anywhere.  No unusual headaches or eyesight issues.  No dizziness.  No bleeding from the nose.  No sores in the mouth. No problems with swallowing.  No chest pain. No shortness of breath. No cough.  No abdominal pain. No nausea or vomiting.  No diarrhea or constipation.  No blood in stool or black colored stools.  No problems passing urine.  No numbness or tingling in hands or feet.  No skin rashes.  A 14 point review of systems is otherwise negative.        Past History    Past Medical History:   Diagnosis Date     Chronic laryngitis 11/27/2020     Dysthymia 11/27/2020     Episodic cluster headache 11/27/2020     Gastroesophageal reflux disease 11/27/2020     Past Surgical History:   Procedure Laterality Date     CHOLECYSTECTOMY       COLONOSCOPY N/A 3/3/2023    Procedure: COLONOSCOPY, FLEXIBLE, WITH LESION REMOVAL USING SNARE;  Surgeon: Nader Gates DO;  Location: [facility]     COLONOSCOPY N/A 3/3/2023     "Procedure: COLONOSCOPY, WITH POLYPECTOMY AND BIOPSY;  Surgeon: Nader Gates DO;  Location: [facility]     ESOPHAGOSCOPY, GASTROSCOPY, DUODENOSCOPY (EGD), COMBINED N/A 3/3/2023    Procedure: ESOPHAGOGASTRODUODENOSCOPY, WITH BIOPSY;  Surgeon: Nader Gates DO;  Location: [facility]     HYSTERECTOMY, PAP NO LONGER INDICATED       OPEN REDUCTION INTERNAL FIXATION FIBULA Right 11/28/2020    Procedure: OPEN REDUCTION INTERNAL FIXATION, FRACTURE, FIBULA;  Surgeon: Jose Dawkins MD;  Location: [facility];  Service: Orthopedics     [facility] TREAT TIBIAL SHAFT FX, INTRAMED IMPLANT Right 11/28/2020    Procedure: OPEN REDUCTION INTERNAL FIXATION, FRACTURE, TIBIA, USING INTRAMEDULLARY HAYLIE;  Surgeon: Jose Dawkins MD;  Location: [facility];  Service: Orthopedics     Family History   Problem Relation Age of Onset     Arthritis Mother      Heart Disease Mother      Cancer Father         prostate     Prostate Cancer Father      Anesthesia Reaction Sister         Difficulty awakening     Clotting Disorder No family hx of      Social History     Socioeconomic History     Marital status:      Spouse name: None     Number of children: None     Years of education: None     Highest education level: None   Tobacco Use     Smoking status: Never     Smokeless tobacco: Never   Vaping Use     Vaping Use: Never used   Substance and Sexual Activity     Alcohol use: Yes     Comment: Alcoholic Drinks/day: \"A drink or two on the weekend\"     Drug use: Never     Sexual activity: Yes     Partners: Male         Allergies    No Known Allergies        Physical Exam    /75 (BP Location: Right arm, Patient Position: Sitting, Cuff Size: Adult Regular)   Pulse 71   Temp 99  F (37.2  C) (Oral)   Resp 20   Ht 1.74 m (5' 8.5\")   Wt 85.5 kg (188 lb 6.4 oz)   SpO2 96%   BMI 28.23 kg/m        GENERAL: Alert and oriented to time place and person. Seated comfortably. In no distress.    HEAD: Atraumatic and " normocephalic.    EYES: RYANN, EOMI.  No pallor.  No icterus.    Oral cavity: no mucosal lesion or tonsillar enlargement.    NECK: supple. JVP normal.  No thyroid enlargement.    LYMPH NODES: No palpable, cervical, axillary or inguinal lymphadenopathy.    CHEST: clear to auscultation bilaterally.  Resonant to percussion throughout bilaterally.  Symmetrical breath movements bilaterally.    CVS: S1 and S2 are heard. Regular rate and rhythm.  No murmur or gallop or rub heard.  No peripheral edema.    ABDOMEN: Soft. Not tender. Not distended.  No palpable hepatomegaly or splenomegaly.  No other mass palpable.  Bowel sounds heard.    EXTREMITIES: Warm.    SKIN: no rash, or bruising or purpura.  Has a full head of hair.    Lab Results    CBC 7 months ago [place] 4.6 hemoglobin 11.7 MCV 99 platelets 258  Recent CBC white count 3.7 hemoglobin 10.3 platelets 235   Normal B12 and folate and thyroid tests.  Normal CRP and sed rate.  Iron saturation 15% and ferritin of 14    Imaging Results    No results found.        Signed by: Jeremy Ribeiro MD      Again, thank you for allowing me to participate in the care of your patient.        Sincerely,        Jeremy Ribeiro MD

## 2023-10-24 NOTE — PROGRESS NOTES
[facility] Hematology and Oncology Consult Note    Patient: Alissa Crawley  MRN: [id number]  Date of Service: Oct 24, 2023       Reason for Visit    I was consulted by Melly Roach for anemia    Assessment/Plan    Macrocytic anemia  Bone marrow shows hypocellular marrow with trilineage hematopoiesis, unremarkable flow cytometry, low normal iron studies  No flit 3 ITD/TKD mutations  Chromosome studies normal  DNMT3A  C.1474+1_1474+2insGGGTGCGGCAGAAGTGCCGGAACATTGAGGG.  Mutation is detected  H. pylori  Symptoms of fatigue, hair thinning and sleepiness  Back pain    CBC today shows normal hemoglobin.  Does not appear that the patient has a significant bone marrow process given normal cytogenetics.  Unclear as to why her hemoglobin was low earlier in the year.  At this point no further work-up is recommended.    We will have patient follow-up with primary and recommend at least annual CBC monitoring.  Referral back to hematology if there is a persistent drop in the hemoglobin less than 11 g/dL.    Questions answered.    Plan: No further follow-up in hematology  Annual check CBC with primary      Measurable disease: CBC, bone marrow    Current therapy: Observation        ECOG Performance    0 - Independent    Encounter Diagnoses:    Problem List Items Addressed This Visit          Hematologic    Macrocytic anemia - Primary    Relevant Orders    Check Out Appointment Request           ______________________________________________________________________________    Staging History   Cancer Staging   No matching staging information was found for the patient.        History    Alissa is here for follow-up.  Seen 3 months ago.  No new symptoms or problems.  ECOG status 1.    March 30, 2023:    Ms. Alissa Crawley is a 62 year old with past history of hypertension and depression who is referred for evaluation of anemia.  Patient with recent symptoms of increased fatigue, hair thinning, daytime sleepiness and  back pain.  Has had extensive lab work-up showing progressive mild macrocytic anemia and so was referred for further evaluation.  Did have recent endoscopy and colonoscopy with findings of H. pylori and currently is on antibiotics to treat the H. pylori.    Has had some issues with lower back pain going into the left buttock and also right leg pain from the knee below.  Appetite has been decreased very recently due to the antibiotic therapy for H. pylori but otherwise she tends to be in overeater.  25 pound weight loss over the last 6 months which is attributed to stress and having to take care of her grandkids.  Other symptoms as above.    Remote history of low iron 25 to 30 years ago treated with iron pills and a blood transfusion but improved after hysterectomy from 6 g to 13 g.  Diagnosed with B12 deficiency about 5 years ago.  Treated with pills with good response.  Recently had a 3-month trial of parenteral B12 without improvement in any of her symptoms.    Denies any bleeding symptoms.  No blood in the urine.  No change in diet.  No abdominal surgery in the stomach, intestines or colon.  Has had cholecystectomy and hysterectomy and right knee surgery in the past.    No other hospitalizations.  She lives in [place] and is  with 2 kids.  Works as a .  Does not smoke and drinks only occasionally.    No personal history of cancer.  No family history of anemia.  Father had prostate cancer in his 60s.  No other cancer in the family.                Review of systems.  No fever or night sweats.  No loss of weight.  No lumps or bumps anywhere.  No unusual headaches or eyesight issues.  No dizziness.  No bleeding from the nose.  No sores in the mouth. No problems with swallowing.  No chest pain. No shortness of breath. No cough.  No abdominal pain. No nausea or vomiting.  No diarrhea or constipation.  No blood in stool or black colored stools.  No problems passing urine.  No numbness or tingling  in hands or feet.  No skin rashes.  A 14 point review of systems is otherwise negative.        Past History    Past Medical History:   Diagnosis Date    Chronic laryngitis 11/27/2020    Dysthymia 11/27/2020    Episodic cluster headache 11/27/2020    Gastroesophageal reflux disease 11/27/2020     Past Surgical History:   Procedure Laterality Date    CHOLECYSTECTOMY      COLONOSCOPY N/A 3/3/2023    Procedure: COLONOSCOPY, FLEXIBLE, WITH LESION REMOVAL USING SNARE;  Surgeon: Nader Gates DO;  Location: [facility]    COLONOSCOPY N/A 3/3/2023    Procedure: COLONOSCOPY, WITH POLYPECTOMY AND BIOPSY;  Surgeon: Nader Gates DO;  Location: [facility]    ESOPHAGOSCOPY, GASTROSCOPY, DUODENOSCOPY (EGD), COMBINED N/A 3/3/2023    Procedure: ESOPHAGOGASTRODUODENOSCOPY, WITH BIOPSY;  Surgeon: Nader Gates DO;  Location: [facility]    HYSTERECTOMY, PAP NO LONGER INDICATED      OPEN REDUCTION INTERNAL FIXATION FIBULA Right 11/28/2020    Procedure: OPEN REDUCTION INTERNAL FIXATION, FRACTURE, FIBULA;  Surgeon: Jose Dawkins MD;  Location: [facility];  Service: Orthopedics    [facility] TREAT TIBIAL SHAFT FX, INTRAMED IMPLANT Right 11/28/2020    Procedure: OPEN REDUCTION INTERNAL FIXATION, FRACTURE, TIBIA, USING INTRAMEDULLARY HAYLIE;  Surgeon: Jose Dawkins MD;  Location: [facility];  Service: Orthopedics     Family History   Problem Relation Age of Onset    Arthritis Mother     Heart Disease Mother     Cancer Father         prostate    Prostate Cancer Father     Anesthesia Reaction Sister         Difficulty awakening    Clotting Disorder No family hx of      Social History     Socioeconomic History    Marital status:      Spouse name: None    Number of children: None    Years of education: None    Highest education level: None   Tobacco Use    Smoking status: Never    Smokeless tobacco: Never   Vaping Use    Vaping Use: Never used   Substance and Sexual Activity    Alcohol use: Yes     Comment:  "Alcoholic Drinks/day: \"A drink or two on the weekend\"    Drug use: Never    Sexual activity: Yes     Partners: Male         Allergies    No Known Allergies        Physical Exam    /75 (BP Location: Right arm, Patient Position: Sitting, Cuff Size: Adult Regular)   Pulse 71   Temp 99  F (37.2  C) (Oral)   Resp 20   Ht 1.74 m (5' 8.5\")   Wt 85.5 kg (188 lb 6.4 oz)   SpO2 96%   BMI 28.23 kg/m        GENERAL: Alert and oriented to time place and person. Seated comfortably. In no distress.    HEAD: Atraumatic and normocephalic.    EYES: RYANN, EOMI.  No pallor.  No icterus.    Oral cavity: no mucosal lesion or tonsillar enlargement.    NECK: supple. JVP normal.  No thyroid enlargement.    LYMPH NODES: No palpable, cervical, axillary or inguinal lymphadenopathy.    CHEST: clear to auscultation bilaterally.  Resonant to percussion throughout bilaterally.  Symmetrical breath movements bilaterally.    CVS: S1 and S2 are heard. Regular rate and rhythm.  No murmur or gallop or rub heard.  No peripheral edema.    ABDOMEN: Soft. Not tender. Not distended.  No palpable hepatomegaly or splenomegaly.  No other mass palpable.  Bowel sounds heard.    EXTREMITIES: Warm.    SKIN: no rash, or bruising or purpura.  Has a full head of hair.    Lab Results    CBC 7 months ago [place] 4.6 hemoglobin 11.7 MCV 99 platelets 258  Recent CBC white count 3.7 hemoglobin 10.3 platelets 235   Normal B12 and folate and thyroid tests.  Normal CRP and sed rate.  Iron saturation 15% and ferritin of 14    Imaging Results    No results found.        Signed by: Jeremy Ribeiro MD  "

## 2023-10-24 NOTE — PROGRESS NOTES
"Oncology Rooming Note    October 24, 2023 1:29 PM   Alissa Crawley is a 62 year old female who presents for:    Chief Complaint   Patient presents with    Hematology     3 month return Macrocytic anemia, review labs.     Initial Vitals: /75 (BP Location: Right arm, Patient Position: Sitting, Cuff Size: Adult Regular)   Pulse 71   Temp 99  F (37.2  C) (Oral)   Resp 20   Ht 1.74 m (5' 8.5\")   Wt 85.5 kg (188 lb 6.4 oz)   SpO2 96%   BMI 28.23 kg/m   Estimated body mass index is 28.23 kg/m  as calculated from the following:    Height as of this encounter: 1.74 m (5' 8.5\").    Weight as of this encounter: 85.5 kg (188 lb 6.4 oz). Body surface area is 2.03 meters squared.  No Pain (0) Comment: Data Unavailable   No LMP recorded. Patient has had a hysterectomy.  Allergies reviewed: Yes  Medications reviewed: Yes    Medications: Medication refills not needed today.  Pharmacy name entered into [employer]: [facility] - [facility] [id number] [facility]    Clinical concerns:  3 month return Macrocytic anemia, review labs.       Yessy Jang CMA            " DISPLAY PLAN FREE TEXT

## 2023-10-26 ENCOUNTER — MYC MEDICAL ADVICE (OUTPATIENT)
Dept: FAMILY MEDICINE | Facility: CLINIC | Age: 63
End: 2023-10-26
Payer: COMMERCIAL

## 2024-01-01 DIAGNOSIS — F32.1 MAJOR DEPRESSIVE DISORDER, SINGLE EPISODE, MODERATE (H): ICD-10-CM

## 2024-01-03 RX ORDER — BUPROPION HYDROCHLORIDE 150 MG/1
150 TABLET ORAL EVERY MORNING
Qty: 90 TABLET | Refills: 3 | Status: SHIPPED | OUTPATIENT
Start: 2024-01-03 | End: 2024-06-28

## 2024-01-05 ENCOUNTER — HOSPITAL ENCOUNTER (OUTPATIENT)
Dept: MAMMOGRAPHY | Facility: CLINIC | Age: 64
Discharge: HOME | End: 2024-01-05
Attending: NURSE PRACTITIONER | Admitting: NURSE PRACTITIONER
Payer: COMMERCIAL

## 2024-01-05 DIAGNOSIS — F32.1 MAJOR DEPRESSIVE DISORDER, SINGLE EPISODE, MODERATE (H): ICD-10-CM

## 2024-01-05 DIAGNOSIS — R92.8 CATEGORY 3 MAMMOGRAPHY RESULT WITH SHORT FOLLOW-UP INTERVAL SUGGESTED FOR PROBABLY BENIGN FINDING: ICD-10-CM

## 2024-01-05 PROCEDURE — 77061 BREAST TOMOSYNTHESIS UNI: CPT | Mod: RT

## 2024-01-05 RX ORDER — SERTRALINE HYDROCHLORIDE 100 MG/1
200 TABLET, FILM COATED ORAL DAILY
Qty: 180 TABLET | Refills: 3 | Status: SHIPPED | OUTPATIENT
Start: 2024-01-05

## 2024-01-05 NOTE — TELEPHONE ENCOUNTER
"Requested Prescriptions   Pending Prescriptions Disp Refills    sertraline (ZOLOFT) 100 MG tablet [Pharmacy Med Name: sertraline 100 mg tablet] 180 tablet 3     Sig: Take 2 tablets (200 mg) by mouth daily       SSRIs Protocol Failed - 1/5/2024  8:01 AM        Failed - PHQ-9 score less than 5 in past 6 months     Please review last PHQ-9 score.           Passed - Medication is active on med list        Passed - Patient is age 18 or older        Passed - No active pregnancy on record        Passed - No positive pregnancy test in last 12 months        Passed - Recent (6 mo) or future (30 days) visit within the authorizing provider's specialty     Patient had office visit in the last 6 months or has a visit in the next 30 days with authorizing provider or within the authorizing provider's specialty.  See \"Patient Info\" tab in inbasket, or \"Choose Columns\" in Meds & Orders section of the refill encounter.                 "

## 2024-01-31 DIAGNOSIS — G44.019 EPISODIC CLUSTER HEADACHE, NOT INTRACTABLE: ICD-10-CM

## 2024-02-01 RX ORDER — VERAPAMIL HYDROCHLORIDE 120 MG/1
120 CAPSULE, EXTENDED RELEASE ORAL
Qty: 24 CAPSULE | Refills: 3 | Status: SHIPPED | OUTPATIENT
Start: 2024-02-01

## 2024-02-05 ENCOUNTER — OFFICE VISIT (OUTPATIENT)
Dept: NEUROLOGY | Facility: CLINIC | Age: 64
End: 2024-02-05
Payer: COMMERCIAL

## 2024-02-05 VITALS — SYSTOLIC BLOOD PRESSURE: 126 MMHG | HEART RATE: 60 BPM | DIASTOLIC BLOOD PRESSURE: 81 MMHG

## 2024-02-05 DIAGNOSIS — G62.9 SENSORIMOTOR NEUROPATHY: Primary | ICD-10-CM

## 2024-02-05 DIAGNOSIS — R52 PAINFUL PARESTHESIA: ICD-10-CM

## 2024-02-05 DIAGNOSIS — M54.81 BILATERAL OCCIPITAL NEURALGIA: ICD-10-CM

## 2024-02-05 DIAGNOSIS — R20.2 PAINFUL PARESTHESIA: ICD-10-CM

## 2024-02-05 PROCEDURE — 99213 OFFICE O/P EST LOW 20 MIN: CPT | Performed by: PSYCHIATRY & NEUROLOGY

## 2024-02-05 RX ORDER — PREGABALIN 75 MG/1
75 CAPSULE ORAL AT BEDTIME
Qty: 30 CAPSULE | Refills: 3 | Status: SHIPPED | OUTPATIENT
Start: 2024-02-05 | End: 2024-06-28

## 2024-02-05 NOTE — LETTER
2/5/2024         RE: Alissa Crawley  [address] [address] [place] [id number]        Dear Colleague,    Thank you for referring your patient, Alissa Crawley, to the [facility] [place]. Please see a copy of my visit note below.    ESTABLISHED PATIENT NEUROLOGY NOTE    DATE OF VISIT: 2/5/2024  MRN: [id number]  PATIENT NAME: Alissa Crawley  YOB: 1960    Chief Complaint   Patient presents with     NEUROPATHY     Still having problem with the R leg; losing feelings in the 3 small toes      Headache     SUBJECTIVE:                                                      HISTORY OF PRESENT ILLNESS:  Alissa is here for follow up regarding sensorimotor neuropathy.  Previous brain MRI was normal.  She has problems with leg pain.  She has seen vascular and rheumatology as well is following with us.  EMG confirmed neuropathy.  Labs have been largely unremarkable except for mildly elevated GIOVANNY and ESR.  She also has symptoms of bilateral occipital neuralgia.  I asked her to follow-up with her rheumatologist and see her orthopedist for a new leg injury on the right and then follow-up with me in 6 months.  I also referred Kacy to pain clinic for ONB.  She had stopped her gabapentin due to lack of effectiveness for her discomfort.  Kacy says the Right leg continues to be painful. She irritates it with activity, such as walking the dog. She does have ankle pain, post-surgically. She describes some restlessness, cramping in her toes. Other pains are migratory.     She says if she takes something for sleep (over the counter gummies), this tends to trigger an a.m. headache although the headaches can happen outside of the setting as well.  She did not have the nerve blocks completed.  It seems that the main issue is pain related to her neck, and sometimes she has trouble getting comfortable when going to sleep.  Rheumatologic workup is completed, she also saw hematology and her labs  seemed to self normalize.    Ferritin was 14 about a year ago.    CURRENT MEDICATIONS:   acetaminophen (TYLENOL) 500 MG tablet, Take 500-1,000 mg by mouth daily as needed for mild pain  calcium carbonate (OS-JAZZY) 600 mg calcium (1,500 mg) tablet, [CALCIUM CARBONATE (OS-JAZZY) 600 MG CALCIUM (1,500 MG) TABLET] Take 600 mg by mouth daily.  cyanocobalamin 1000 MCG tablet, Take 1,000 mcg by mouth daily  esomeprazole (NEXIUM) 20 MG capsule, Take 20 mg by mouth daily before breakfast  sertraline (ZOLOFT) 100 MG tablet, Take 2 tablets (200 mg) by mouth daily  verapamil ER (VERELAN) 120 MG 24 hr capsule, Take 1 capsule (120 mg) by mouth twice a week Take 120 mg by mouth 2 (two) times a week. In the PM - on Weds and Sundays  buPROPion (WELLBUTRIN XL) 150 MG 24 hr tablet, Take 1 tablet (150 mg) by mouth every morning    BUPivacaine (MARCAINE) 0.25 % injection 4 mL  methylPREDNISolone (DEPO-Medrol) injection 80 mg        RECENT DIAGNOSTIC STUDIES:   Labs: No results found for any visits on 02/05/24.    REVIEW OF SYSTEMS:                                                      10-point review of systems is negative except as mentioned above in HPI.    EXAM:                                                      Physical Exam:   Vitals: /81   Pulse 60   BMI= There is no height or weight on file to calculate BMI.  GENERAL: NAD.  HEENT: NC/AT.  PULM: Non-labored breathing.   Focused Neurologic  Decreased vibratory sensation at the right ankle compared to the left, decreased pinprick from above the ankle and distally on the right.  No weakness detected.  Able to stand on each leg independently without difficulty.  LE Reflexes are intact and symmetric.    ASSESSMENT and PLAN:                                                      Assessment:    ICD-10-CM    1. Sensorimotor neuropathy  G62.9 pregabalin (LYRICA) 75 MG capsule      2. Painful paresthesia  R20.2     R52       3. Bilateral occipital neuralgia  M54.81           Ms. Crawley  is a pleasant 63-year-old woman here for follow-up regarding sensorimotor neuropathy and cervicogenic versus occipital neuralgia headaches.  The main issue is migratory pain, which is at least in part related to her neuropathy.  She tried gabapentin in the past without effectiveness so I suggested we do a trial of Lyrica, which could be helpful from a headache standpoint as well.  We will start with an evening dose and titrate from there depending on her response.  I would like to see her back in clinic again in about 6 months.  She expressed understanding and agreement with the plan.    Plan:  -- Take Lyrica (pregabalin): One 75mg capsule an hour before bedtime.  We can add additional daytime doses or adjust the strength according to your response so feel free to let me know.    -- Return to neurology clinic in 6 months.  Let me know if you need a new order for the occipital nerve blocks in the meantime.    Total Time: 25 minutes were spent with the patient and in chart review/documentation (as described above in Assessment and Plan)/coordinating the care on date of service.    Linda George MD  Neurology    Dragon software used in the dictation of this note.                    Again, thank you for allowing me to participate in the care of your patient.        Sincerely,        Linda George MD

## 2024-02-05 NOTE — NURSING NOTE
"Alissa Crawley is a 63 year old female who presents for:  Chief Complaint   Patient presents with    NEUROPATHY     Still having problem with the R leg; losing feelings in the 3 small toes     Headache        Initial Vitals:  /81   Pulse 60  Estimated body mass index is 28.23 kg/m  as calculated from the following:    Height as of 10/24/23: 1.74 m (5' 8.5\").    Weight as of 10/24/23: 85.5 kg (188 lb 6.4 oz).. There is no height or weight on file to calculate BSA. BP completed using cuff size: wrist cuff    Al Kern  "

## 2024-02-05 NOTE — PROGRESS NOTES
ESTABLISHED PATIENT NEUROLOGY NOTE    DATE OF VISIT: 2/5/2024  MRN: [id number]  PATIENT NAME: Alissa Crawley  YOB: 1960    Chief Complaint   Patient presents with    NEUROPATHY     Still having problem with the R leg; losing feelings in the 3 small toes     Headache     SUBJECTIVE:                                                      HISTORY OF PRESENT ILLNESS:  Alissa is here for follow up regarding sensorimotor neuropathy.  Previous brain MRI was normal.  She has problems with leg pain.  She has seen vascular and rheumatology as well is following with us.  EMG confirmed neuropathy.  Labs have been largely unremarkable except for mildly elevated GIOVANNY and ESR.  She also has symptoms of bilateral occipital neuralgia.  I asked her to follow-up with her rheumatologist and see her orthopedist for a new leg injury on the right and then follow-up with me in 6 months.  I also referred Kacy to pain clinic for ONB.  She had stopped her gabapentin due to lack of effectiveness for her discomfort.  Kacy says the Right leg continues to be painful. She irritates it with activity, such as walking the dog. She does have ankle pain, post-surgically. She describes some restlessness, cramping in her toes. Other pains are migratory.     She says if she takes something for sleep (over the counter gummies), this tends to trigger an a.m. headache although the headaches can happen outside of the setting as well.  She did not have the nerve blocks completed.  It seems that the main issue is pain related to her neck, and sometimes she has trouble getting comfortable when going to sleep.  Rheumatologic workup is completed, she also saw hematology and her labs seemed to self normalize.    Ferritin was 14 about a year ago.    CURRENT MEDICATIONS:   acetaminophen (TYLENOL) 500 MG tablet, Take 500-1,000 mg by mouth daily as needed for mild pain  calcium carbonate (OS-JAZZY) 600 mg calcium (1,500 mg) tablet, [CALCIUM CARBONATE  (OS-JAZZY) 600 MG CALCIUM (1,500 MG) TABLET] Take 600 mg by mouth daily.  cyanocobalamin 1000 MCG tablet, Take 1,000 mcg by mouth daily  esomeprazole (NEXIUM) 20 MG capsule, Take 20 mg by mouth daily before breakfast  sertraline (ZOLOFT) 100 MG tablet, Take 2 tablets (200 mg) by mouth daily  verapamil ER (VERELAN) 120 MG 24 hr capsule, Take 1 capsule (120 mg) by mouth twice a week Take 120 mg by mouth 2 (two) times a week. In the PM - on Weds and Sundays  buPROPion (WELLBUTRIN XL) 150 MG 24 hr tablet, Take 1 tablet (150 mg) by mouth every morning    BUPivacaine (MARCAINE) 0.25 % injection 4 mL  methylPREDNISolone (DEPO-Medrol) injection 80 mg        RECENT DIAGNOSTIC STUDIES:   Labs: No results found for any visits on 02/05/24.    REVIEW OF SYSTEMS:                                                      10-point review of systems is negative except as mentioned above in HPI.    EXAM:                                                      Physical Exam:   Vitals: /81   Pulse 60   BMI= There is no height or weight on file to calculate BMI.  GENERAL: NAD.  HEENT: NC/AT.  PULM: Non-labored breathing.   Focused Neurologic  Decreased vibratory sensation at the right ankle compared to the left, decreased pinprick from above the ankle and distally on the right.  No weakness detected.  Able to stand on each leg independently without difficulty.  LE Reflexes are intact and symmetric.    ASSESSMENT and PLAN:                                                      Assessment:    ICD-10-CM    1. Sensorimotor neuropathy  G62.9 pregabalin (LYRICA) 75 MG capsule      2. Painful paresthesia  R20.2     R52       3. Bilateral occipital neuralgia  M54.81           Ms. Crawley is a pleasant 63-year-old woman here for follow-up regarding sensorimotor neuropathy and cervicogenic versus occipital neuralgia headaches.  The main issue is migratory pain, which is at least in part related to her neuropathy.  She tried gabapentin in the past  without effectiveness so I suggested we do a trial of Lyrica, which could be helpful from a headache standpoint as well.  We will start with an evening dose and titrate from there depending on her response.  I would like to see her back in clinic again in about 6 months.  She expressed understanding and agreement with the plan.    Plan:  -- Take Lyrica (pregabalin): One 75mg capsule an hour before bedtime.  We can add additional daytime doses or adjust the strength according to your response so feel free to let me know.    -- Return to neurology clinic in 6 months.  Let me know if you need a new order for the occipital nerve blocks in the meantime.    Total Time: 25 minutes were spent with the patient and in chart review/documentation (as described above in Assessment and Plan)/coordinating the care on date of service.    Linda George MD  Neurology    Dragon software used in the dictation of this note.

## 2024-02-05 NOTE — PATIENT INSTRUCTIONS
Plan:  -- Take Lyrica (pregabalin): One 75mg capsule an hour before bedtime.  We can add additional daytime doses or adjust the strength according to your response so feel free to let me know.    -- Return to neurology clinic in 6 months.  Let me know if you need a new order for the occipital nerve blocks in the meantime.

## 2024-03-05 ENCOUNTER — PATIENT OUTREACH (OUTPATIENT)
Dept: ONCOLOGY | Facility: HOSPITAL | Age: 64
End: 2024-03-05
Payer: COMMERCIAL

## 2024-06-25 SDOH — HEALTH STABILITY: PHYSICAL HEALTH: ON AVERAGE, HOW MANY MINUTES DO YOU ENGAGE IN EXERCISE AT THIS LEVEL?: 30 MIN

## 2024-06-25 SDOH — HEALTH STABILITY: PHYSICAL HEALTH: ON AVERAGE, HOW MANY DAYS PER WEEK DO YOU ENGAGE IN MODERATE TO STRENUOUS EXERCISE (LIKE A BRISK WALK)?: 1 DAY

## 2024-06-25 ASSESSMENT — SOCIAL DETERMINANTS OF HEALTH (SDOH): HOW OFTEN DO YOU GET TOGETHER WITH FRIENDS OR RELATIVES?: ONCE A WEEK

## 2024-06-27 ASSESSMENT — PATIENT HEALTH QUESTIONNAIRE - PHQ9
SUM OF ALL RESPONSES TO PHQ QUESTIONS 1-9: 8
SUM OF ALL RESPONSES TO PHQ QUESTIONS 1-9: 8
10. IF YOU CHECKED OFF ANY PROBLEMS, HOW DIFFICULT HAVE THESE PROBLEMS MADE IT FOR YOU TO DO YOUR WORK, TAKE CARE OF THINGS AT HOME, OR GET ALONG WITH OTHER PEOPLE: SOMEWHAT DIFFICULT

## 2024-06-28 ENCOUNTER — OFFICE VISIT (OUTPATIENT)
Dept: FAMILY MEDICINE | Facility: CLINIC | Age: 64
End: 2024-06-28
Payer: COMMERCIAL

## 2024-06-28 ENCOUNTER — ANCILLARY PROCEDURE (OUTPATIENT)
Dept: GENERAL RADIOLOGY | Facility: CLINIC | Age: 64
End: 2024-06-28
Attending: NURSE PRACTITIONER
Payer: COMMERCIAL

## 2024-06-28 VITALS
HEART RATE: 79 BPM | OXYGEN SATURATION: 98 % | WEIGHT: 198.6 LBS | SYSTOLIC BLOOD PRESSURE: 122 MMHG | BODY MASS INDEX: 29.41 KG/M2 | HEIGHT: 69 IN | TEMPERATURE: 97.7 F | DIASTOLIC BLOOD PRESSURE: 80 MMHG | RESPIRATION RATE: 16 BRPM

## 2024-06-28 DIAGNOSIS — L65.9 HAIR LOSS: ICD-10-CM

## 2024-06-28 DIAGNOSIS — M25.571 PAIN IN JOINT, ANKLE AND FOOT, RIGHT: ICD-10-CM

## 2024-06-28 DIAGNOSIS — N95.2 ATROPHIC VAGINITIS: ICD-10-CM

## 2024-06-28 DIAGNOSIS — Z00.00 ROUTINE PHYSICAL EXAMINATION: Primary | ICD-10-CM

## 2024-06-28 DIAGNOSIS — D18.01 ANGIOMA OF SKIN: ICD-10-CM

## 2024-06-28 DIAGNOSIS — R52 PAINFUL PARESTHESIA: ICD-10-CM

## 2024-06-28 DIAGNOSIS — E53.8 VITAMIN B 12 DEFICIENCY: ICD-10-CM

## 2024-06-28 DIAGNOSIS — R20.2 PAINFUL PARESTHESIA: ICD-10-CM

## 2024-06-28 DIAGNOSIS — Z13.6 CARDIOVASCULAR SCREENING; LDL GOAL LESS THAN 130: ICD-10-CM

## 2024-06-28 PROBLEM — S82.202P TIBIA/FIBULA FRACTURE, LEFT, CLOSED, WITH MALUNION, SUBSEQUENT ENCOUNTER: Status: RESOLVED | Noted: 2020-11-28 | Resolved: 2024-06-28

## 2024-06-28 PROBLEM — S82.401S: Status: RESOLVED | Noted: 2020-11-27 | Resolved: 2024-06-28

## 2024-06-28 PROBLEM — S82.201G: Status: RESOLVED | Noted: 2020-11-27 | Resolved: 2024-06-28

## 2024-06-28 PROBLEM — S82.401A TIBIA/FIBULA FRACTURE, RIGHT, CLOSED, INITIAL ENCOUNTER: Status: RESOLVED | Noted: 2020-11-27 | Resolved: 2024-06-28

## 2024-06-28 PROBLEM — S82.201A TIBIA/FIBULA FRACTURE, SHAFT, RIGHT, CLOSED, INITIAL ENCOUNTER: Status: RESOLVED | Noted: 2020-11-27 | Resolved: 2024-06-28

## 2024-06-28 PROBLEM — A04.8 H. PYLORI INFECTION: Status: RESOLVED | Noted: 2023-03-16 | Resolved: 2024-06-28

## 2024-06-28 PROBLEM — S82.401G: Status: RESOLVED | Noted: 2020-11-27 | Resolved: 2024-06-28

## 2024-06-28 PROBLEM — S82.201A TIBIA/FIBULA FRACTURE, RIGHT, CLOSED, INITIAL ENCOUNTER: Status: RESOLVED | Noted: 2020-11-27 | Resolved: 2024-06-28

## 2024-06-28 PROBLEM — S82.401A TIBIA/FIBULA FRACTURE, SHAFT, RIGHT, CLOSED, INITIAL ENCOUNTER: Status: RESOLVED | Noted: 2020-11-27 | Resolved: 2024-06-28

## 2024-06-28 PROBLEM — S82.402P TIBIA/FIBULA FRACTURE, LEFT, CLOSED, WITH MALUNION, SUBSEQUENT ENCOUNTER: Status: RESOLVED | Noted: 2020-11-28 | Resolved: 2024-06-28

## 2024-06-28 PROBLEM — S82.201S: Status: RESOLVED | Noted: 2020-11-27 | Resolved: 2024-06-28

## 2024-06-28 LAB
ANION GAP SERPL CALCULATED.3IONS-SCNC: 12 MMOL/L (ref 7–15)
BUN SERPL-MCNC: 16.7 MG/DL (ref 8–23)
CALCIUM SERPL-MCNC: 9.4 MG/DL (ref 8.8–10.2)
CHLORIDE SERPL-SCNC: 109 MMOL/L (ref 98–107)
CHOLEST SERPL-MCNC: 199 MG/DL
CREAT SERPL-MCNC: 0.94 MG/DL (ref 0.51–0.95)
DEPRECATED HCO3 PLAS-SCNC: 23 MMOL/L (ref 22–29)
EGFRCR SERPLBLD CKD-EPI 2021: 69 ML/MIN/1.73M2
ERYTHROCYTE [DISTWIDTH] IN BLOOD BY AUTOMATED COUNT: 12.1 % (ref 10–15)
FASTING STATUS PATIENT QL REPORTED: NO
FASTING STATUS PATIENT QL REPORTED: NO
FERRITIN SERPL-MCNC: 46 NG/ML (ref 11–328)
GLUCOSE SERPL-MCNC: 64 MG/DL (ref 70–99)
HCT VFR BLD AUTO: 35.3 % (ref 35–47)
HDLC SERPL-MCNC: 42 MG/DL
HGB BLD-MCNC: 12 G/DL (ref 11.7–15.7)
LDLC SERPL CALC-MCNC: 132 MG/DL
MCH RBC QN AUTO: 35 PG (ref 26.5–33)
MCHC RBC AUTO-ENTMCNC: 34 G/DL (ref 31.5–36.5)
MCV RBC AUTO: 103 FL (ref 78–100)
NONHDLC SERPL-MCNC: 157 MG/DL
PLATELET # BLD AUTO: 208 10E3/UL (ref 150–450)
POTASSIUM SERPL-SCNC: 4.5 MMOL/L (ref 3.4–5.3)
RBC # BLD AUTO: 3.43 10E6/UL (ref 3.8–5.2)
SODIUM SERPL-SCNC: 144 MMOL/L (ref 135–145)
TRIGL SERPL-MCNC: 125 MG/DL
VIT B12 SERPL-MCNC: 825 PG/ML (ref 232–1245)
WBC # BLD AUTO: 4.5 10E3/UL (ref 4–11)

## 2024-06-28 PROCEDURE — 82607 VITAMIN B-12: CPT | Performed by: NURSE PRACTITIONER

## 2024-06-28 PROCEDURE — 36415 COLL VENOUS BLD VENIPUNCTURE: CPT | Performed by: NURSE PRACTITIONER

## 2024-06-28 PROCEDURE — 85027 COMPLETE CBC AUTOMATED: CPT | Performed by: NURSE PRACTITIONER

## 2024-06-28 PROCEDURE — 73610 X-RAY EXAM OF ANKLE: CPT | Mod: TC | Performed by: RADIOLOGY

## 2024-06-28 PROCEDURE — 80061 LIPID PANEL: CPT | Performed by: NURSE PRACTITIONER

## 2024-06-28 PROCEDURE — 99214 OFFICE O/P EST MOD 30 MIN: CPT | Mod: 25 | Performed by: NURSE PRACTITIONER

## 2024-06-28 PROCEDURE — 82728 ASSAY OF FERRITIN: CPT | Performed by: NURSE PRACTITIONER

## 2024-06-28 PROCEDURE — 80048 BASIC METABOLIC PNL TOTAL CA: CPT | Performed by: NURSE PRACTITIONER

## 2024-06-28 PROCEDURE — 99396 PREV VISIT EST AGE 40-64: CPT | Performed by: NURSE PRACTITIONER

## 2024-06-28 RX ORDER — ESTRADIOL 1 MG/1
1 TABLET ORAL DAILY
Qty: 90 TABLET | Refills: 3 | Status: SHIPPED | OUTPATIENT
Start: 2024-06-28

## 2024-06-28 NOTE — RESULT ENCOUNTER NOTE
Hi Kacy-     No new findings in the ankle. Please let us know if you have any questions.    Take care,    OLEGARIO Eubanks CNP

## 2024-06-28 NOTE — PROGRESS NOTES
"Preventive Care Visit  [facility]  OLEGARIO Bolaños CNP, Family Medicine  Jun 28, 2024      Assessment & Plan     Routine physical examination    - Basic metabolic panel  (Ca, Cl, CO2, Creat, Gluc, K, Na, BUN); Future  - Basic metabolic panel  (Ca, Cl, CO2, Creat, Gluc, K, Na, BUN)    Pain in joint, ankle and foot, right  Will schedule with ortho for new or worsening sx  - XR Ankle Right G/E 3 Views; Future    Hair loss    - Adult Dermatology  Referral; Future    Atrophic vaginitis  Trial;  - estradiol (ESTRACE) 1 MG tablet; Take 1 tablet (1 mg) by mouth daily    Vitamin B 12 deficiency  On supplement  - CBC with platelets; Future  - Vitamin B12; Future  - Vitamin B12  - CBC with platelets    Angioma of skin  Continue observation    Painful paresthesia  Followed by Neurology  - Ferritin    CARDIOVASCULAR SCREENING; LDL GOAL LESS THAN 130    - Lipid panel reflex to direct LDL Non-fasting; Future  - Lipid panel reflex to direct LDL Non-fasting      BMI  Estimated body mass index is 29.58 kg/m  as calculated from the following:    Height as of this encounter: 1.745 m (5' 8.7\").    Weight as of this encounter: 90.1 kg (198 lb 9.6 oz).   Weight management plan: Discussed healthy diet and exercise guidelines    Counseling  Appropriate preventive services were discussed with this patient, including applicable screening as appropriate for fall prevention, nutrition, physical activity, Tobacco-use cessation, weight loss and cognition.  Checklist reviewing preventive services available has been given to the patient.  Reviewed patient's diet, addressing concerns and/or questions.   She is at risk for lack of exercise and has been provided with information to increase physical activity for the benefit of her well-being.   She is at risk for psychosocial distress and has been provided with information to reduce risk.   The patient's PHQ-9 score is consistent with mild depression. She " was provided with information regarding depression.       CONSULTATION/REFERRAL to DERM    FUTURE APPOINTMENTS:       - Follow-up for annual visit or as needed    See Patient Instructions    Laura Woodruff is a 63 year old, presenting for the following:  Physical        6/28/2024     7:59 AM   Additional Questions   Roomed by Yamilka VILLELA        Health Care Directive  Patient does not have a Health Care Directive or Living Will: Discussed advance care planning with patient; however, patient declined at this time.    HPI  Other concerns-estrogen levels- vaginal pain and dryness, temp intolerance, hair loss. mole on her buttocks that has been changing in size and color- sometimes looks blue. right ankle pain- chronic, hx of fracture a few years ago, hardware is removed.         6/25/2024   General Health   How would you rate your overall physical health? (!) FAIR   Feel stress (tense, anxious, or unable to sleep) Only a little      (!) STRESS CONCERN      6/25/2024   Nutrition   Three or more servings of calcium each day? (!) NO   Diet: Regular (no restrictions)   How many servings of fruit and vegetables per day? (!) 0-1   How many sweetened beverages each day? (!) 2            6/25/2024   Exercise   Days per week of moderate/strenous exercise 1 day   Average minutes spent exercising at this level 30 min      (!) EXERCISE CONCERN      6/25/2024   Social Factors   Frequency of gathering with friends or relatives Once a week   Worry food won't last until get money to buy more No   Food not last or not have enough money for food? No   Do you have housing? (Housing is defined as stable permanent housing and does not include staying ouside in a car, in a tent, in an abandoned building, in an overnight shelter, or couch-surfing.) Yes   Are you worried about losing your housing? No   Lack of transportation? No   Unable to get utilities (heat,electricity)? No            6/28/2024   Fall Risk   Gait Speed Test (Document in  "seconds) 3.5   Gait Speed Test Interpretation Less than or equal to 5.00 seconds - PASS             6/25/2024   Dental   Dentist two times every year? Yes            6/25/2024   TB Screening   Were you born outside of the [place]? Yes          Today's PHQ-9 Score:       6/27/2024    10:15 AM   PHQ-9 SCORE   PHQ-9 Total Score MyChart 8 (Mild depression)   PHQ-9 Total Score 8         6/25/2024   Substance Use   Alcohol more than 3/day or more than 7/wk No   Do you use any other substances recreationally? No        Social History     Tobacco Use    Smoking status: Never     Passive exposure: Current (Father smoked a cigar. Currently lives with 2 sons that both smoke.)    Smokeless tobacco: Never   Vaping Use    Vaping status: Never Used   Substance Use Topics    Alcohol use: Yes     Comment: Alcoholic Drinks/day: \"A drink or two on the weekend\"    Drug use: Never           1/5/2024   LAST FHS-7 RESULTS   1st degree relative breast or ovarian cancer No   Any relative bilateral breast cancer No   Any male have breast cancer No   Any ONE woman have BOTH breast AND ovarian cancer No   Any woman with breast cancer before 50yrs No   2 or more relatives with breast AND/OR ovarian cancer No   2 or more relatives with breast AND/OR bowel cancer No           Mammogram Screening - Mammogram every 1-2 years updated in Health Maintenance based on mutual decision making          6/25/2024   One time HIV Screening   Previous HIV test? I don't know          6/25/2024   STI Screening   New sexual partner(s) since last STI/HIV test? No        History of abnormal Pap smear: Status post hysterectomy with removal of cervix and no history of CIN2 or greater or cervical cancer. Health Maintenance and Surgical History updated.       ASCVD Risk   The 10-year ASCVD risk score (Varinder DOE, et al., 2019) is: 3.3%    Values used to calculate the score:      Age: 63 years      Sex: Female      Is Non- : No      Diabetic: " "No      Tobacco smoker: No      Systolic Blood Pressure: 122 mmHg      Is BP treated: No      HDL Cholesterol: 49 mg/dL      Total Cholesterol: 177 mg/dL           Reviewed and updated as needed this visit by Provider                    BP Readings from Last 3 Encounters:   06/28/24 122/80   02/05/24 126/81   10/24/23 121/75    Wt Readings from Last 3 Encounters:   06/28/24 90.1 kg (198 lb 9.6 oz)   10/24/23 85.5 kg (188 lb 6.4 oz)   10/10/23 86.5 kg (190 lb 12.8 oz)                      Review of Systems  Constitutional, HEENT, cardiovascular, pulmonary, gi and gu systems are negative, except as otherwise noted.     Objective    Exam  /80   Pulse 79   Temp 97.7  F (36.5  C) (Tympanic)   Resp 16   Ht 1.745 m (5' 8.7\")   Wt 90.1 kg (198 lb 9.6 oz)   SpO2 98%   BMI 29.58 kg/m     Estimated body mass index is 29.58 kg/m  as calculated from the following:    Height as of this encounter: 1.745 m (5' 8.7\").    Weight as of this encounter: 90.1 kg (198 lb 9.6 oz).    Physical Exam  GENERAL: alert and no distress  EYES: Eyes grossly normal to inspection and conjunctivae and sclerae normal  HENT: normal cephalic/atraumatic, ear canals and TM's normal, oropharynx clear, and oral mucous membranes moist  NECK: no adenopathy, no asymmetry, masses, or scars  RESP: lungs clear to auscultation - no rales, rhonchi or wheezes  CV: regular rate and rhythm, normal S1 S2, no S3 or S4, no murmur, click or rub, no peripheral edema  ABDOMEN: soft, nontender and no palpable or pulsatile masses  MS: RLE exam shows normal strength and muscle mass, no deformities, no erythema, induration, or nodules, no evidence of joint effusion, ROM of all joints is normal, and tenderness to lateral malleolus  SKIN: left lower outer buttocks with blue pale nevi, non-tender, does not deana, no bleeding, symmetrical borders, uniform color  NEURO: Normal strength and tone, mentation intact and speech normal  PSYCH: mentation appears normal, affect " normal/bright        Signed Electronically by: OLEGARIO Bolaños CNP

## 2024-07-01 NOTE — RESULT ENCOUNTER NOTE
"Snehal Maxwell    Your glucose is low- could consider checking your blood sugar when having sweating episodes to see if blood sugar is low (< 70). I can send a Rx for a glucometer if you want. B12 is in a good spot. The blood counts are stable. The lipids look good for non-fasting. Please let us know if you have any questions.      Take care,    OLEGARIO Eubanks CNP    TEST DESCRIPTIONS   CBC (Complete Blood Count) includes hemoglobin, hematocrit, white blood cells, etc. This test can be used to detect anemia, infection, and abnormalities in blood cells.   Cholesterol is one of the blood fats (lipids) and is the building block used by the body for cell wall and hormone production. Increased levels of cholesterol have been proven to directly contribute to heart disease and strokes.   Triglycerides are one of the blood fats (lipids) and are thought to be associated with heart disease. If you have had anything to eat or drink other than water for 12 hours before the test and your level is high, you should have the test repeated after a 12 hour fast. Abnormally high results may also be associated with diabetes, kidney and liver diseases.   LDL is the low density lipoprotein component of the cholesterol. It is the harmful substance that deposits cholesterol on the artery walls contributing to heart attacks and strokes. A high LDL level is associated with higher risk of coronary heart disease.   HDL stands for high density lipoprotein and refers to the so-called \"good\" cholesterol. HDL picks up excess cholesterol in the bloodstream and carries it back to the liver for disposal. Individuals with higher than average HDL seem to have a lower risk of coronary disease. Vigorous exercise will help increase the blood levels of HDL.   Risk Factor (Total cholesterol/HDL) is a commonly used ratio for cardiac risk assessment. Less than 5.0 for men and 4.4 for women is ideal.   Sodium is an electrolyte useful in diagnosis of " dehydration, diabetes, hypertension, or other diseases involving electrolyte imbalance. It also preserves the balance between calcium and potassium to maintain normal heart action and equilibrium of the body.   Potassium is also an electrolyte that works with sodium to regulate the body's water balance and normalize heart rhythm.   Glucose is a measure of blood sugar and is one of the tests for diabetes. If you have not been fasting, your level will often be high. A low glucose level may be a cause of weakness or dizziness. Blood sugar ranks with cholesterol as a causative factor in arteriosclerosis and heart attacks.   BUN & Creatinine are waste products excreted by the kidneys. A high BUN can be related to a high protein diet, heavy exercise, fever and infections, dehydration, kidney stones, and kidney disease. Creatinine elevation is less dependent on diet or exercise and better represents kidney impairment. Low values are not generally significant.   Calcium is a mineral in the blood controlled by the parathyroid glands and kidneys. It is important in the formation of bone, in muscle and nerve function, and in blood clotting. Disease of the parathyroid gland, diseased bones or kidneys, or defective absorption of calcium may cause abnormal levels from the intestine.   ALT, AST, Alk Phos are liver tests. Enzymes found in the liver as well as skeletal and cardiac muscle. Elevations can often be seen in alcoholism, liver or heart disease. Slightly abnormal values are not considered significant.   TSH stands for Thyroid Stimulating Hormone. A sensitive test used to determine how the thyroid gland is functioning. The thyroid gland produces hormones that control the body's metabolism. When too few hormones are produced (increased TSH), hypothyroidism occurs which can cause fatigue, sensitivity to cold, and weight gain - an overall slowing down of bodily functions. When too many thyroid hormones are produces (or  decreased TSH), it creates a condition call hyperthyroidism (such as Graves' disease) which causes rapid heartbeat, weight loss, and dizziness, among other symptoms.   PSA stands for Prostate Specific Antigen. Elevated levels of PSA can increase with trauma, infection, inflammation, or disease processes in the prostate such as BPH (Benigh Prostatic Hypertrophy) or cancer.   Glycohemoglobin or HgBA1C is a 3-month average of blood sugar

## 2024-07-02 ENCOUNTER — PATIENT OUTREACH (OUTPATIENT)
Dept: GASTROENTEROLOGY | Facility: CLINIC | Age: 64
End: 2024-07-02
Payer: COMMERCIAL

## 2024-10-28 ENCOUNTER — OFFICE VISIT (OUTPATIENT)
Dept: FAMILY MEDICINE | Facility: CLINIC | Age: 64
End: 2024-10-28
Payer: COMMERCIAL

## 2024-10-28 VITALS
HEART RATE: 65 BPM | TEMPERATURE: 97 F | SYSTOLIC BLOOD PRESSURE: 136 MMHG | WEIGHT: 202.8 LBS | HEIGHT: 68 IN | RESPIRATION RATE: 16 BRPM | DIASTOLIC BLOOD PRESSURE: 86 MMHG | OXYGEN SATURATION: 99 % | BODY MASS INDEX: 30.74 KG/M2

## 2024-10-28 DIAGNOSIS — Z86.19 HISTORY OF HELICOBACTER PYLORI INFECTION: ICD-10-CM

## 2024-10-28 DIAGNOSIS — R82.90 MALODOROUS URINE: ICD-10-CM

## 2024-10-28 DIAGNOSIS — R19.7 DIARRHEA, UNSPECIFIED TYPE: ICD-10-CM

## 2024-10-28 DIAGNOSIS — R10.13 EPIGASTRIC PAIN: Primary | ICD-10-CM

## 2024-10-28 LAB
ALBUMIN SERPL BCG-MCNC: 4.3 G/DL (ref 3.5–5.2)
ALBUMIN UR-MCNC: NEGATIVE MG/DL
ALP SERPL-CCNC: 62 U/L (ref 40–150)
ALT SERPL W P-5'-P-CCNC: 19 U/L (ref 0–50)
ANION GAP SERPL CALCULATED.3IONS-SCNC: 7 MMOL/L (ref 7–15)
APPEARANCE UR: CLEAR
AST SERPL W P-5'-P-CCNC: 24 U/L (ref 0–45)
BASOPHILS # BLD AUTO: 0 10E3/UL (ref 0–0.2)
BASOPHILS NFR BLD AUTO: 0 %
BILIRUB SERPL-MCNC: 0.3 MG/DL
BILIRUB UR QL STRIP: NEGATIVE
BUN SERPL-MCNC: 14.5 MG/DL (ref 8–23)
CALCIUM SERPL-MCNC: 8.7 MG/DL (ref 8.8–10.4)
CHLORIDE SERPL-SCNC: 103 MMOL/L (ref 98–107)
COLOR UR AUTO: YELLOW
CREAT SERPL-MCNC: 0.82 MG/DL (ref 0.51–0.95)
CRP SERPL-MCNC: <3 MG/L
EGFRCR SERPLBLD CKD-EPI 2021: 81 ML/MIN/1.73M2
EOSINOPHIL # BLD AUTO: 0.1 10E3/UL (ref 0–0.7)
EOSINOPHIL NFR BLD AUTO: 1 %
ERYTHROCYTE [DISTWIDTH] IN BLOOD BY AUTOMATED COUNT: 12.6 % (ref 10–15)
GLUCOSE SERPL-MCNC: 115 MG/DL (ref 70–99)
GLUCOSE UR STRIP-MCNC: NEGATIVE MG/DL
HCO3 SERPL-SCNC: 25 MMOL/L (ref 22–29)
HCT VFR BLD AUTO: 35 % (ref 35–47)
HGB BLD-MCNC: 11.7 G/DL (ref 11.7–15.7)
HGB UR QL STRIP: ABNORMAL
IMM GRANULOCYTES # BLD: 0 10E3/UL
IMM GRANULOCYTES NFR BLD: 0 %
KETONES UR STRIP-MCNC: NEGATIVE MG/DL
LEUKOCYTE ESTERASE UR QL STRIP: NEGATIVE
LIPASE SERPL-CCNC: 29 U/L (ref 13–60)
LYMPHOCYTES # BLD AUTO: 2.1 10E3/UL (ref 0.8–5.3)
LYMPHOCYTES NFR BLD AUTO: 36 %
MCH RBC QN AUTO: 35.5 PG (ref 26.5–33)
MCHC RBC AUTO-ENTMCNC: 33.4 G/DL (ref 31.5–36.5)
MCV RBC AUTO: 106 FL (ref 78–100)
MONOCYTES # BLD AUTO: 0.5 10E3/UL (ref 0–1.3)
MONOCYTES NFR BLD AUTO: 9 %
NEUTROPHILS # BLD AUTO: 3.1 10E3/UL (ref 1.6–8.3)
NEUTROPHILS NFR BLD AUTO: 54 %
NITRATE UR QL: NEGATIVE
PH UR STRIP: 5.5 [PH] (ref 5–7)
PLATELET # BLD AUTO: 193 10E3/UL (ref 150–450)
POTASSIUM SERPL-SCNC: 4.7 MMOL/L (ref 3.4–5.3)
PROT SERPL-MCNC: 7.3 G/DL (ref 6.4–8.3)
RBC # BLD AUTO: 3.3 10E6/UL (ref 3.8–5.2)
RBC #/AREA URNS AUTO: ABNORMAL /HPF
SODIUM SERPL-SCNC: 135 MMOL/L (ref 135–145)
SP GR UR STRIP: 1.01 (ref 1–1.03)
SQUAMOUS #/AREA URNS AUTO: ABNORMAL /LPF
TSH SERPL DL<=0.005 MIU/L-ACNC: 2.18 UIU/ML (ref 0.3–4.2)
UROBILINOGEN UR STRIP-ACNC: 0.2 E.U./DL
WBC # BLD AUTO: 5.8 10E3/UL (ref 4–11)
WBC #/AREA URNS AUTO: ABNORMAL /HPF

## 2024-10-28 PROCEDURE — 83690 ASSAY OF LIPASE: CPT | Performed by: NURSE PRACTITIONER

## 2024-10-28 PROCEDURE — 84443 ASSAY THYROID STIM HORMONE: CPT | Performed by: NURSE PRACTITIONER

## 2024-10-28 PROCEDURE — 80053 COMPREHEN METABOLIC PANEL: CPT | Performed by: NURSE PRACTITIONER

## 2024-10-28 PROCEDURE — 85025 COMPLETE CBC W/AUTO DIFF WBC: CPT | Performed by: NURSE PRACTITIONER

## 2024-10-28 PROCEDURE — 99213 OFFICE O/P EST LOW 20 MIN: CPT | Performed by: NURSE PRACTITIONER

## 2024-10-28 PROCEDURE — 81001 URINALYSIS AUTO W/SCOPE: CPT | Performed by: NURSE PRACTITIONER

## 2024-10-28 PROCEDURE — 86140 C-REACTIVE PROTEIN: CPT | Performed by: NURSE PRACTITIONER

## 2024-10-28 PROCEDURE — 36415 COLL VENOUS BLD VENIPUNCTURE: CPT | Performed by: NURSE PRACTITIONER

## 2024-10-28 ASSESSMENT — PATIENT HEALTH QUESTIONNAIRE - PHQ9
SUM OF ALL RESPONSES TO PHQ QUESTIONS 1-9: 7
SUM OF ALL RESPONSES TO PHQ QUESTIONS 1-9: 7
10. IF YOU CHECKED OFF ANY PROBLEMS, HOW DIFFICULT HAVE THESE PROBLEMS MADE IT FOR YOU TO DO YOUR WORK, TAKE CARE OF THINGS AT HOME, OR GET ALONG WITH OTHER PEOPLE: SOMEWHAT DIFFICULT

## 2024-10-28 NOTE — PROGRESS NOTES
Assessment & Plan     Epigastric pain    - Enteric Bacteria and Virus Panel PCR; Future  - Helicobacter pylori Antigen Stool; Future  - C. difficile Toxin B PCR with reflex to C. difficile Antigen and Toxins A/B EIA; Future  - Enteric Bacteria and Virus Panel PCR  - Helicobacter pylori Antigen Stool  - C. difficile Toxin B PCR with reflex to C. difficile Antigen and Toxins A/B EIA    Diarrhea, unspecified type    - Enteric Bacteria and Virus Panel PCR; Future  - C. difficile Toxin B PCR with reflex to C. difficile Antigen and Toxins A/B EIA; Future  - CBC with platelets and differential; Future  - Comprehensive metabolic panel (BMP + Alb, Alk Phos, ALT, AST, Total. Bili, TP); Future  - CRP, inflammation; Future  - Lipase; Future  - TSH with free T4 reflex; Future  - Enteric Bacteria and Virus Panel PCR  - C. difficile Toxin B PCR with reflex to C. difficile Antigen and Toxins A/B EIA  - CBC with platelets and differential  - Comprehensive metabolic panel (BMP + Alb, Alk Phos, ALT, AST, Total. Bili, TP)  - CRP, inflammation  - Lipase  - TSH with free T4 reflex    Malodorous urine    - UA Macroscopic with reflex to Microscopic and Culture - Clinic Collect  - UA Microscopic with Reflex to Culture    History of Helicobacter pylori infection        FUTURE APPOINTMENTS:       - Lab work up pending. Add fiber supplement daily. If symptoms persist and work up unremarkable will refer to GI.    Laura Woodruff is a 63 year old, presenting for the following health issues:  Abdominal Pain        10/28/2024     2:30 PM   Additional Questions   Roomed by Yamilka VILLELA     History of Present Illness       Reason for visit:  Stomach and colon issues    She eats 0-1 servings of fruits and vegetables daily.She consumes 2 sweetened beverage(s) daily.She exercises with enough effort to increase her heart rate 9 or less minutes per day.  She exercises with enough effort to increase her heart rate 3 or less days per week.   She is taking  "medications regularly.         Pain History:  When did you first notice your pain? 3 months    Have you seen anyone else for your pain? No  How has your pain affected your ability to work? Not applicable  Where in your body do you have pain? Abdominal/Flank Pain  Onset/Duration: 3 months   Description:   Character: Dull ache, uncomfortable   Location: starts in epigastric but feels it all over   Radiation: None  Intensity: mild to moderate   Progression of Symptoms:  worsening and now has started to be constant   Accompanying Signs & Symptoms:  Fever/Chills: No  Gas/Bloating: No  Nausea: No  Vomitting: No  Diarrhea: loose stools, fecal incontinence  Constipation: No  Dysuria or Hematuria: No, but urine and stool has a sweet smell to it   History:   Trauma: No  Previous similar pain: YES  Previous tests done: Colonoscopy and Upper Endoscopy 3/23  Precipitating factors:   Does the pain change with:     Food: YES- worse    Bowel Movement: No    Urination: No   Other factors:  No  Therapies tried and outcome: antacid-does not help   No LMP recorded. Patient has had a hysterectomy.      Return of symptoms that she had when she previously had H Pylori      Review of Systems  Constitutional, HEENT, cardiovascular, pulmonary, gi and gu systems are negative, except as otherwise noted.      Objective    /86   Pulse 65   Temp 97  F (36.1  C) (Tympanic)   Resp 16   Ht 1.734 m (5' 8.25\")   Wt 92 kg (202 lb 12.8 oz)   SpO2 99%   BMI 30.61 kg/m    Body mass index is 30.61 kg/m .  Physical Exam   GENERAL: alert and no distress  EYES: Eyes grossly normal to inspection and conjunctivae and sclerae normal  NECK: no adenopathy, no asymmetry, masses, or scars  RESP: lungs clear to auscultation - no rales, rhonchi or wheezes  CV: regular rate and rhythm, normal S1 S2, no S3 or S4, no murmur, click or rub, no peripheral edema  ABDOMEN: soft, nontender and no palpable or pulsatile masses  MS: no gross musculoskeletal defects " noted, no edema  SKIN: no suspicious lesions or rashes  NEURO: Normal strength and tone, mentation intact and speech normal  BACK: no CVA tenderness, no paralumbar tenderness    Results for orders placed or performed in visit on 10/28/24   UA Macroscopic with reflex to Microscopic and Culture - Clinic Collect     Status: Abnormal    Specimen: Urine, Midstream   Result Value Ref Range    Color Urine Yellow Colorless, Straw, Light Yellow, Yellow    Appearance Urine Clear Clear    Glucose Urine Negative Negative mg/dL    Bilirubin Urine Negative Negative    Ketones Urine Negative Negative mg/dL    Specific Gravity Urine 1.015 1.003 - 1.035    Blood Urine Trace (A) Negative    pH Urine 5.5 5.0 - 7.0    Protein Albumin Urine Negative Negative mg/dL    Urobilinogen Urine 0.2 0.2, 1.0 E.U./dL    Nitrite Urine Negative Negative    Leukocyte Esterase Urine Negative Negative   CBC with platelets and differential     Status: Abnormal   Result Value Ref Range    WBC Count 5.8 4.0 - 11.0 10e3/uL    RBC Count 3.30 (L) 3.80 - 5.20 10e6/uL    Hemoglobin 11.7 11.7 - 15.7 g/dL    Hematocrit 35.0 35.0 - 47.0 %     (H) 78 - 100 fL    MCH 35.5 (H) 26.5 - 33.0 pg    MCHC 33.4 31.5 - 36.5 g/dL    RDW 12.6 10.0 - 15.0 %    Platelet Count 193 150 - 450 10e3/uL    % Neutrophils 54 %    % Lymphocytes 36 %    % Monocytes 9 %    % Eosinophils 1 %    % Basophils 0 %    % Immature Granulocytes 0 %    Absolute Neutrophils 3.1 1.6 - 8.3 10e3/uL    Absolute Lymphocytes 2.1 0.8 - 5.3 10e3/uL    Absolute Monocytes 0.5 0.0 - 1.3 10e3/uL    Absolute Eosinophils 0.1 0.0 - 0.7 10e3/uL    Absolute Basophils 0.0 0.0 - 0.2 10e3/uL    Absolute Immature Granulocytes 0.0 <=0.4 10e3/uL   UA Microscopic with Reflex to Culture     Status: Abnormal   Result Value Ref Range    RBC Urine 0-2 0-2 /HPF /HPF    WBC Urine None Seen 0-5 /HPF /HPF    Squamous Epithelials Urine Many (A) None Seen /LPF    Narrative    Urine Culture not indicated   CBC with platelets  and differential     Status: Abnormal    Narrative    The following orders were created for panel order CBC with platelets and differential.  Procedure                               Abnormality         Status                     ---------                               -----------         ------                     CBC with platelets and d...[[id number]]  Abnormal            Final result                 Please view results for these tests on the individual orders.           Signed Electronically by: OLEGARIO Bolaños CNP

## 2024-10-29 NOTE — RESULT ENCOUNTER NOTE
Snehal Maxwell    Your lab results came back within normal/acceptable limits. No abnormal findings to explain your symptoms. I will wait for the stool tests to come back. Please let us know if you have any questions.     Take care,    OLEGARIO Eubanks CNP

## 2024-10-31 LAB

## 2024-10-31 PROCEDURE — 87507 IADNA-DNA/RNA PROBE TQ 12-25: CPT | Performed by: NURSE PRACTITIONER

## 2024-10-31 PROCEDURE — 87493 C DIFF AMPLIFIED PROBE: CPT | Mod: 59 | Performed by: NURSE PRACTITIONER

## 2024-10-31 PROCEDURE — 87338 HPYLORI STOOL AG IA: CPT | Performed by: NURSE PRACTITIONER

## 2024-11-01 ENCOUNTER — TELEPHONE (OUTPATIENT)
Dept: FAMILY MEDICINE | Facility: CLINIC | Age: 64
End: 2024-11-01
Payer: COMMERCIAL

## 2024-11-01 DIAGNOSIS — A04.8 H. PYLORI INFECTION: Primary | ICD-10-CM

## 2024-11-01 LAB — H PYLORI AG STL QL IA: POSITIVE

## 2024-11-01 RX ORDER — LEVOFLOXACIN 500 MG/1
500 TABLET, FILM COATED ORAL DAILY
Qty: 14 TABLET | Refills: 0 | Status: SHIPPED | OUTPATIENT
Start: 2024-11-01 | End: 2024-11-15

## 2024-11-01 RX ORDER — AMOXICILLIN 250 MG/1
750 CAPSULE ORAL 3 TIMES DAILY
Qty: 126 CAPSULE | Refills: 0 | Status: SHIPPED | OUTPATIENT
Start: 2024-11-01 | End: 2024-11-15

## 2024-11-01 NOTE — TELEPHONE ENCOUNTER
Patient notified and will start the medications  Patient will call to schedule lab for one month after completion of regimen    Daniella Gibbs RN on 11/1/2024 at 11:59 AM

## 2024-11-01 NOTE — RESULT ENCOUNTER NOTE
Snehal Garza c dif and stool cultures are both negative. The H pylori is still in process. Please let us know if you have any questions.     Take care,    OLEGARIO Eubanks CNP

## 2024-11-01 NOTE — TELEPHONE ENCOUNTER
Please notify Kacy her stool for H Pylori is positive again. Since this is a recurrence I'm going to treat her with a different regimen. She should make a lab appt to  stool collection supplies to retest 1 month after antibiotics are completed.

## 2024-11-19 ENCOUNTER — OFFICE VISIT (OUTPATIENT)
Dept: NEUROLOGY | Facility: CLINIC | Age: 64
End: 2024-11-19
Payer: COMMERCIAL

## 2024-11-19 VITALS
WEIGHT: 203.8 LBS | BODY MASS INDEX: 30.76 KG/M2 | DIASTOLIC BLOOD PRESSURE: 85 MMHG | SYSTOLIC BLOOD PRESSURE: 131 MMHG | HEART RATE: 70 BPM

## 2024-11-19 DIAGNOSIS — R52 PAINFUL PARESTHESIA: ICD-10-CM

## 2024-11-19 DIAGNOSIS — R20.2 PAINFUL PARESTHESIA: ICD-10-CM

## 2024-11-19 DIAGNOSIS — M54.81 BILATERAL OCCIPITAL NEURALGIA: ICD-10-CM

## 2024-11-19 DIAGNOSIS — G62.9 SENSORIMOTOR NEUROPATHY: Primary | ICD-10-CM

## 2024-11-19 PROCEDURE — G2211 COMPLEX E/M VISIT ADD ON: HCPCS | Performed by: PSYCHIATRY & NEUROLOGY

## 2024-11-19 PROCEDURE — 99213 OFFICE O/P EST LOW 20 MIN: CPT | Performed by: PSYCHIATRY & NEUROLOGY

## 2024-11-19 NOTE — PATIENT INSTRUCTIONS
Plan:  I think that some of your pain is nerve related but you may also be experiencing some issues because of musculoskeletal pain.  I think the best next step for us is for you to see a pain specialist for comprehensive consultation. They can help us come up with a treatment plan.   Return to neurology clinic in 6 months

## 2024-11-19 NOTE — NURSING NOTE
"Alissa Crawley is a 63 year old female who presents for:  Chief Complaint   Patient presents with    NEUROPATHY     Patient has been experiencing flare ups all over her body; started about 3 weeks ago.   Patient hasn't tried the Lyrica due to skeptical of the side effects.    Headache     Getting headaches almost every single day; started at the same time about 3 weeks ago as well.         Initial Vitals:  /85   Pulse 70   Wt 92.4 kg (203 lb 12.8 oz)   BMI 30.76 kg/m   Estimated body mass index is 30.76 kg/m  as calculated from the following:    Height as of 10/28/24: 1.734 m (5' 8.25\").    Weight as of this encounter: 92.4 kg (203 lb 12.8 oz).. Body surface area is 2.11 meters squared. BP completed using cuff size: wrist cuff    Al Kern  "

## 2024-11-19 NOTE — LETTER
11/19/2024      Alissa Crawley  [id number] [address] [place] [id number]      Dear Colleague,    Thank you for referring your patient, Alissa Crawley, to the [facility] [place]. Please see a copy of my visit note below.    ESTABLISHED PATIENT NEUROLOGY NOTE    DATE OF VISIT: 11/19/2024  MRN: [id number]  PATIENT NAME: Alissa Crawley  YOB: 1960    Chief Complaint   Patient presents with     NEUROPATHY     Patient has been experiencing flare ups all over her body; started about 3 weeks ago.   Patient hasn't tried the Lyrica due to skeptical of the side effects.     Headache     Getting headaches almost every single day; started at the same time about 3 weeks ago as well.      SUBJECTIVE:                                                      HISTORY OF PRESENT ILLNESS:  Alissa is here for follow up regarding neuropathy and headaches.    History as previously documented by me (2.25.24):  Previous brain MRI was normal.  She has problems with leg pain.  She has seen vascular and rheumatology as well is following with us.  EMG confirmed neuropathy.  Labs have been largely unremarkable except for mildly elevated GIOVANNY and ESR.  She also has symptoms of bilateral occipital neuralgia.  I asked her to follow-up with her rheumatologist and see her orthopedist for a new leg injury on the right and then follow-up with me in 6 months.  I also referred Kacy to pain clinic for ONB.  She had stopped her gabapentin due to lack of effectiveness for her discomfort.  Kacy says the Right leg continues to be painful. She irritates it with activity, such as walking the dog. She does have ankle pain, post-surgically. She describes some restlessness, cramping in her toes. Other pains are migratory.      She says if she takes something for sleep (over the counter gummies), this tends to trigger an a.m. headache although the headaches can happen outside of the setting as well.  She did not have  the nerve blocks completed.  It seems that the main issue is pain related to her neck, and sometimes she has trouble getting comfortable when going to sleep.  Rheumatologic workup is completed, she also saw hematology and her labs seemed to self normalize.     Ferritin was 14 about a year ago.    I recommended we start her on some Lyrica before bedtime.  I also asked Kacy to let me know if she wanted referral for repeat occipital nerve blocks. She is having more pain. Nighttimes are okay. She says a few weeks ago, she felt like her nerves started to act up. She first attributed it to some yard work. She has been doing some arm exercises. She says it feels like her Left knee is bruised.  She also tells me that she is due for cortisone shot in the right knee.  She has not tried the Lyrica because she is afraid of potential side effects.  She does have tenderness in the back of her head and radiating pain down into the arms and legs lately.    She is having more lower back pain. She has chronic bladder problems and leaks a little now. She recently underwent treatment for a bacterial stomach infection.     CURRENT MEDICATIONS:   Current Outpatient Medications   Medication Sig Dispense Refill     acetaminophen (TYLENOL) 500 MG tablet Take 500-1,000 mg by mouth daily as needed for mild pain       calcium carbonate (OS-JAZZY) 600 mg calcium (1,500 mg) tablet [CALCIUM CARBONATE (OS-JAZZY) 600 MG CALCIUM (1,500 MG) TABLET] Take 600 mg by mouth daily.       cyanocobalamin 1000 MCG tablet Take 1,000 mcg by mouth daily       esomeprazole (NEXIUM) 20 MG capsule Take 20 mg by mouth daily before breakfast       estradiol (ESTRACE) 1 MG tablet Take 1 tablet (1 mg) by mouth daily 90 tablet 3     sertraline (ZOLOFT) 100 MG tablet Take 2 tablets (200 mg) by mouth daily 180 tablet 3     verapamil ER (VERELAN) 120 MG 24 hr capsule Take 1 capsule (120 mg) by mouth twice a week Take 120 mg by mouth 2 (two) times a week. In the PM - on Weds  and Sundays 24 capsule 3     Current Facility-Administered Medications   Medication Dose Route Frequency Provider Last Rate Last Admin     BUPivacaine (MARCAINE) 0.25 % injection 4 mL  4 mL   Arias Villanueva MD   4 mL at 08/31/23 1648     methylPREDNISolone (DEPO-Medrol) injection 80 mg  80 mg   Arias Villanueva MD   80 mg at 08/31/23 1648       RECENT DIAGNOSTIC STUDIES:   Labs: No results found for any visits on 11/19/24.    REVIEW OF SYSTEMS:                                                      10-point review of systems is negative except as mentioned above in HPI.    EXAM:                                                      Physical Exam:   Vitals: /85   Pulse 70   Wt 92.4 kg (203 lb 12.8 oz)   BMI 30.76 kg/m    BMI= Body mass index is 30.76 kg/m .  GENERAL: NAD.  HEENT: NC/AT.  Mild TTP of occiputs.  CV: RRR. S1, S2.   NECK: No bruits.  PULM: Non-labored breathing.   Neurologic:  MENTAL STATUS: Alert, attentive. Speech is fluent. Normal comprehension. Normal concentration. Adequate fund of knowledge.   CRANIAL NERVES: Visual fields intact to confrontation. Pupils equally, round and reactive to light. Facial sensation and movement normal. EOM full. Hearing intact to conversation. Trapezius strength intact.   MOTOR: 5/5 in proximal and distal muscle groups of upper and lower extremities. Tone and bulk normal.   DTRs: Intact and symmetric in biceps, BR, patellae.  Babinski down-going bilaterally.   SENSATION: Normal light touch and pinprick, though she says the right foot feels different than the left. Vibration: Slightly decreased at both ankles (Rworse>L).   COORDINATION: Normal finger nose finger. Finger tapping normal. Knee heel shin normal.  STATION AND GAIT: Romberg negative. Casual gait and tandem normal.  Right hand-dominant.    ASSESSMENT and PLAN:                                                      Assessment:    ICD-10-CM    1. Sensorimotor neuropathy  G62.9 Pain Management   Referral      2. Painful paresthesia  R20.2 Pain Management  Referral    R52       3. Bilateral occipital neuralgia  M54.81 Pain Management  Referral          Ms. Crawley is a pleasant 63-year-old woman with painful paresthesias, sensory neuropathy and occipital neuralgia.  She has in recent weeks noticed a flare of her pain issues.  She is also describing some musculoskeletal pain around the knees.  I suggested that at this point, because she is a little bit hesitant about trying any additional medications, we have her see a pain specialist to get some help from a multidisciplinary approach.  Alissa is enthusiastic about this.  We will plan to follow-up again in 6 months/after she has been able to have her pain assessment completed.    Plan:  I think that some of your pain is nerve related but you may also be experiencing some issues because of musculoskeletal pain.  I think the best next step for us is for you to see a pain specialist for comprehensive consultation. They can help us come up with a treatment plan.   Return to neurology clinic in 6 months    Total Time: 22 minutes were spent with the patient and in chart review/documentation (as described above in Assessment and Plan)/coordinating the care on date of service.    Linda George MD  Neurology    Dragon software used in the dictation of this note.                    Again, thank you for allowing me to participate in the care of your patient.        Sincerely,        Linda George MD

## 2024-11-19 NOTE — PROGRESS NOTES
ESTABLISHED PATIENT NEUROLOGY NOTE    DATE OF VISIT: 11/19/2024  MRN: [id number]  PATIENT NAME: Alissa Crawley  YOB: 1960    Chief Complaint   Patient presents with    NEUROPATHY     Patient has been experiencing flare ups all over her body; started about 3 weeks ago.   Patient hasn't tried the Lyrica due to skeptical of the side effects.    Headache     Getting headaches almost every single day; started at the same time about 3 weeks ago as well.      SUBJECTIVE:                                                      HISTORY OF PRESENT ILLNESS:  Alissa is here for follow up regarding neuropathy and headaches.    History as previously documented by me (2.25.24):  Previous brain MRI was normal.  She has problems with leg pain.  She has seen vascular and rheumatology as well is following with us.  EMG confirmed neuropathy.  Labs have been largely unremarkable except for mildly elevated GIOVANNY and ESR.  She also has symptoms of bilateral occipital neuralgia.  I asked her to follow-up with her rheumatologist and see her orthopedist for a new leg injury on the right and then follow-up with me in 6 months.  I also referred Kacy to pain clinic for ONB.  She had stopped her gabapentin due to lack of effectiveness for her discomfort.  Kacy says the Right leg continues to be painful. She irritates it with activity, such as walking the dog. She does have ankle pain, post-surgically. She describes some restlessness, cramping in her toes. Other pains are migratory.      She says if she takes something for sleep (over the counter gummies), this tends to trigger an a.m. headache although the headaches can happen outside of the setting as well.  She did not have the nerve blocks completed.  It seems that the main issue is pain related to her neck, and sometimes she has trouble getting comfortable when going to sleep.  Rheumatologic workup is completed, she also saw hematology and her labs seemed to self normalize.      Ferritin was 14 about a year ago.    I recommended we start her on some Lyrica before bedtime.  I also asked Kacy to let me know if she wanted referral for repeat occipital nerve blocks. She is having more pain. Nighttimes are okay. She says a few weeks ago, she felt like her nerves started to act up. She first attributed it to some yard work. She has been doing some arm exercises. She says it feels like her Left knee is bruised.  She also tells me that she is due for cortisone shot in the right knee.  She has not tried the Lyrica because she is afraid of potential side effects.  She does have tenderness in the back of her head and radiating pain down into the arms and legs lately.    She is having more lower back pain. She has chronic bladder problems and leaks a little now. She recently underwent treatment for a bacterial stomach infection.     CURRENT MEDICATIONS:   Current Outpatient Medications   Medication Sig Dispense Refill    acetaminophen (TYLENOL) 500 MG tablet Take 500-1,000 mg by mouth daily as needed for mild pain      calcium carbonate (OS-JAZZY) 600 mg calcium (1,500 mg) tablet [CALCIUM CARBONATE (OS-JAZZY) 600 MG CALCIUM (1,500 MG) TABLET] Take 600 mg by mouth daily.      cyanocobalamin 1000 MCG tablet Take 1,000 mcg by mouth daily      esomeprazole (NEXIUM) 20 MG capsule Take 20 mg by mouth daily before breakfast      estradiol (ESTRACE) 1 MG tablet Take 1 tablet (1 mg) by mouth daily 90 tablet 3    sertraline (ZOLOFT) 100 MG tablet Take 2 tablets (200 mg) by mouth daily 180 tablet 3    verapamil ER (VERELAN) 120 MG 24 hr capsule Take 1 capsule (120 mg) by mouth twice a week Take 120 mg by mouth 2 (two) times a week. In the PM - on Weds and Sundays 24 capsule 3     Current Facility-Administered Medications   Medication Dose Route Frequency Provider Last Rate Last Admin    BUPivacaine (MARCAINE) 0.25 % injection 4 mL  4 mL   Arias Villanueva MD   4 mL at 08/31/23 8175    methylPREDNISolone  (DEPO-Medrol) injection 80 mg  80 mg   Arias Villanueva MD   80 mg at 08/31/23 5858       RECENT DIAGNOSTIC STUDIES:   Labs: No results found for any visits on 11/19/24.    REVIEW OF SYSTEMS:                                                      10-point review of systems is negative except as mentioned above in HPI.    EXAM:                                                      Physical Exam:   Vitals: /85   Pulse 70   Wt 92.4 kg (203 lb 12.8 oz)   BMI 30.76 kg/m    BMI= Body mass index is 30.76 kg/m .  GENERAL: NAD.  HEENT: NC/AT.  Mild TTP of occiputs.  CV: RRR. S1, S2.   NECK: No bruits.  PULM: Non-labored breathing.   Neurologic:  MENTAL STATUS: Alert, attentive. Speech is fluent. Normal comprehension. Normal concentration. Adequate fund of knowledge.   CRANIAL NERVES: Visual fields intact to confrontation. Pupils equally, round and reactive to light. Facial sensation and movement normal. EOM full. Hearing intact to conversation. Trapezius strength intact.   MOTOR: 5/5 in proximal and distal muscle groups of upper and lower extremities. Tone and bulk normal.   DTRs: Intact and symmetric in biceps, BR, patellae.  Babinski down-going bilaterally.   SENSATION: Normal light touch and pinprick, though she says the right foot feels different than the left. Vibration: Slightly decreased at both ankles (Rworse>L).   COORDINATION: Normal finger nose finger. Finger tapping normal. Knee heel shin normal.  STATION AND GAIT: Romberg negative. Casual gait and tandem normal.  Right hand-dominant.    ASSESSMENT and PLAN:                                                      Assessment:    ICD-10-CM    1. Sensorimotor neuropathy  G62.9 Pain Management  Referral      2. Painful paresthesia  R20.2 Pain Management  Referral    R52       3. Bilateral occipital neuralgia  M54.81 Pain Management  Referral          Ms. Crawley is a pleasant 63-year-old woman with painful paresthesias, sensory  neuropathy and occipital neuralgia.  She has in recent weeks noticed a flare of her pain issues.  She is also describing some musculoskeletal pain around the knees.  I suggested that at this point, because she is a little bit hesitant about trying any additional medications, we have her see a pain specialist to get some help from a multidisciplinary approach.  Alissa is enthusiastic about this.  We will plan to follow-up again in 6 months/after she has been able to have her pain assessment completed.    Plan:  I think that some of your pain is nerve related but you may also be experiencing some issues because of musculoskeletal pain.  I think the best next step for us is for you to see a pain specialist for comprehensive consultation. They can help us come up with a treatment plan.   Return to neurology clinic in 6 months    Total Time: 22 minutes were spent with the patient and in chart review/documentation (as described above in Assessment and Plan)/coordinating the care on date of service.    Linda George MD  Neurology    Dragon software used in the dictation of this note.

## 2024-12-17 ASSESSMENT — ANXIETY QUESTIONNAIRES
GAD7 TOTAL SCORE: 1
6. BECOMING EASILY ANNOYED OR IRRITABLE: NOT AT ALL
8. IF YOU CHECKED OFF ANY PROBLEMS, HOW DIFFICULT HAVE THESE MADE IT FOR YOU TO DO YOUR WORK, TAKE CARE OF THINGS AT HOME, OR GET ALONG WITH OTHER PEOPLE?: NOT DIFFICULT AT ALL
GAD7 TOTAL SCORE: 1
2. NOT BEING ABLE TO STOP OR CONTROL WORRYING: NOT AT ALL
4. TROUBLE RELAXING: SEVERAL DAYS
5. BEING SO RESTLESS THAT IT IS HARD TO SIT STILL: NOT AT ALL
7. FEELING AFRAID AS IF SOMETHING AWFUL MIGHT HAPPEN: NOT AT ALL
3. WORRYING TOO MUCH ABOUT DIFFERENT THINGS: NOT AT ALL
5. BEING SO RESTLESS THAT IT IS HARD TO SIT STILL: NOT AT ALL
GAD7 TOTAL SCORE: 1
GAD7 TOTAL SCORE: 1
1. FEELING NERVOUS, ANXIOUS, OR ON EDGE: NOT AT ALL
6. BECOMING EASILY ANNOYED OR IRRITABLE: NOT AT ALL
2. NOT BEING ABLE TO STOP OR CONTROL WORRYING: NOT AT ALL
IF YOU CHECKED OFF ANY PROBLEMS ON THIS QUESTIONNAIRE, HOW DIFFICULT HAVE THESE PROBLEMS MADE IT FOR YOU TO DO YOUR WORK, TAKE CARE OF THINGS AT HOME, OR GET ALONG WITH OTHER PEOPLE: NOT DIFFICULT AT ALL
7. FEELING AFRAID AS IF SOMETHING AWFUL MIGHT HAPPEN: NOT AT ALL
GAD7 TOTAL SCORE: 1
1. FEELING NERVOUS, ANXIOUS, OR ON EDGE: NOT AT ALL
7. FEELING AFRAID AS IF SOMETHING AWFUL MIGHT HAPPEN: NOT AT ALL
8. IF YOU CHECKED OFF ANY PROBLEMS, HOW DIFFICULT HAVE THESE MADE IT FOR YOU TO DO YOUR WORK, TAKE CARE OF THINGS AT HOME, OR GET ALONG WITH OTHER PEOPLE?: NOT DIFFICULT AT ALL
7. FEELING AFRAID AS IF SOMETHING AWFUL MIGHT HAPPEN: NOT AT ALL
3. WORRYING TOO MUCH ABOUT DIFFERENT THINGS: NOT AT ALL
IF YOU CHECKED OFF ANY PROBLEMS ON THIS QUESTIONNAIRE, HOW DIFFICULT HAVE THESE PROBLEMS MADE IT FOR YOU TO DO YOUR WORK, TAKE CARE OF THINGS AT HOME, OR GET ALONG WITH OTHER PEOPLE: NOT DIFFICULT AT ALL
4. TROUBLE RELAXING: SEVERAL DAYS
GAD7 TOTAL SCORE: 1

## 2024-12-17 ASSESSMENT — PAIN SCALES - PAIN ENJOYMENT GENERAL ACTIVITY SCALE (PEG)
AVG_PAIN_PASTWEEK: 8
INTERFERED_GENERAL_ACTIVITY: 8
INTERFERED_GENERAL_ACTIVITY: 8
PEG_TOTALSCORE: 8
PEG_TOTALSCORE: 8
INTERFERED_ENJOYMENT_LIFE: 8
AVG_PAIN_PASTWEEK: 8
INTERFERED_ENJOYMENT_LIFE: 8
INTERFERED_GENERAL_ACTIVITY: 8
INTERFERED_ENJOYMENT_LIFE: 8
AVG_PAIN_PASTWEEK: 8
AVG_PAIN_PASTWEEK: 8
PEG_TOTALSCORE: 8
INTERFERED_ENJOYMENT_LIFE: 8
INTERFERED_GENERAL_ACTIVITY: 8
PEG_TOTALSCORE: 8

## 2024-12-19 ENCOUNTER — OFFICE VISIT (OUTPATIENT)
Dept: FAMILY MEDICINE | Facility: CLINIC | Age: 64
End: 2024-12-19
Payer: COMMERCIAL

## 2024-12-19 VITALS
TEMPERATURE: 97.8 F | HEIGHT: 70 IN | SYSTOLIC BLOOD PRESSURE: 124 MMHG | DIASTOLIC BLOOD PRESSURE: 78 MMHG | HEART RATE: 71 BPM | WEIGHT: 202.6 LBS | OXYGEN SATURATION: 97 % | BODY MASS INDEX: 29.01 KG/M2 | RESPIRATION RATE: 16 BRPM

## 2024-12-19 DIAGNOSIS — M79.10 MYALGIA: Primary | ICD-10-CM

## 2024-12-19 DIAGNOSIS — M54.12 CERVICAL RADICULOPATHY: ICD-10-CM

## 2024-12-19 DIAGNOSIS — F32.1 MAJOR DEPRESSIVE DISORDER, SINGLE EPISODE, MODERATE (H): ICD-10-CM

## 2024-12-19 DIAGNOSIS — D46.9 MDS (MYELODYSPLASTIC SYNDROME) (H): ICD-10-CM

## 2024-12-19 DIAGNOSIS — R10.9 FLANK PAIN: ICD-10-CM

## 2024-12-19 DIAGNOSIS — Z11.59 NEED FOR HEPATITIS C SCREENING TEST: ICD-10-CM

## 2024-12-19 DIAGNOSIS — Z11.4 SCREENING FOR HIV (HUMAN IMMUNODEFICIENCY VIRUS): ICD-10-CM

## 2024-12-19 LAB
ALBUMIN UR-MCNC: ABNORMAL MG/DL
ANION GAP SERPL CALCULATED.3IONS-SCNC: 6 MMOL/L (ref 7–15)
APPEARANCE UR: CLEAR
B BURGDOR IGG+IGM SER QL: 0.09
BACTERIA #/AREA URNS HPF: ABNORMAL /HPF
BILIRUB UR QL STRIP: NEGATIVE
BUN SERPL-MCNC: 21.1 MG/DL (ref 8–23)
CA-I BLD-MCNC: 4.5 MG/DL (ref 4.4–5.2)
CALCIUM SERPL-MCNC: 8.9 MG/DL (ref 8.8–10.4)
CHLORIDE SERPL-SCNC: 108 MMOL/L (ref 98–107)
CK SERPL-CCNC: 42 U/L (ref 26–192)
COLOR UR AUTO: YELLOW
CREAT SERPL-MCNC: 0.96 MG/DL (ref 0.51–0.95)
CRP SERPL-MCNC: 3.43 MG/L
EGFRCR SERPLBLD CKD-EPI 2021: 67 ML/MIN/1.73M2
ERYTHROCYTE [SEDIMENTATION RATE] IN BLOOD BY WESTERGREN METHOD: 27 MM/HR (ref 0–30)
GLUCOSE SERPL-MCNC: 105 MG/DL (ref 70–99)
GLUCOSE UR STRIP-MCNC: NEGATIVE MG/DL
HCO3 SERPL-SCNC: 28 MMOL/L (ref 22–29)
HCV AB SERPL QL IA: NONREACTIVE
HGB UR QL STRIP: ABNORMAL
HIV 1+2 AB+HIV1 P24 AG SERPL QL IA: NONREACTIVE
KETONES UR STRIP-MCNC: NEGATIVE MG/DL
LEUKOCYTE ESTERASE UR QL STRIP: NEGATIVE
NITRATE UR QL: NEGATIVE
PH UR STRIP: 5.5 [PH] (ref 5–7)
POTASSIUM SERPL-SCNC: 4.4 MMOL/L (ref 3.4–5.3)
PTH-INTACT SERPL-MCNC: 80 PG/ML (ref 15–65)
RBC #/AREA URNS AUTO: ABNORMAL /HPF
SODIUM SERPL-SCNC: 142 MMOL/L (ref 135–145)
SP GR UR STRIP: >=1.03 (ref 1–1.03)
SQUAMOUS #/AREA URNS AUTO: ABNORMAL /LPF
UROBILINOGEN UR STRIP-ACNC: 0.2 E.U./DL
WBC #/AREA URNS AUTO: ABNORMAL /HPF

## 2024-12-19 RX ORDER — PREDNISONE 20 MG/1
TABLET ORAL
Qty: 20 TABLET | Refills: 0 | Status: SHIPPED | OUTPATIENT
Start: 2024-12-19

## 2024-12-19 ASSESSMENT — ENCOUNTER SYMPTOMS: BACK PAIN: 1

## 2024-12-19 ASSESSMENT — PAIN SCALES - GENERAL: PAINLEVEL_OUTOF10: MODERATE PAIN (5)

## 2024-12-19 NOTE — PROGRESS NOTES
"  Assessment & Plan     Myalgia    - CRP inflammation; Future  - Erythrocyte sedimentation rate auto; Future  - Lyme Disease Total Antibodies with Reflex to Confirmation; Future  - CK total; Future  - Tick-Borne Disease Panel by PCR; Future  - Basic metabolic panel  (Ca, Cl, CO2, Creat, Gluc, K, Na, BUN); Future  - Parathyroid Hormone Intact; Future  - Ionized Calcium; Future  - predniSONE (DELTASONE) 20 MG tablet; Take 3 tabs by mouth daily x 3 days, then 2 tabs daily x 3 days, then 1 tab daily x 3 days, then 1/2 tab daily x 3 days.    Cervical radiculopathy    - XR Cervical Spine 2/3 Views; Future  - predniSONE (DELTASONE) 20 MG tablet; Take 3 tabs by mouth daily x 3 days, then 2 tabs daily x 3 days, then 1 tab daily x 3 days, then 1/2 tab daily x 3 days.    Flank pain    - UA Macroscopic with reflex to Microscopic and Culture - Clinic Collect  - UA Microscopic with Reflex to Culture    Screening for HIV (human immunodeficiency virus)    - HIV Antigen Antibody Combo; Future    Need for hepatitis C screening test    - Hepatitis C Screen Reflex to HCV RNA Quant and Genotype; Future    MDS (myelodysplastic syndrome) (H)  Known issue that I take into account for their medical decisions, no current exacerbations or new concerns, followed by Hematology.      Major depressive disorder, single episode, moderate (H)  Stable on current medication.          BMI  Estimated body mass index is 29.07 kg/m  as calculated from the following:    Height as of this encounter: 1.778 m (5' 10\").    Weight as of this encounter: 91.9 kg (202 lb 9.6 oz).         CONSULTATION/REFERRAL to Pain MGMT as planned. Recommend pain consult for injections if steroid taper is not effective.     FUTURE APPOINTMENTS:       - Follow-up visit in case of new or worsening symptoms.     See Patient Instructions    Laura Woodruff is a 64 year old, presenting for the following health issues:  Back Pain    History of Present Illness       Back Pain:  She " presents for follow up of back pain. Patient's back pain is a chronic problem.  Location of back pain:  Right lower back, left lower back, right side of neck, left side of neck, right shoulder, left shoulder and left buttock  Description of back pain: burning, sharp and stabbing  Back pain spreads: nowhere    Since patient first noticed back pain, pain is: always present, but gets better and worse  Does back pain interfere with her job:  Yes       She eats 0-1 servings of fruits and vegetables daily.She consumes 2 sweetened beverage(s) daily.She exercises with enough effort to increase her heart rate 9 or less minutes per day.  She exercises with enough effort to increase her heart rate 3 or less days per week.   She is taking medications regularly.     Hx of MDS and Neuropathy. Having a flare of pain in upper back, arms, lower back. Movement does not impact the pain. Worse with sitting for long periods and lying in bed at night. Feels stiff in the morning. Hx of + GIOVANNY and has seen Rheumatology for work up without any definitive diagnosis. Had a similar flare 07/2022. Will see pain clinic next week based on Neuro recommendations.      Pain History:  When did you first notice your pain? October   Have you seen this provider for your pain in the past? Yes   Where in your body do you have pain? Bilateral lower back, bilateral neck, bilateral shoulders and left buttock  Are you seeing anyone else for your pain? Yes - upcoming; neurology previously        6/27/2024    10:15 AM 10/28/2024     7:53 AM 12/18/2024    10:01 AM   PHQ-9 SCORE   PHQ-9 Total Score MyChart 8 (Mild depression) 7 (Mild depression) 7 (Mild depression)   PHQ-9 Total Score 8 7  7        Patient-reported           10/6/2023     9:06 AM 12/17/2024     9:32 AM   SAPPHIRE-7 SCORE   Total Score 9 (mild anxiety) 1 (minimal anxiety)   Total Score 9 1        Patient-reported               6/27/2024    10:15 AM 10/28/2024     7:53 AM 12/18/2024    10:01 AM   PHQ-9  "SCORE   PHQ-9 Total Score MyChart 8 (Mild depression) 7 (Mild depression) 7 (Mild depression)   PHQ-9 Total Score 8 7  7        Patient-reported           10/6/2023     9:06 AM 12/17/2024     9:32 AM   SAPPHIRE-7 SCORE   Total Score 9 (mild anxiety) 1 (minimal anxiety)   Total Score 9 1        Patient-reported           12/19/2024     7:50 AM   PEG Score   PEG Total Score 8         PDMP Review       None          Last CSA Agreement:   CSA -- Patient Level:    CSA: None found at the patient level.                   Review of Systems  Constitutional, HEENT, cardiovascular, pulmonary, gi and gu systems are negative, except as otherwise noted.      Objective    /78   Pulse 71   Temp 97.8  F (36.6  C) (Tympanic)   Resp 16   Ht 1.778 m (5' 10\")   Wt 91.9 kg (202 lb 9.6 oz)   SpO2 97%   BMI 29.07 kg/m    Body mass index is 29.07 kg/m .  Physical Exam   GENERAL: alert and no distress  MS: extremities normal- no gross deformities noted, no tenderness to palpation, ROM wnl, no edema, normal strength.  SKIN: no suspicious lesions or rashes  NEURO: Normal strength and tone, mentation intact and speech normal    Results for orders placed or performed in visit on 12/19/24   UA Macroscopic with reflex to Microscopic and Culture - Clinic Collect     Status: Abnormal    Specimen: Urine, Clean Catch   Result Value Ref Range    Color Urine Yellow Colorless, Straw, Light Yellow, Yellow    Appearance Urine Clear Clear    Glucose Urine Negative Negative mg/dL    Bilirubin Urine Negative Negative    Ketones Urine Negative Negative mg/dL    Specific Gravity Urine >=1.030 1.003 - 1.035    Blood Urine Small (A) Negative    pH Urine 5.5 5.0 - 7.0    Protein Albumin Urine Trace (A) Negative mg/dL    Urobilinogen Urine 0.2 0.2, 1.0 E.U./dL    Nitrite Urine Negative Negative    Leukocyte Esterase Urine Negative Negative   UA Microscopic with Reflex to Culture     Status: Abnormal   Result Value Ref Range    Bacteria Urine None Seen None " Seen /HPF    RBC Urine 2-5 (A) 0-2 /HPF /HPF    WBC Urine 0-5 0-5 /HPF /HPF    Squamous Epithelials Urine Few (A) None Seen /LPF    Narrative    Urine Culture not indicated           Signed Electronically by: OLEGARIO Bolaños CNP

## 2024-12-19 NOTE — RESULT ENCOUNTER NOTE
Snehal Alissa    Your urine has some red blood cells and the kidney function is slightly off. I am going to have you schedule a Ct scan of the abdomen/pelvis to look at the kidneys to eval for kidney stone. Rest of labs look good so far. Please call [facility] Department to schedule your imaging [phone]    Please let us know if you have any questions.     Take care,    OLEGARIO Eubanks CNP

## 2024-12-23 ENCOUNTER — HOSPITAL ENCOUNTER (OUTPATIENT)
Dept: BONE DENSITY | Facility: HOSPITAL | Age: 64
Discharge: HOME | End: 2024-12-23
Attending: NURSE PRACTITIONER | Admitting: NURSE PRACTITIONER
Payer: COMMERCIAL

## 2024-12-23 DIAGNOSIS — R79.89 ELEVATED PARATHYROID HORMONE: ICD-10-CM

## 2024-12-23 PROCEDURE — 77080 DXA BONE DENSITY AXIAL: CPT

## 2024-12-23 PROCEDURE — 77091 TBS TECHL CALCULATION ONLY: CPT

## 2024-12-24 ENCOUNTER — OFFICE VISIT (OUTPATIENT)
Dept: PALLIATIVE MEDICINE | Facility: CLINIC | Age: 64
End: 2024-12-24
Attending: PSYCHIATRY & NEUROLOGY
Payer: COMMERCIAL

## 2024-12-24 VITALS — DIASTOLIC BLOOD PRESSURE: 83 MMHG | HEART RATE: 65 BPM | SYSTOLIC BLOOD PRESSURE: 120 MMHG

## 2024-12-24 DIAGNOSIS — R52 PAINFUL PARESTHESIA: ICD-10-CM

## 2024-12-24 DIAGNOSIS — R15.9 BOWEL AND BLADDER INCONTINENCE: ICD-10-CM

## 2024-12-24 DIAGNOSIS — R29.2 ABNORMAL DTR (DEEP TENDON REFLEX): ICD-10-CM

## 2024-12-24 DIAGNOSIS — M54.16 LUMBAR RADICULOPATHY: ICD-10-CM

## 2024-12-24 DIAGNOSIS — G62.9 SENSORIMOTOR NEUROPATHY: ICD-10-CM

## 2024-12-24 DIAGNOSIS — R20.2 PARESTHESIA OF LEFT UPPER EXTREMITY: ICD-10-CM

## 2024-12-24 DIAGNOSIS — M54.81 BILATERAL OCCIPITAL NEURALGIA: ICD-10-CM

## 2024-12-24 DIAGNOSIS — R32 BOWEL AND BLADDER INCONTINENCE: ICD-10-CM

## 2024-12-24 DIAGNOSIS — R20.2 PAINFUL PARESTHESIA: ICD-10-CM

## 2024-12-24 DIAGNOSIS — M54.12 CERVICAL RADICULOPATHY: Primary | ICD-10-CM

## 2024-12-24 PROCEDURE — G2211 COMPLEX E/M VISIT ADD ON: HCPCS | Performed by: NURSE PRACTITIONER

## 2024-12-24 PROCEDURE — 99205 OFFICE O/P NEW HI 60 MIN: CPT | Performed by: NURSE PRACTITIONER

## 2024-12-24 ASSESSMENT — PAIN SCALES - GENERAL: PAINLEVEL_OUTOF10: MODERATE PAIN (5)

## 2024-12-24 NOTE — PATIENT INSTRUCTIONS
PATIENT INSTRUCTIONS:     I am recommending a multidisciplinary treatment plan to help this patient better manage her pain. The following recommendations were given to the patient. Diagnosis, treatment options, risks, benefits, and alternatives were discussed; all questions were answered. Self-care instructions were given. The patient expressed understanding of the plan for management.     Continue Current Treatment Plan with:   - Existing medications, as prescribed by your current prescribers       New Referrals:   - Physical Therapy:    Referral placed today for evaluation and treatment for strengthening and conditioning of neck pain      New Orders:   - Diagnostic Imaging:  Cervical MRI and Lumbar MRI  at [facility].  Please call [phone] to schedule.      Return to Clinic:   -  30-minute In-Person Appointment with Fiona Thurston, JUSTIN, OLEGARIO, CINDY in 4 weeks, or sooner if needed      Future Considerations:   We will review your MRI results at next visit and determine a plan for treatment.             ----------------------------------------------------------------  Clinic Number:  [phone]   Call with any questions about your care and for scheduling assistance.   Calls are returned Monday through Friday between 8 AM and 4:30 PM. We usually get back to you within 2 business days depending on the issue/request.    If we are prescribing your medications:  For opioid medication refills, call the clinic or send a [employer] message 7 days in advance.  Please include:  Name of requested medication  Name of the pharmacy.  For non-opioid medications, call your pharmacy directly to request a refill. Please allow 3-4 days to be processed.   Per [place] State Law:  All controlled substance prescriptions must be filled within 30 days of being written.    For those controlled substances allowing refills, pickup must occur within 30 days of last fill.      We believe regular attendance is key to your success in our  program!    Any time you are unable to keep your appointment we ask that you call us at least 24 hours in advance to cancel.This will allow us to offer the appointment time to another patient.   Multiple missed appointments may lead to dismissal from the clinic.

## 2024-12-24 NOTE — PROGRESS NOTES
"[facility] Pain Management   Comprehensive Pain Evaluation    Date of Visit: Dec 24, 2024     CHIEF COMPLAINT:    Chief Complaint   Patient presents with    Pain          REASON FOR VISIT:    Alissa Crawley is a 64 year old female who is seen today at the request of Linda George MD (Neurology) for evaluation of her pain issues and recommendations for management; with specific emphasis on:  neuropathy and bilateral occipital neuralgia         Subjective    HISTORY OF PRESENT ILLNESS:  Onset/Progression:   Alissa Crawley is a 64 year old female with history of Myelodysplastic Syndrome, Depression, Cervical Radiculopathy, Myalgia.      Pain Problem #1: Low back pain   Started 3 years ago; will have \"major flare ups\" of pain.  Pain radiates into left leg to knee.  Feels numbness underneath toes of left foot; numbness to left knee.   Muscles in left leg will tighten up; feels stiffness; improves with stretching.   Has neuropathy in right leg from knee to toes; started after fracture of right leg.    Reports bladder urgency; especially when getting out of bed in the morning.   Occasional loss of bowel control over past 12 months.   Denies saddle paresthesia    Pain Problem #2: Neck pain  Neck pain started in October 2024  Bilateral arm pain started 3 years ago   Feels tightness and numbness from neck into arms  Neck pain is bilateral; starts at occiput and radiates toward shoulders.   Wakes several times during night because \"my arms fall asleep\"; wakes from bilateral arm pain and notes numbness.     Takes Verapamil for cluster headaches; very helpful       Pain score today: Moderate Pain (5)   Pain Description and Location:       Quality: (Patient-Rptd) sharp, numbness, cutting, throbbing   Duration: (Patient-Rptd) Constant   Severity/Intensity (0 = No pain to 10 = Worst pain imaginable)   Now: (Patient-Rptd) 6, Average: (Patient-Rptd) 7, Best: (Patient-Rptd) 6, Worst: (Patient-Rptd) " 9  Aggravating factors include: (Patient-Rptd) lying down, standing, sitting, walking  Relieving factors include: (Patient-Rptd) exercise     SAPPHIRE score: Answers submitted by the patient for this visit:  Patient Health Questionnaire (G7) (Submitted on 12/17/2024)  SAPPHIRE 7 TOTAL SCORE: 1       Past Pain Treatments:   Pain Clinic:   No    Physical therapy: Yes  - for arms   Psychologist: No   Chiropractor: No   Acupuncture: No   Pharmacotherapy:    Opioids: No     Non-opioids:  Yes  TENs Unit/electric stim: No   Heat/Cold: No     Injections/clinic procedures: Yes    Right knee steroid injection 2022?    Surgeries related to pain: Yes   ORIF Right tib/fib 11/28/2020       Current Pain Relevant Medications:    Acetaminophen 500mg - takes 2 tabs (1000mg) PRN for headaches; 2-3x/month       Other Relevant Medications:   Sertraline 100mg - takes 200mg daily   Verapamil 120mg ER for headaches       Previous Pain Relevant Medications: (H--helped; HI--Helped initially; [facility]--Somewhat helpful; NH--No help; W--worse; SE--side effects; A--allergy; ?--Unsure if helpful)   Opioids: oxycodone (H), fentanyl (H), morphine (H)   NSAIDs: Ibuprofen (SE)   Muscle relaxants: tizanidine (H)    Neuroleptics: Gabapentin (H)   Pain Antidepressants/ Anxiolytics:  Bupropion (Wellbutrin), Sertraline (Zoloft)  Sleep Aids: Melatonin (H)   Migraine Medications: Verapamil for cluster headaches    Topical: none    Adjuvant medications: Acetaminophen (H) and THC (NH)          Review of [place] Prescription Monitoring Program ():  reviewed on 12/24/24. No concern for abuse or misuse of controlled medications based on this report. Controlled substances prescribed in the past 6 months include: (Pregabalin 75mg)   Current MME: 0 / day    Current PDMP reportable controlled substance medications, if any, are being prescribed by: None    Primary Care Provider is Melly Roach         Past Medical History   She  has a past medical history of  "Arthritis, Chronic laryngitis (11/27/2020), Depressive disorder, Dysthymia (11/27/2020), Episodic cluster headache (11/27/2020), Gastroesophageal reflux disease (11/27/2020), H. pylori infection (03/16/2023), History of blood transfusion, and Tibia/fibula fracture, left, closed, with malunion, subsequent encounter (11/28/2020).   Past Surgical History   She  has a past surgical history that includes Cholecystectomy; TREAT TIBIAL SHAFT FX, INTRAMED IMPLANT (Right, 11/28/2020); Open reduction internal fixation fibula (Right, 11/28/2020); Hysterectomy, Pap No Longer Indicated; Colonoscopy (N/A, 03/03/2023); Esophagoscopy, gastroscopy, duodenoscopy (EGD), combined (N/A, 03/03/2023); Colonoscopy (N/A, 03/03/2023); and biopsy.   Social History   Social History     Tobacco Use    Smoking status: Never     Passive exposure: Current (Father smoked a cigar. Currently lives with 2 sons that both smoke.)    Smokeless tobacco: Never   Vaping Use    Vaping status: Never Used   Substance Use Topics    Alcohol use: Yes     Comment: Alcoholic Drinks/day: \"A drink or two on the weekend\"    Drug use: Never      Social History     Social History Narrative    Not on file        Current Outpatient Medications   Medication Sig Dispense Refill    acetaminophen (TYLENOL) 500 MG tablet Take 500-1,000 mg by mouth daily as needed for mild pain      calcium carbonate (OS-JAZZY) 600 mg calcium (1,500 mg) tablet [CALCIUM CARBONATE (OS-JAZZY) 600 MG CALCIUM (1,500 MG) TABLET] Take 600 mg by mouth daily.      cyanocobalamin 1000 MCG tablet Take 1,000 mcg by mouth daily      esomeprazole (NEXIUM) 20 MG capsule Take 20 mg by mouth daily before breakfast      estradiol (ESTRACE) 1 MG tablet Take 1 tablet (1 mg) by mouth daily 90 tablet 3    sertraline (ZOLOFT) 100 MG tablet Take 2 tablets (200 mg) by mouth daily 180 tablet 3    verapamil ER (VERELAN) 120 MG 24 hr capsule Take 1 capsule (120 mg) by mouth twice a week Take 120 mg by mouth 2 (two) times a " week. In the PM - on Weds and Sundays 24 capsule 3    predniSONE (DELTASONE) 20 MG tablet Take 3 tabs by mouth daily x 3 days, then 2 tabs daily x 3 days, then 1 tab daily x 3 days, then 1/2 tab daily x 3 days. (Patient not taking: Reported on 12/24/2024) 20 tablet 0     Current Facility-Administered Medications   Medication Dose Route Frequency Provider Last Rate Last Admin    BUPivacaine (MARCAINE) 0.25 % injection 4 mL  4 mL   Arias Villanueva MD   4 mL at 08/31/23 1648    methylPREDNISolone (DEPO-Medrol) injection 80 mg  80 mg   Arias Villanueva MD   80 mg at 08/31/23 1648     No Known Allergies     Medications and Allergies reviewed. Yes        REVIEW OF SYSTEMS:   ROS: 10 point ROS neg other than the symptoms noted above in the HPI.     The patient otherwise denies red flag symptoms, such as: thunderclap headache, bowel or bladder incontinence, parasthesias, weakness, saddle anesthesia, unintentional weight loss, or fever/chills/sweats.     Objective   OBJECTIVE    Vitals:    12/24/24 0815   BP: 120/83   Pulse: 65         Appearance:   A&O. Patient is appropriate. Patient is in NAD.      HHENT:  Normocephalic, atraumatic. Eyes without conjunctival injection or jaundice. Neck supple.     Pulmonary:  Normal effort; no cough or audible wheeze.       Skin: No obvious rash, lesions, or petechiae of exposed skin.    Psych:  Alert, without lethargy or stupor. Speech fluent. Mood normal. Able to follow commands without difficulty. Appropriate judgement and behavior.    Tearful or anxious affect: NO   Suicidal or homicidal ideation: NO      Gait pattern:  Patient has an antalgic gait pattern:YES  Gait favors the neither side.  Mobility and/or assistive devices? NO      Able to walk on the heels and toes with moderate difficulty.      Strength:   Deltoid: R: 5/5  L: 5/5  Biceps: R: 5/5  L: 5/5  Triceps: R: 5/5  L: 5/5  Intrinsic hand: R: 5/5  L: 5/5    Knee ext: R: 5/5  L: 5/5  Knee flex: R: 5/5  L:  5/5  Dorsiflexion: R: 5/5  L: 5/5  Plantarflexion: R: 5/5  L: 5/5  Great toe ext:  R: 5/5  L: 5/5       Neurological:   Deep Tendon Reflex exam:   Biceps:  R:  2/4   L: 3/4   Patella:  R:  2/4   L: 3/4    Griffith's: negative bilaterally       Sensory exam:  (Upper and Lower Extremities)   Light touch: abnormal - right LE, left UE    Vibration: normal    Allodynia: absent   Dysethesia: present - right LE, left UE    Hyperalgesia: absent     Cervical spine:  Range of motion by visual estimation   Flex:  60 degrees (0 - 60); painful   Ext: 75 degrees (0 - 75); painful   Rotation to right: 70 degrees (0 - 80); painful   Rotation to left: 70 degrees (0 - 80); painful   Tenderness in the cervical spine at midline. Yes  Tenderness in the cervical paraspinal muscles. Yes    Thoracic spine:   Kyphosis. No   Tenderness in the thoracic spine at midline. No  Tenderness in the thoracic paraspinal muscles. No    Lumbar/Sacral spine:  Range of motion by visual estimation   Forward Flexion:  35 degrees (0 - 60); painful    Ext: 10 degrees (0 - 25); painful   Rotation/ext to right: painful   Rotation/ext to left: painful   Lordosis. No  Tenderness in the lumbar spine at midline. No  Tenderness in the lumbar paraspinal muscles.No  Straight leg exam:  Right: positive    Left:  negative  Neural Slump test:  Right: negative    Left:  negative  Chloe/Dima's test:     Right: negative    Left:  negative  Passive internal rotation:   Right: negative    Left: negative   Active external rotation:    Right: negative    Left: negative  Tenderness over SI joint:     Right: positive    Left:  positive  Tenderness over Trochanteric Bursa:    Right: negative    Left: negative      Diagnostic Testing:  Labs:      Lab Results   Component Value Date    CR 0.96 (H) 12/19/2024    GFRESTIMATED 67 12/19/2024    ALKPHOS 62 10/28/2024    AST 24 10/28/2024    ALT 19 10/28/2024              Imaging:     XR Cervical Spine 12/19/24  IMPRESSION: No gross  cervical vertebral body height loss is  identified. Straightening of the normal cervical lordosis. Alignment  otherwise appears normal. Mild disc space narrowing C5-C6. The other  intervertebral disc spaces appear relatively maintained in height.  Small marginal anterior endplate osteophytes. Scattered degenerative  changes of the facet joints, including at C2-C3. Degenerative change  of the median atlantoaxial joint. The prevertebral soft tissues appear  normal in thickness.     On the open-mouth odontoid view, no evidence for a displaced fracture  of the base of the odontoid process, and the lateral masses of C1 and  C2 appear symmetric.        Assessment & Plan      VISIT DIAGNOSIS:   1. Cervical radiculopathy (Primary)  - MR Cervical Spine w/o Contrast; Future  - Physical Therapy  Referral; Future  - Adult Pain Clinic Follow-Up Order; Future    2. Lumbar radiculopathy  - MR Lumbar Spine w/o Contrast; Future  - Physical Therapy  Referral; Future  - Adult Pain Clinic Follow-Up Order; Future    3. Bowel and bladder incontinence  - MR Lumbar Spine w/o Contrast; Future  - Adult Pain Clinic Follow-Up Order; Future    4. Abnormal DTR (deep tendon reflex)  - MR Cervical Spine w/o Contrast; Future  - MR Lumbar Spine w/o Contrast; Future  - Adult Pain Clinic Follow-Up Order; Future    5. Sensorimotor neuropathy  - Pain Management  Referral  - Adult Pain Clinic Follow-Up Order; Future    6. Painful paresthesia  - Pain Management  Referral  - Adult Pain Clinic Follow-Up Order; Future    7. Bilateral occipital neuralgia  - Pain Management  Referral  - Adult Pain Clinic Follow-Up Order; Future    8. Paresthesia of left upper extremity  - MR Cervical Spine w/o Contrast; Future  - Adult Pain Clinic Follow-Up Order; Future        ASSESSMENT:    Visit discussion: Alissa Crawley is a 64 year old female with a past medical history significant for chronic low back pain, recent onset  cervical radiculopathy, polyneuralgia, myelodysplastic syndrome and depression who presents with complaints of cervical and lumbar radiculopathy. Patient had a lumbar MRI completed on 8/24/2022.  However, within the past 9 to 12 months she has developed changes in bowel and bladder control with intermittent loss of bowel function.  Physical exam does indicate hyperreflexive DTRs in left lower extremity and left upper extremity.  She does have left upper extremity paresthesias and progressing lower extremity neuropathy.  Because of changes in bowel and bladder control, I recommend lumbar MRI  to rule out red flag concerns such as cauda equina syndrome.  I am recommending physical therapy for cervical radiculopathy and upper extremity paresthesias.  However, there is benefit in obtaining cervical MRI at the same time as her lumbar MRI.  Orders placed for both cervical and lumbar MRI.  Patient was advised that she may need to complete a course of physical therapy before approval of cervical MRI.  However, considering hyper active left upper extremity reflexes, I would advise advancing to cervical MRI sooner.  Patient is hesitant to start any medication with known side effects that can impact mental health status or cause weight gain.  We did discuss the potential benefits of various medication classes and reasons why we would advance to these medications.  Also addressed known side effects and individualized response to medications varies.  In the future, we may consider gabapentin for neuropathic pain.  However at this time, we will obtain imaging and review MRI in approximately 4 weeks.  At that time we will make a decision to advance care based on MRI findings.  Patient is advised to follow-up in 4 weeks.  She verbalizes understanding and agreement with plan.     PLAN:    Continue Current Treatment Plan with:   - Existing medications, as prescribed by your current prescribers       New Referrals:   - Physical  Therapy:    Referral placed today for evaluation and treatment for strengthening and conditioning of neck pain      New Orders:   - Diagnostic Imaging:  Cervical MRI and Lumbar MRI  at [facility] [place] [facility].  Please call [phone] to schedule.      Return to Clinic:   -  30-minute In-Person Appointment with Fiona Thurston DNP, APRN, FNP-C in 4 weeks, or sooner if needed      Future Considerations:   We will review your MRI results at next visit and determine a plan for treatment.     ___________________________________________________________________    BILLING TIME DOCUMENTATION:   TOTAL TIME includes:   Time spent preparing to see the patient: 10 minutes (reviewing records and tests)  Time spend face to face with the patient: 52 minutes  Time spent ordering tests, medications, procedures and referrals: 0 minutes  Time spent Referring and communicating with other healthcare professionals: 0 minutes  Documenting clinical information in Epic: 5 minutes    The total TIME spent on this patient on the day of the appointment was 67 minutes.   ___________________________________________________________________    Review of Electronic Chart, Relevant Records/History: Today I have also reviewed available medical information in the patient's medical record at [facility] (EPIC) and Care Everywhere (if available), including relevant provider notes, laboratory work, and imaging. Please see the [facility] health questionnaire which the patient completed and reviewed with me in detail.     OLEGARIO Yung, JUSTIN, CINDY   [facility]

## 2024-12-25 LAB
DEPRECATED CALCIDIOL+CALCIFEROL SERPL-MC: <35 UG/L (ref 20–75)
VITAMIN D2 SERPL-MCNC: <5 UG/L
VITAMIN D3 SERPL-MCNC: 30 UG/L

## 2024-12-26 NOTE — RESULT ENCOUNTER NOTE
Snehal Maxwell    Your bone density has some loss but no osteoporosis or other abnormal findings to explain the elevated PTH level. Schedule a lab draw in a couple weeks for a recheck. Please let us know if you have any questions.     Take care,    OLEGARIO Eubanks CNP

## 2025-01-06 ENCOUNTER — MYC MEDICAL ADVICE (OUTPATIENT)
Dept: FAMILY MEDICINE | Facility: CLINIC | Age: 65
End: 2025-01-06
Payer: COMMERCIAL

## 2025-01-06 PROBLEM — D46.9 MDS (MYELODYSPLASTIC SYNDROME) (H): Status: RESOLVED | Noted: 2024-12-19 | Resolved: 2025-01-06

## 2025-01-08 ENCOUNTER — LAB (OUTPATIENT)
Dept: LAB | Facility: CLINIC | Age: 65
End: 2025-01-08
Payer: COMMERCIAL

## 2025-01-08 ENCOUNTER — HOSPITAL ENCOUNTER (OUTPATIENT)
Dept: MRI IMAGING | Facility: CLINIC | Age: 65
Discharge: HOME | End: 2025-01-08
Attending: NURSE PRACTITIONER
Payer: COMMERCIAL

## 2025-01-08 DIAGNOSIS — R79.89 ELEVATED PARATHYROID HORMONE: ICD-10-CM

## 2025-01-08 DIAGNOSIS — M54.16 LUMBAR RADICULOPATHY: ICD-10-CM

## 2025-01-08 DIAGNOSIS — R32 BOWEL AND BLADDER INCONTINENCE: ICD-10-CM

## 2025-01-08 DIAGNOSIS — R29.2 ABNORMAL DTR (DEEP TENDON REFLEX): ICD-10-CM

## 2025-01-08 DIAGNOSIS — R20.2 PARESTHESIA OF LEFT UPPER EXTREMITY: ICD-10-CM

## 2025-01-08 DIAGNOSIS — M54.12 CERVICAL RADICULOPATHY: ICD-10-CM

## 2025-01-08 DIAGNOSIS — R15.9 BOWEL AND BLADDER INCONTINENCE: ICD-10-CM

## 2025-01-08 LAB
ANION GAP SERPL CALCULATED.3IONS-SCNC: 9 MMOL/L (ref 7–15)
BUN SERPL-MCNC: 19.1 MG/DL (ref 8–23)
CALCIUM SERPL-MCNC: 9.2 MG/DL (ref 8.8–10.4)
CALCIUM UR-MCNC: 7.2 MG/DL
CALCIUM/CREAT UR: 0.04 G/G CR (ref 0.05–0.27)
CHLORIDE SERPL-SCNC: 104 MMOL/L (ref 98–107)
CREAT SERPL-MCNC: 0.94 MG/DL (ref 0.51–0.95)
CREAT UR-MCNC: 170 MG/DL
EGFRCR SERPLBLD CKD-EPI 2021: 68 ML/MIN/1.73M2
GLUCOSE SERPL-MCNC: 100 MG/DL (ref 70–99)
HCO3 SERPL-SCNC: 26 MMOL/L (ref 22–29)
PHOSPHATE SERPL-MCNC: 3 MG/DL (ref 2.5–4.5)
POTASSIUM SERPL-SCNC: 4.4 MMOL/L (ref 3.4–5.3)
PTH-INTACT SERPL-MCNC: 57 PG/ML (ref 15–65)
SODIUM SERPL-SCNC: 139 MMOL/L (ref 135–145)

## 2025-01-08 PROCEDURE — 82340 ASSAY OF CALCIUM IN URINE: CPT

## 2025-01-08 PROCEDURE — 80048 BASIC METABOLIC PNL TOTAL CA: CPT

## 2025-01-08 PROCEDURE — 72148 MRI LUMBAR SPINE W/O DYE: CPT

## 2025-01-08 PROCEDURE — 84100 ASSAY OF PHOSPHORUS: CPT

## 2025-01-08 PROCEDURE — 83970 ASSAY OF PARATHORMONE: CPT

## 2025-01-08 PROCEDURE — 36415 COLL VENOUS BLD VENIPUNCTURE: CPT

## 2025-01-08 PROCEDURE — 72141 MRI NECK SPINE W/O DYE: CPT

## 2025-01-08 NOTE — RESULT ENCOUNTER NOTE
Snehal Woodruff    Your repeat parathyroid lab is normal. You can skip doing the urine calcium test. Please let us know if you have any questions.     Take care,    OLEGARIO Eubanks CNP

## 2025-01-09 NOTE — RESULT ENCOUNTER NOTE
Snehal Maxwell    Your urine calcium is not elevated. Consistent with the serum calcium level. Must have been a false elevation in the PTH. Please let us know if you have any questions.     Take care,    OLEGARIO Eubanks CNP

## 2025-01-18 DIAGNOSIS — F32.1 MAJOR DEPRESSIVE DISORDER, SINGLE EPISODE, MODERATE (H): ICD-10-CM

## 2025-01-20 RX ORDER — SERTRALINE HYDROCHLORIDE 100 MG/1
200 TABLET, FILM COATED ORAL DAILY
Qty: 180 TABLET | Refills: 4 | Status: SHIPPED | OUTPATIENT
Start: 2025-01-20

## 2025-01-22 ASSESSMENT — PAIN SCALES - PAIN ENJOYMENT GENERAL ACTIVITY SCALE (PEG)
INTERFERED_ENJOYMENT_LIFE: 5
INTERFERED_GENERAL_ACTIVITY: 5
AVG_PAIN_PASTWEEK: 5
PEG_TOTALSCORE: 5

## 2025-01-26 NOTE — PROGRESS NOTES
[facility]         Date of Visit: Jan 27, 2025     CHIEF COMPLAINT:   Chief Complaint   Patient presents with    Pain       Subjective   SUBJECTIVE    INTERVAL HISTORY:   Alissa Crawley is a 64 year old female seen for INITIAL EVALUATION on 12/24/24.  They are returning today for neuropathy, bilateral occipital neuralgia and low back pain.         Recommendations/ Plan at the last visit included:  Visit discussion: Alissa Crawley is a 64 year old female with a past medical history significant for chronic low back pain, recent onset cervical radiculopathy, polyneuralgia, myelodysplastic syndrome and depression who presents with complaints of cervical and lumbar radiculopathy. Patient had a lumbar MRI completed on 8/24/2022.  However, within the past 9 to 12 months she has developed changes in bowel and bladder control with intermittent loss of bowel function.  Physical exam does indicate hyperreflexive DTRs in left lower extremity and left upper extremity.  She does have left upper extremity paresthesias and progressing lower extremity neuropathy.  Because of changes in bowel and bladder control, I recommend lumbar MRI  to rule out red flag concerns such as cauda equina syndrome.  I am recommending physical therapy for cervical radiculopathy and upper extremity paresthesias.  However, there is benefit in obtaining cervical MRI at the same time as her lumbar MRI.  Orders placed for both cervical and lumbar MRI.  Patient was advised that she may need to complete a course of physical therapy before approval of cervical MRI.  However, considering hyper active left upper extremity reflexes, I would advise advancing to cervical MRI sooner.  Patient is hesitant to start any medication with known side effects that can impact mental health status or cause weight gain.  We did discuss the potential benefits of various medication classes and reasons why we would advance to these  "medications.  Also addressed known side effects and individualized response to medications varies.  In the future, we may consider gabapentin for neuropathic pain.  However at this time, we will obtain imaging and review MRI in approximately 4 weeks.  At that time we will make a decision to advance care based on MRI findings.  Patient is advised to follow-up in 4 weeks.  She verbalizes understanding and agreement with plan.     PLAN:    Continue Current Treatment Plan with:   - Existing medications, as prescribed by your current prescribers    New Referrals:   - Physical Therapy:    Referral placed today for evaluation and treatment for strengthening and conditioning of neck pain    New Orders:   - Diagnostic Imaging:  Cervical MRI and Lumbar MRI  at [facility].  Please call [phone] to schedule.    Return to Clinic:   -  30-minute In-Person Appointment with Fiona Thurston, JUSTIN, OLEGARIO, CINDY in 4 weeks, or sooner if needed    Future Considerations:   We will review your MRI results at next visit and determine a plan for treatment.        HPI from initial visit on 12/24/24:   Onset/Progression:   Alissa Crawley is a 64 year old female with history of Myelodysplastic Syndrome, Depression, Cervical Radiculopathy, Myalgia.       Pain Problem #1: Low back pain   Started 3 years ago; will have \"major flare ups\" of pain.  Pain radiates into left leg to knee.  Feels numbness underneath toes of left foot; numbness to left knee.   Muscles in left leg will tighten up; feels stiffness; improves with stretching.   Has neuropathy in right leg from knee to toes; started after fracture of right leg.    Reports bladder urgency; especially when getting out of bed in the morning.   Occasional loss of bowel control over past 12 months.   Denies saddle paresthesia     Pain Problem #2: Neck pain  Neck pain started in October 2024  Bilateral arm pain started 3 years ago   Feels tightness and numbness from neck into " "arms  Neck pain is bilateral; starts at occiput and radiates toward shoulders.   Wakes several times during night because \"my arms fall asleep\"; wakes from bilateral arm pain and notes numbness.      Takes Verapamil for cluster headaches; very helpful         Pain score today: Moderate Pain (5)   Pain Description and Location:        Quality: (Patient-Rptd) sharp, numbness, cutting, throbbing   Duration: (Patient-Rptd) Constant   Severity/Intensity (0 = No pain to 10 = Worst pain imaginable)   Now: (Patient-Rptd) 6, Average: (Patient-Rptd) 7, Best: (Patient-Rptd) 6, Worst: (Patient-Rptd) 9  Aggravating factors include: (Patient-Rptd) lying down, standing, sitting, walking  Relieving factors include: (Patient-Rptd) exercise           Since the last visit, Alissa Crawley reports:   Pain score today: Moderate Pain (4)   Pain rating: intensity ranges from 3/10 to 7/10 on a 0-10 scale.   She is starting physical therapy on 1/28/25 for neck pain.   Low back pain has improved but continues with intermittent bladder/bowel incontinence.   Continues with neck pain and bilateral upper extremity pain and numbness to fingertips.   Headaches intermittently.          REVIEW OF SYSTEMS:   ROS: 10 point ROS neg other than the symptoms noted above in the HPI.     The patient otherwise denies red flag symptoms, such as: thunderclap headache, bowel or bladder incontinence, parasthesias, weakness, saddle anesthesia, unintentional weight loss, or fever/chills/sweats.     Medical History: Any changes in medical history since they were last seen? No    Medications and Allergies reviewed. Yes     Review of [place] Prescription Monitoring Program ():  reviewed on 1/27/25. No concern for abuse or misuse of controlled medications based on this report. Controlled substances prescribed in the past 6 months include: (none)     Current MME: 0 / day    Current PDMP reportable controlled substance medications, if any, are being " prescribed by: none    Primary Care Provider is Melly Roach        Objective    OBJECTIVE:    Physical Exam  Vitals:    01/27/25 0814   BP: 131/85   Pulse: 65        Appearance:   A&O. Patient is appropriate.   Patient is in NAD.      HHENT:  Normocephalic, atraumatic. Eyes without conjunctival injection or jaundice. Neck supple. No obvious neck masses.     Pulmonary:  Normal effort; no cough or audible wheeze      Skin: No obvious rash, lesions, or petechiae of exposed skin.    Neuro: Cranial nerves grossly intact.  Mentation and speech appropriate for age.     Psych:  Alert, without lethargy or stupor. Speech fluent. Appropriate affect. Mood normal. Able to follow commands without difficulty. Normal mood, judgement and behavior.            Diagnostic Tests/ Imaging/ Labs resulted since last visit:       MRI Cervical spine w.o contrast 1/08/25  FINDINGS:   Alignment is significant for multiple subtle grade 1 spondylolisthesis. Bone marrow demonstrates scattered mild degenerative endplate changes. No high-grade marrow edema. No abnormal cord signal. No appreciable extraspinal abnormality.     Craniovertebral junction and C1-C2: Moderate degenerative change without stenosis.     C2-C3: Disc height maintained. Minimal bulge. Mild right and moderate left facet arthropathy. No foraminal or spinal canal stenosis.      C3-C4: Mild disc height loss. Disc osteophyte complex with central protrusion. Mild right and moderate severe left facet arthropathy. No foraminal stenosis. Mild spinal canal stenosis.      C4-C5: Mild disc height loss. Disc osteophyte complex with central protrusion. Mild bilateral facet arthropathy. No foraminal stenosis. Mild spinal canal stenosis.      C5-C6: Mild disc height loss. Disc osteophyte complex. Moderate right and mild left facet arthropathy. Moderate to severe bilateral foraminal stenoses. Mild spinal canal stenosis.     C6-C7: Mild disc height loss. Disc osteophyte complex. Mild  bilateral facet arthropathy. No foraminal stenosis. Mild spinal canal stenosis.      C7-T1: Mild disc height loss. No herniation. Moderate to severe bilateral facet arthropathy. No foraminal or spinal canal stenosis.                                                                      IMPRESSION:  1.  Moderate bilateral foraminal stenoses at C5-C6.  2.  No high-grade spinal canal stenosis.  3.  Other degenerative change as detailed.        MRI Lumbar spine w.o contrast 1/08/25  FINDINGS:   Nomenclature is based on 5 lumbar type vertebral bodies. Mild retrolisthesis of L2 on L3 and L3 on L4. Lumbar vertebral body heights are maintained. Modic type II endplate changes at L1-L2. Normal distal spinal cord with conus medullaris at L1-L2. Thin   fatty filum. Atrophy of the inferior posterior paraspinal musculature. Unremarkable visualized bony pelvis.     T12-L1: Normal disc height and signal. Normal facets. No significant spinal canal or neural foraminal stenosis.      L1-L2: Moderate loss of disc height and signal. Broad disc bulge. Normal facets. No significant spinal canal or neural foraminal stenosis.     L2-L3: Mild loss of disc height and signal. Broad disc bulge with small central disc protrusion. Mild facet hypertrophy. No significant spinal canal stenosis. No significant neural foraminal stenosis.      L3-L4: Normal disc height with mild loss of T2 signal in the disc. Broad disc bulge. Mild facet hypertrophy. Mild narrowing of lateral recesses without significant spinal canal stenosis. No significant neural foraminal stenosis.     L4-L5: Normal disc height and signal. Broad disc bulge. Mild facet hypertrophy. No significant spinal canal stenosis. Minimal right neural foraminal stenosis.     L5-S1: Normal disc height with mild loss of T2 signal in the disc. Broad disc bulge. Mild facet hypertrophy. No significant spinal canal stenosis. No significant neural foraminal stenosis.                                                                       IMPRESSION:  1.  Multilevel lumbar spondylosis, similar to the prior exam, as detailed above. No significant spinal canal or neural foraminal stenosis.          Assessment & Plan      VISIT DIAGNOSIS:   1. Cervical radiculopathy (Primary)  - Adult Pain Clinic Follow-Up Order  - Adult Pain Clinic Follow-Up Order; Future  - PAIN INJECTION EVAL/TREAT/FOLLOW UP; Future    2. Sensorimotor neuropathy  - Adult Pain Clinic Follow-Up Order    3. Painful paresthesia  - Adult Pain Clinic Follow-Up Order    4. Bilateral occipital neuralgia  - Adult Pain Clinic Follow-Up Order    5. Paresthesia of left upper extremity  - Adult Pain Clinic Follow-Up Order  - Adult Pain Clinic Follow-Up Order; Future    6. Lumbar radiculopathy  - Adult Pain Clinic Follow-Up Order  - Adult Pain Clinic Follow-Up Order; Future      ASSESSMENT:   Visit discussion: Alissa Crawley is seen in follow up today at the pain clinic for review of cervical and lumbar MRIs.    Cervical MRI indicates   Multilevel disc osteophyte complex with central protrusion.  She does have significant bilateral foraminal stenosis at C5-6 and mild to moderate stenosis at C4-5.  There is also multilevel facet arthropathy through cervical spine.  Lumbar MRI indicates broad disc bulge with small central protrusion at L2-L3 and mild facet hypertrophy throughout lumbar spine.  At this time, we will focus on cervical radiculopathy as low back pain has come down in the past few weeks.  We discussed potential benefit of epidural steroid injection to address inflammatory process.  Patient may require a surgical consultation in the future.  However, it is appropriate to start with conservative measures.  Order placed for C6-C7 interlaminar epidural steroid injection.  Patient is advised to return to clinic in 6 to 8 weeks to reassess benefit of injection.  She verbalizes understanding and agreement with plan.      PLAN:    Continue Current Treatment  Plan with:   - Existing medications, as prescribed by your Primary Care Provider        New Orders:     -  C6-C7 Epidural Steroid Injection      Return to Clinic:   -  30-minute In-Person Appointment with Fiona Thurston DNP, CINDY MELVIN in 6-8 weeks, or sooner if needed      Future Considerations:   We may consider a surgical spine consultation based on results of the steroid injection.        ___________________________________________________________________    BILLING TIME DOCUMENTATION:   TOTAL TIME includes:   Time spent preparing to see the patient: 3 minutes (reviewing records and tests)  Time spend face to face with the patient: 34 minutes  Time spent ordering tests, medications, procedures and referrals: 0 minutes  Time spent Referring and communicating with other healthcare professionals: 0 minutes  Documenting clinical information in Epic: 3 minutes    The total TIME spent on this patient on the day of the appointment was 40 minutes.     ___________________________________________________________________    Review of Electronic Chart: Today I have also reviewed available medical information in the patient's medical record at [facility] ([facility]) and Care Everywhere (if available), including relevant provider notes, laboratory work, and imaging.     OLEGARIO Yung, JUSTIN, CINDY   [facility]

## 2025-01-27 ENCOUNTER — OFFICE VISIT (OUTPATIENT)
Dept: PALLIATIVE MEDICINE | Facility: CLINIC | Age: 65
End: 2025-01-27
Attending: NURSE PRACTITIONER
Payer: COMMERCIAL

## 2025-01-27 VITALS — HEART RATE: 65 BPM | DIASTOLIC BLOOD PRESSURE: 85 MMHG | SYSTOLIC BLOOD PRESSURE: 131 MMHG

## 2025-01-27 DIAGNOSIS — G62.9 SENSORIMOTOR NEUROPATHY: ICD-10-CM

## 2025-01-27 DIAGNOSIS — M54.12 CERVICAL RADICULOPATHY: Primary | ICD-10-CM

## 2025-01-27 DIAGNOSIS — R20.2 PAINFUL PARESTHESIA: ICD-10-CM

## 2025-01-27 DIAGNOSIS — M54.81 BILATERAL OCCIPITAL NEURALGIA: ICD-10-CM

## 2025-01-27 DIAGNOSIS — M54.16 LUMBAR RADICULOPATHY: ICD-10-CM

## 2025-01-27 DIAGNOSIS — R52 PAINFUL PARESTHESIA: ICD-10-CM

## 2025-01-27 DIAGNOSIS — R20.2 PARESTHESIA OF LEFT UPPER EXTREMITY: ICD-10-CM

## 2025-01-27 PROCEDURE — G2211 COMPLEX E/M VISIT ADD ON: HCPCS | Performed by: NURSE PRACTITIONER

## 2025-01-27 PROCEDURE — 99215 OFFICE O/P EST HI 40 MIN: CPT | Performed by: NURSE PRACTITIONER

## 2025-01-27 ASSESSMENT — PAIN SCALES - GENERAL: PAINLEVEL_OUTOF10: MODERATE PAIN (4)

## 2025-01-27 NOTE — PATIENT INSTRUCTIONS
PATIENT INSTRUCTIONS:     I am recommending a multidisciplinary treatment plan to help this patient better manage her pain. The following recommendations were given to the patient. Diagnosis, treatment options, risks, benefits, and alternatives were discussed; all questions were answered. Self-care instructions were given. The patient expressed understanding of the plan for management.     Continue Current Treatment Plan with:   - Existing medications, as prescribed by your Primary Care Provider        New Orders:     -  C5-C6 Epidural Steroid Injection      Return to Clinic:   -  30-minute In-Person Appointment with Fiona Thurston DNP, OLEGARIO, CINDY in 6-8 weeks, or sooner if needed      Future Considerations:   We may consider a surgical spine consultation based on results of the steroid injection.      ----------------------------------------------------------------  Clinic Number:  [phone]   Call with any questions about your care and for scheduling assistance.   Calls are returned Monday through Friday between 8 AM and 4:30 PM. We usually get back to you within 2 business days depending on the issue/request.    If we are prescribing your medications:  For opioid medication refills, call the clinic or send a [facility] message 7 days in advance.  Please include:  Name of requested medication  Name of the pharmacy.  For non-opioid medications, call your pharmacy directly to request a refill. Please allow 3-4 days to be processed.   Per [place] State Law:  All controlled substance prescriptions must be filled within 30 days of being written.    For those controlled substances allowing refills, pickup must occur within 30 days of last fill.      We believe regular attendance is key to your success in our program!    Any time you are unable to keep your appointment we ask that you call us at least 24 hours in advance to cancel.This will allow us to offer the appointment time to another patient.   Multiple missed  appointments may lead to dismissal from the clinic.

## 2025-01-28 ENCOUNTER — THERAPY VISIT (OUTPATIENT)
Dept: PHYSICAL THERAPY | Facility: CLINIC | Age: 65
End: 2025-01-28
Attending: NURSE PRACTITIONER
Payer: COMMERCIAL

## 2025-01-28 ENCOUNTER — TELEPHONE (OUTPATIENT)
Dept: PALLIATIVE MEDICINE | Facility: CLINIC | Age: 65
End: 2025-01-28

## 2025-01-28 DIAGNOSIS — M54.12 CERVICAL RADICULOPATHY: Primary | ICD-10-CM

## 2025-01-28 DIAGNOSIS — M54.12 CERVICAL RADICULOPATHY: ICD-10-CM

## 2025-01-28 DIAGNOSIS — M54.16 LUMBAR RADICULOPATHY: ICD-10-CM

## 2025-01-28 PROCEDURE — 97110 THERAPEUTIC EXERCISES: CPT | Mod: GP | Performed by: PHYSICAL THERAPIST

## 2025-01-28 PROCEDURE — 97161 PT EVAL LOW COMPLEX 20 MIN: CPT | Mod: GP | Performed by: PHYSICAL THERAPIST

## 2025-01-28 NOTE — TELEPHONE ENCOUNTER
Patients insurance only allows a 2 week approval window.           Please schedule patient and route back with the date to initiate PA.    ** please make sure its at least 2 weeks out**    Emily Galindo   [facility]

## 2025-01-28 NOTE — PROGRESS NOTES
PHYSICAL THERAPY EVALUATION  Type of Visit: Evaluation        Fall Risk Screen:  Fall screen completed by: PT  Have you fallen 2 or more times in the past year?: No  Have you fallen and had an injury in the past year?: No  Is patient a fall risk?: No    Subjective   Pt reports neck pain with stiffness and pain into B shoulders.  Pt reports hands down to fingers can go numb.  Pt reports both hands go numb when she sleeps on her side.  Pt reports R shoulder is particularly sore with lying on R arm.  Pt  reports having stretches that help with pain in her arms.  Pt reports stiffness in neck is a newer symptom since October 2024 and numbness into hands when sleeping is new.  Denies injury in the past year.  Pt reports typing at work can provoke her pain.        Pt reports episodic low back pain.  Pt reports denies injury to low back.  Pt notes episodes last about 4 months.  Pt reports pain starts in low back and radiates B into hips and into L sciatic nerve.  Pt notes her back recently finished an episode of pain and denies pain currently.  Pt notes about 4 months ago she woke up with pain with no injury or repetitive motions.  Pt generally has more pain with sitting, better with lying down.            Presenting condition or subjective complaint: My neck pain causes problems, and it makes my hands tingle.  My back pain episode has decreases at this time.  Date of onset: 01/29/22    Relevant medical history: Anemia; Bladder or bowel problems; Depression; History of fractures; Menopause; Migraines or headaches; Numbness or tingling in perianal area; Overweight; Pain at night or rest; Progressive neurological deficits   Dates & types of surgery: 2020 thao/screwes put in right lower leg because it was broken; gallbladder removed, hysterectomy, bladder sling put in,    Prior diagnostic imaging/testing results: MRI; CT scan; EMG     Prior therapy history for the same diagnosis, illness or injury: No      Living  Environment  Social support: With a significant other or spouse   Type of home: House; 2-story   Stairs to enter the home: No       Ramp: No   Stairs inside the home: Yes 14 Is there a railing: Yes     Help at home: None  Equipment owned: Walker with wheels; Crutches     Employment: Yes   Hobbies/Interests: Water sports: swimming, boating, fishing. Winter sports: snowmobiling, sledding, ice skating    Patient goals for therapy: Sleep through the night    Pain assessment: Pain present     Objective   LUMBAR SPINE EVALUATION  PAIN: Pain Level at Rest: 0/10  Pain Level with Use: 6/10  Pain Location: lumbar spine  Pain Quality: Sharp  Pain Frequency: intermittent  Pain is Worst: daytime  Pain is Exacerbated By: sitting, bending, lifting.   Pain is Relieved By: time  Pain Progression: Improved  INTEGUMENTARY (edema, incisions): WNL  POSTURE: Sitting Posture: Lordosis decreased  GAIT:   Weightbearing Status: WBAT  Assistive Device(s): None  Gait Deviations: WNL  BALANCE/PROPRIOCEPTION:  independent in standing  WEIGHTBEARING ALIGNMENT: WNL  NON-WEIGHTBEARING ALIGNMENT: WNL   ROM: AROM WFL, flexion to mid shin, pain free, extension to 30% pain free.   PELVIC/SI SCREEN: WNL  STRENGTH: WNL  MYOTOMES: WNL  DTR S: WNL  CORD SIGNS: WNL  DERMATOMES: WNL  NEURAL TENSION: Lumbar WNL  FLEXIBILITY:  tight HS.   FUNCTIONAL TESTS: NA  PALPATION: WNL      CERVICAL SPINE EVALUATION  PAIN: Pain Level at Rest: 3/10  Pain Level with Use: 7/10  Pain Location: cervical spine, shoulder, elbow, and wrist  Pain Quality: Burning, Numb, Sharp, and pulling.  Pain Frequency: constant  Pain is Worst: nighttime  Pain is Exacerbated By: lying on side or working at desk.    Pain is Relieved By: rest  Pain Progression: Unchanged  INTEGUMENTARY (edema, incisions): WNL  POSTURE: Sitting Posture: Rounded shoulders, Forward head  ROM: Cervical flexion: full but painful, extension 50, L SB: 25, R SB: 20, R rot: 60, L rot: 70.    MYOTOMES:   weak L shoulder abduction.    DTR S: Brisk upper extremity throughout.    CORD SIGNS: WNL  DERMATOMES:  C5 dermatomal pattern.    NEURAL TENSION:  positive L ULTT.    FLEXIBILITY:  tight upper trap.     SPECIAL TESTS:  negative spurlings, negative distraction.   PALPATION:  Tender B upper trap.       Assessment & Plan   CLINICAL IMPRESSIONS  Medical Diagnosis: Cervical radiculopathy, lumbar radiculopathy    Treatment Diagnosis: Cervical radiculopathy, lumbar radiculopathy   Impression/Assessment: Patient is a 64 year old female with Neck and back complaints.  The following significant findings have been identified: Pain, Decreased ROM/flexibility, Decreased joint mobility, Decreased strength, Decreased proprioception, Impaired muscle performance, Decreased activity tolerance, and Impaired posture. These impairments interfere with their ability to perform work tasks, recreational activities, and driving  as compared to previous level of function.     Clinical Decision Making (Complexity):  Clinical Presentation: Stable/Uncomplicated  Clinical Presentation Rationale: based on medical and personal factors listed in PT evaluation  Clinical Decision Making (Complexity): Low complexity    PLAN OF CARE  Treatment Interventions:  Interventions: Manual Therapy, Neuromuscular Re-education, Therapeutic Activity, Therapeutic Exercise    Long Term Goals     PT Goal 1  Goal Identifier: HEP  Goal Description: Pt will be independent with HEP in order to improve cervical ROM.  Target Date: 03/12/25  PT Goal 2  Goal Identifier: Sleep  Goal Description: Pt will demonstrate ability to modify sleep position to a comfortable position in order to be well rested for the day.  Target Date: 03/26/25      Frequency of Treatment: 1x/week  Duration of Treatment: 8 weeks    Recommended Referrals to Other Professionals: none.  Education Assessment:   Learner/Method: Patient  Education Comments: HEP    Risks and benefits of evaluation/treatment  have been explained.   Patient/Family/caregiver agrees with Plan of Care.     Evaluation Time:     PT Traci, Low Complexity Minutes (85361): 25     Signing Clinician: Kiran Gibbs PT

## 2025-01-28 NOTE — TELEPHONE ENCOUNTER
"Screening Questions for Radiology Injections:    Injection to be done at which interventional clinic site? [facility]    If choosing [facility] for location, please inform patient:  \"[facility] is a Hospital based clinic. Before your visit, you should check with your insurance about how it covers the charges for facility services in a hospital-based clinic.     Procedure ordered by Karena    Procedure ordered? C6-C7 interlaminar GRANT   Transforaminal Cervical JENNIFER - Send to [facility] ([facility]) - No Critical access hospital Site providers perform this procedure    What insurance would patient like us to bill for this procedure? BC  IF SCHEDULING IN [place] [facility] OR SPINE PLEASE SCHEDULE AT LEAST 7-10 BUSINESS DAYS OUT SO A PA CAN BE OBTAINED  Worker's comp or MVA (motor vehicle accident) -Any injection DO NOT SCHEDULE and route to Juan Pablo Vegas.    [email or web address] insurance - For ALL INJECTIONS DO NOT SCHEDULE and route to Emily Raman.     ALL BCBS, [employer] and [employer] - DO NOT SCHEDULE and route to Emily Raman  MEDICA- ALL INJECTIONS- route to Emily Raman    Is patient scheduled at [facility]? no   If YES, route every encounter to [facility]INE CENTER CARE NAVIGATION POOL [[phone]]    Is an  needed? No     Patient has a  home? (Review Grid) YES: ok    Any chance of pregnancy? NO   If YES, do NOT schedule and route to RN pool  - Dr. Scales route to PM&R Nurse  [[id number]]      Is patient actively being treated for cancer or immunocompromised? No  If YES, do NOT schedule and route to RN pool/ Dr. Scales's Team    Does the patient have a bleeding or clotting disorder? No   If YES, okay to schedule AND route to RN nurse / Dr. Scales's Team   (For any patients with platelet count <100, RN must forward to provider)    Is patient taking any Blood Thinners OR Antiplatelet medication?  No   If hold needed, do NOT schedule, route to RN pool/ Dr. Scales's Team  Examples: "   Blood Thinners: (Coumadin, Warfarin, Jantoven, Pradaxa, Xarelto, Eliquis, Edoxaban, Enoxaparin, Lovenox, Heparin, Arixtra, Fondaparinux or Fragmin)  Antiplatelet Medications: (Plavix, Brilinta or Effient)     Is patient taking any aspirin products (includes Excedrin and Fiorinal)? No   If yes route to RN pool/ Dr. Scales's Team - Do not schedule    Is patient taking any GLP-1 Antagonist (hold needed for sedation patients only) No   (semaglutide (Ozempic, Wegovy), dulaglutide (Trulicity), exenatide ER (Bydureon), tirzepatide (Mounjaro), Liraglutide (Saxenda, Victoza), semaglutide (Rybelsus), Terzepatide (Zepbound)  If YES, okay to schedule AND route to RN  / Dr. Scales's Team      Any allergies to contrast dye, iodine, shellfish, or numbing and steroid medications? No  If YES, schedule and add allergy information to appointment notes AND route to the RN pool/ Dr. Scales's Team  If JENNIFER and Contrast Dye / Iodine Allergy? DO NOT SCHEDULE, route to RN pool/ Dr. Scales's Team  Allergies: Patient has no known allergies.     Does patient have an active infection or treated for one within the past week? No  Is patient currently taking any antibiotics or steroid medications?  No   For patients on chronic, preventative, or prophylactic antibiotics, procedures may be scheduled.   For patients on antibiotics for active or recent infection, schedule 4 days after completed.  For patients on steroid medications, schedule 4 days after completed.     Has the patient had a flu shot or any other vaccinations within the past 7 days? No  If yes, explain that for the vaccine to work best they need to:     wait 1 week before and 1 week after getting any Vaccine  wait 1 week before and 2 weeks after getting any Covid Vaccine   If patient has concerns about the timing, send to RN pool/ Dr. Scales's Team    Does patient have an MRI/CT?  YES: mri Include Date and Check Procedure Scheduling Grid to see if required.  Was the MRI/CT done  within the last 3 years?  Yes   If no route to RN Pool/ Dr. Scales's Team  If yes, where was the MRI/CT done? [facility]   Refer to PACS Transmissions list for approved external locations and route to RN Pool High Priority/ Dr. Shankars Team  If MRI was not done at approved external location do NOT schedule and route to RN pool/ Dr. Scales's Team    If patient has an imaging disc, the injection MAY be scheduled but patient must bring disc to appt or appt will be cancelled.    Is patient able to transfer to a procedure table with minimal or no assistance? Yes   If no, do NOT schedule and route to RN Pool/ Dr. Scales's Team    Procedure Specific Instructions:  If celiac plexus block, informed patient NPO for 6 hours and that it is okay to take medications with sips of water, especially blood pressure medications Not Applicable       If this is for a cervical procedure, informed patient that aspirin needs to be held for 6 days.   Not Applicable    Sedation, If Sedation is ordered for any procedure, patient must be NPO for 6 hours prior to procedure Not Applicable    If IV needed:  Do not schedule procedures requiring IV placement in the first appointment of the day or first appointment after lunch. Do NOT schedule at 0745, 0815 or 1245. ok  Instructed patient to arrive 30 minutes early for IV start if required. (Check Procedure Scheduling Grid)  Not Applicable    Reminders:  If you are started on any steroids or antibiotics between now and your appointment, you must contact us because the procedure may need to be cancelled.  Yes    As a reminder, receiving steroids can decrease your body's ability to fight infection.   Would you still like to move forward with scheduling the injection?  Yes    IV Sedation is not provided for procedures. If oral anti-anxiety medication is needed, the patient should request this from their referring provider.    Instruct patient to arrive as directed prior to the scheduled appointment  time:  If IV needed 30 minutes before appointment time     For patients 85 or older we recommend having an adult stay w/ them for the remainder of the day.     If the patient is Diabetic, remind them to bring their glucometer.    Dr. Woodward Pt's - Imaging Orders Needed   Please send all injections to RN Pool NO   Red Flags? NO    Does the patient have any questions?  NO  Taylor Vegas  [facility]

## 2025-01-28 NOTE — TELEPHONE ENCOUNTER
I have attempted to contact this patient by phone with the following results: Pt has been called and she is scheduled with Dr. Card on 02/13. Routing to Emily for review.     Kirsten Adam      [facility]  Pain [facility]

## 2025-01-29 NOTE — TELEPHONE ENCOUNTER
Order ID: [id number]  Authorized      Approval Valid Through:02/07/2025 - 02/20/2025        Emily Galindo   [facility]

## 2025-02-13 ENCOUNTER — RADIOLOGY INJECTION OFFICE VISIT (OUTPATIENT)
Dept: PALLIATIVE MEDICINE | Facility: CLINIC | Age: 65
End: 2025-02-13
Attending: NURSE PRACTITIONER
Payer: COMMERCIAL

## 2025-02-13 VITALS — DIASTOLIC BLOOD PRESSURE: 96 MMHG | SYSTOLIC BLOOD PRESSURE: 155 MMHG | HEART RATE: 61 BPM | OXYGEN SATURATION: 99 %

## 2025-02-13 DIAGNOSIS — M54.12 CERVICAL RADICULOPATHY: ICD-10-CM

## 2025-02-13 RX ORDER — DEXAMETHASONE SODIUM PHOSPHATE 10 MG/ML
10 INJECTION, SOLUTION INTRAMUSCULAR; INTRAVENOUS ONCE
Status: COMPLETED | OUTPATIENT
Start: 2025-02-13 | End: 2025-02-13

## 2025-02-13 RX ADMIN — DEXAMETHASONE SODIUM PHOSPHATE 10 MG: 10 INJECTION, SOLUTION INTRAMUSCULAR; INTRAVENOUS at 20:17

## 2025-02-13 ASSESSMENT — PAIN SCALES - GENERAL
PAINLEVEL_OUTOF10: MILD PAIN (1)
PAINLEVEL_OUTOF10: MILD PAIN (3)

## 2025-02-13 NOTE — NURSING NOTE
Pre-procedure Intake  If YES to any questions or NO to having a   Please complete laminated checklist and leave on the computer keyboard for Provider, verbally inform provider if able.    For SCS Trial, RFA's or any sedation procedure:  Have you been fasting? NA  If yes, for how long?     Are you taking any any blood thinners such as Coumadin, Warfarin, Jantoven, Pradaxa Xarelto, Eliquis, Edoxaban, Enoxaparin, Lovenox, Heparin, Arixtra, Fondaparinux, or Fragmin? OR Antiplatelet medication such as Plavix, Brilinta, or Effient?   No   If yes, when did you take your last dose?     Do you take aspirin?  No  If cervical procedure, have you held aspirin for 6 days?      Is the Pt taking any GLP-1 Antagonist (hold needed for sedation patients only)  (semaglutide (Ozempic, Wegovy), dulaglutide (Trulicity), exenatide ER (Bydureon), tirzepatide (Mounjaro), Liraglutide (Saxenda, Victoza), semaglutide (Rybelsus)     NA  If yes, when did you take your last dose?     Do you have any allergies to contrast dye, iodine, steroid and/or numbing medications?  NO    Are you currently taking antibiotics or have an active infection?  NO    Have you had a fever/elevated temperature within the past week? NO    Are you currently taking oral steroids? NO    Do you have a ? Yes    Are you pregnant or breastfeeding?  NO    Have you received any vaccinations in the last week? NO    Notify provider and RNs if systolic BP >170, diastolic BP >100, P >100 or O2 sats < 90%

## 2025-02-13 NOTE — NURSING NOTE
Discharge Information    IV Discontiued Time:  NA    Amount of Fluid Infused:  NA    Discharge Criteria = When patient returns to baseline or as per MD order    Consciousness:  Pt is fully awake    Circulation:  BP +/- 20% of pre-procedure level    Respiration:  Patient is able to breathe deeply    O2 Sat:  Patient is able to maintain O2 Sat >92% on room air    Activity:  Moves 4 extremities on command    Ambulation:  Patient is able to stand and walk or stand and pivot into wheelchair    Dressing:  Clean/dry or No Dressing    Notes:   Discharge instructions and AVS given to patient    Patient meets criteria for discharge?  YES    Admitted to PCU?  No    Responsible adult present to accompany patient home?  Yes    Signature/Title:    Elena Calderon RN  RN Care Coordinator  [facility] [place]

## 2025-02-13 NOTE — PATIENT INSTRUCTIONS
[facility]   Procedure Discharge Instructions    Today you saw:    Dr. Jl Card,         You had a(n):  Cervical epidural steroid injection    Medications used for cervical epidural: Lidocaine, Omnipaque, Dexamethasone, Bupividcaine, normal saline        Be cautious with activities. Numbness and/or weakness in the upper extremities may occur for up to 6-8 hours after the procedure due to effect of the local anesthetic  Do not drive for 6 hours. The effect of the local anesthetic could slow your reflexes.   You may resume your regular activities after 24 hours  Avoid strenuous activity for the first 24 hours  You may shower, however avoid swimming, tub baths or hot tubs for 24 hours following your procedure  You may have a mild to moderate increase in pain for several days following the injection.  It may take up to 14 days for the steroid medication to start working although you may feel the effect as early as a few days after the procedure.     You may use ice packs for 10-15 minutes, 3 to 4 times a day at the injection site for comfort  Do not use heat to painful areas for 6 to 8 hours. This will give the local anesthetic time to wear off and prevent you from accidentally burning your skin.   Unless you have been directed to avoid the use of anti-inflammatory medications (NSAIDS), you may use medications such as ibuprofen, Aleve or Tylenol for pain control if needed.   If you have diabetes, check your blood sugar more frequently than usual as your blood sugar may be higher than normal for 10-14 days following a steroid injection. Contact your doctor who manages your diabetes if your blood sugar is higher than usual  Possible side effects of steroids that you may experience include flushing, elevated blood pressure, increased appetite, mild headaches and restlessness.  All of these symptoms will get better with time.  If you experience any of the following, call the Pain Clinic  during work hours (Mon-Friday 8-4:30 pm) at [phone] or the Provider Line after hours at [phone]:  -Fever over 100 degree F  -Swelling, bleeding, redness, drainage, warmth at the injection site  -Progressive weakness or numbness in your arms  -Loss of bowel or bladder function  -Unusual headache not relieved by Tylenol or other pain reliever  -Unusual new onset of pain that is not improving

## 2025-02-14 NOTE — PROGRESS NOTES
[facility] - Procedure Note    Date of Visit: 2/13/2025    Procedure performed: C7-T1 interlaminar epidural steroid injection with fluoroscopic guidance  Diagnosis: Cervical radiculitis/radiculopathy  : Jl Card MD  Anesthesia: none  Complication:  Multiple attempts were made at C6-7 with vascular spread at multiple locations. Ultimately switched to C7-T1    Indications: Alissa Crawley is a 64 year old female who is seen  for cervical epidural steroid injection. The patient describes neck pain rad to her ue. The patient has been exhibiting symptoms consistent with cervical intraspinal inflammation and radiculopathy. Symptoms have been persistent, disabling, and intermittently severe. The patient reports minimal improvement with conservative treatment, includi meds/pt.    Cervical MRI reviewed    Allergies:    No Known Allergies     Vitals:  BP (!) 155/96   Pulse 61   SpO2 99%     Review of Systems: The patient denies recent fever, chills, illness, use of antibiotics or anticoagulants. All other 10-point review of systems negative.     Procedure: The procedure and risks were explained, and informed written consent was obtained from the patient. Risks include but are not limited to: infection, bleeding, increased pain, and damage to soft tissue, nerve, muscle, and vasculature structures. After getting informed consent, patient was brought into the procedure suite and was placed in a prone position on the procedure table. A Pause for the Cause was performed. Patient was prepped and draped in sterile fashion.     The C7-T1 interspace was identified with use of fluoroscopy in AP view. A 25-gauge, 1.5 inch needle was used to anesthetize the skin and subcutaneous tissue entry site with a total of 2 ml of 1% lidocaine. Under fluoroscopic visualization, a 22-gauge, 3.5 inch Tuohy epidural needle was slowly advanced towards the epidural space a few millimeters midline of midline.  The latter part of the needle advancement was guided with fluoroscopy in the lateral view. The epidural space was identified using loss of resistance technique. After negative aspiration for heme and cerebrospinal fluid, a total of 1 mL of Omnipaque was injected to confirm needle placement. 9 mL of contrast was wasted. Epidurogram confirmed spread within the posterior epidural space. 2 ml of  NS,1 ml 10mg/ml of dexamethasone, and 1 ml of preservative free 0.25% bupivacaine was injected. The needle was removed.  Images were saved to PACS.    The patient tolerated the procedure well, and there was no evidence of procedural complications. No new sensory or motor deficits were noted following the procedure. The patient was stable and able to ambulate on discharge home. Post-procedure instructions were provided.     Pre-procedure pain score: 3/10 in the neck, 3/10 in the arm  Post-procedure pain score: 1/10 in the neck, 1/10 in the arm    Assessment/Plan: Alissa Crawley is a 64 year old female s/p cervical interlaminar epidural steroid injection today for cervical spondylosis and radiculitis/radiculopathy.     Following today's procedure, the patient was advised to contact the [facility] for any of the following:   Fever, chills, or night sweats   New onset of pain, numbness, or weakness   Any questions/concerns regarding the procedure  If unable to contact the Pain Center, the patient was instructed to go to a local Emergency Room for any complications.     Follow-up with provider in 2 weeks for post-procedure evaluation.    Jl Card MD   Pain Management    [facility]

## 2025-03-07 ASSESSMENT — PAIN SCALES - PAIN ENJOYMENT GENERAL ACTIVITY SCALE (PEG)
PEG_TOTALSCORE: 1.33
AVG_PAIN_PASTWEEK: 1
INTERFERED_ENJOYMENT_LIFE: 2
INTERFERED_GENERAL_ACTIVITY: 1

## 2025-03-10 NOTE — PROGRESS NOTES
[facility]         Date of Visit: Mar 11, 2025     CHIEF COMPLAINT:   Chief Complaint   Patient presents with    Pain       Subjective   SUBJECTIVE    INTERVAL HISTORY:   Alissa Crawley is a 64 year old female seen for INITIAL EVALUATION on 12/24/24; last seen for FOLLOW UP on 1/27/25.  They are returning today for neuropathy, bilateral occipital neuralgia and low back pain.         Recommendations/ Plan at the last visit included:  Visit discussion: Alissa Crawley is seen in follow up today at the pain clinic for review of cervical and lumbar MRIs.    Cervical MRI indicates   Multilevel disc osteophyte complex with central protrusion.  She does have significant bilateral foraminal stenosis at C5-6 and mild to moderate stenosis at C4-5.  There is also multilevel facet arthropathy through cervical spine.  Lumbar MRI indicates broad disc bulge with small central protrusion at L2-L3 and mild facet hypertrophy throughout lumbar spine.  At this time, we will focus on cervical radiculopathy as low back pain has come down in the past few weeks.  We discussed potential benefit of epidural steroid injection to address inflammatory process.  Patient may require a surgical consultation in the future.  However, it is appropriate to start with conservative measures.  Order placed for C6-C7 interlaminar epidural steroid injection.  Patient is advised to return to clinic in 6 to 8 weeks to reassess benefit of injection.  She verbalizes understanding and agreement with plan.      PLAN:    Continue Current Treatment Plan with:   - Existing medications, as prescribed by your Primary Care Provider      New Orders:     -  C6-C7 Epidural Steroid Injection    Return to Clinic:   -  30-minute In-Person Appointment with Fiona Thurston, JUSTIN, APRN, FNP-C in 6-8 weeks, or sooner if needed    Future Considerations:   We may consider a surgical spine consultation based on results of the steroid injection.   "      Interval history from last visit on 1/27/25:   Pain score today: Moderate Pain (4)   Pain rating: intensity ranges from 3/10 to 7/10 on a 0-10 scale.   She is starting physical therapy on 1/28/25 for neck pain.   Low back pain has improved but continues with intermittent bladder/bowel incontinence.   Continues with neck pain and bilateral upper extremity pain and numbness to fingertips.   Headaches intermittently.          Since the last visit, Alissa Crawley reports:   Pain score today: Mild Pain (1)   Pain rating: intensity ranges from 0/10 to 5/10  on a 0-10 scale.   Alissa underwent C7-T1 IL GRANT on 2/13/25 and experienced more than 75% improvement in neck pain and upper extremity pain/numbness.     Today, she reports \"it worked\" in regards to GRANT.    Lower back pain has increased in the 10 days.   More sitting at work with several meetings day to day; contributes to low back pain.   Pain is midline at waistband.   Standing for periods of time increases neuropathy pain in right leg.         REVIEW OF SYSTEMS:   ROS: 10 point ROS neg other than the symptoms noted above in the HPI.     The patient otherwise denies red flag symptoms, such as: thunderclap headache, bowel or bladder incontinence, parasthesias, weakness, saddle anesthesia, unintentional weight loss, or fever/chills/sweats.     Medical History: Any changes in medical history since they were last seen? No    Medications and Allergies reviewed. Yes     Review of [place] Prescription Monitoring Program ():  reviewed on 3/11/25. No concern for abuse or misuse of controlled medications based on this report. Controlled substances prescribed in the past 6 months include: (none)     Current MME: 0 / day    Current PDMP reportable controlled substance medications, if any, are being prescribed by: none   Primary Care Provider is Melly Roach            Objective    OBJECTIVE:    Physical Exam  Vitals:    03/11/25 0757   BP: 134/88 "   Pulse: 64   SpO2: 100%        Appearance:   A&O. Patient is appropriate.   Patient is in NAD.      HHENT:  Normocephalic, atraumatic. Eyes without conjunctival injection or jaundice. Neck supple. No obvious neck masses.     Pulmonary:  Normal effort; no cough or audible wheeze      Skin: No obvious rash, lesions, or petechiae of exposed skin.    Neuro: Cranial nerves grossly intact.  Mentation and speech appropriate for age.     Psych:  Alert, without lethargy or stupor. Speech fluent. Appropriate affect. Mood normal. Able to follow commands without difficulty. Normal mood, judgement and behavior.        Gait pattern:   Patient has an antalgic gait pattern:NO  Gait favors the neither side.  Mobility and/or assistive devices? NO          Diagnostic Tests/ Imaging/ Labs resulted since last visit:   None       Assessment & Plan      VISIT DIAGNOSIS:   1. Chronic bilateral low back pain without sciatica (Primary)  - celecoxib (CELEBREX) 50 MG capsule; Take 1 capsule (50 mg) by mouth 2 times daily.  Dispense: 60 capsule; Refill: 0  - Adult Pain Clinic Follow-Up Order; Future    2. Lumbar radiculopathy  - Adult Pain Clinic Follow-Up Order  - Physical Therapy  Referral; Future  - Adult Pain Clinic Follow-Up Order; Future    3. Paresthesia of left upper extremity  - Adult Pain Clinic Follow-Up Order  - Adult Pain Clinic Follow-Up Order; Future    4. Cervical radiculopathy  - Adult Pain Clinic Follow-Up Order  - Adult Pain Clinic Follow-Up Order; Future      ASSESSMENT:   Visit discussion: Alissa Crawley is seen in follow up today at the pain clinic for cervical radiculopathy and low back pain.  Patient noted significant improvement in neck and upper extremity pain after C7-T1 GRANT.  She states lower back pain has worsened over the past few weeks.  We discussed potential benefit of LESI versus engaging in physical therapy for low back strengthening and conditioning.  Patient would like to move forward with  physical therapy next.  She does find some improvement with ibuprofen but states that it is very difficult on her stomach.  We can trial Celebrex 50 mg twice daily for anti-inflammatory benefits.  Patient is advised to follow-up in 8 weeks to reassess low back pain after physical therapy.  If no improvement we may consider L2-L3 LESI in the future.  Patient verbalizes understanding and agreement with plan.      PLAN:    Medication Management:   - START taking Celecoxib 50 mg - take 1 tab by mouth twice per day as needed for pain       Continue Current Treatment Plan with:   - Existing medications, as prescribed by Primary Care       New Referrals:   - Physical Therapy:    Referral placed today for evaluation and treatment for strengthening and conditioning of low back pain      Return to Clinic:   -  30-minute In-Person Appointment with Fiona Thurston, JUSTIN, OLEGARIO, CINDY in 8 weeks, or sooner if needed      Future Considerations:   If physical therapy and chiropractic care do not improve low back pain, we may consider L2-L3 lumbar epidural steroid injection.        ___________________________________________________________________    BILLING TIME DOCUMENTATION:   TOTAL TIME includes:   Time spent preparing to see the patient: 5 minutes (reviewing records and tests)  Time spend face to face with the patient: 14 minutes  Time spent ordering tests, medications, procedures and referrals: 0 minutes  Time spent Referring and communicating with other healthcare professionals: 0 minutes  Documenting clinical information in Epic: 3 minutes    The total TIME spent on this patient on the day of the appointment was 22 minutes.     ___________________________________________________________________    Review of Electronic Chart: Today I have also reviewed available medical information in the patient's medical record at [facility] ([facility]) and Care Everywhere (if available), including relevant provider notes, laboratory  work, and imaging.     OLEGARIO Yung, JUSTIN, FNP-C   [facility]

## 2025-03-11 ENCOUNTER — OFFICE VISIT (OUTPATIENT)
Dept: PALLIATIVE MEDICINE | Facility: CLINIC | Age: 65
End: 2025-03-11
Attending: NURSE PRACTITIONER
Payer: COMMERCIAL

## 2025-03-11 VITALS — HEART RATE: 64 BPM | SYSTOLIC BLOOD PRESSURE: 134 MMHG | OXYGEN SATURATION: 100 % | DIASTOLIC BLOOD PRESSURE: 88 MMHG

## 2025-03-11 DIAGNOSIS — M54.12 CERVICAL RADICULOPATHY: ICD-10-CM

## 2025-03-11 DIAGNOSIS — M54.50 CHRONIC BILATERAL LOW BACK PAIN WITHOUT SCIATICA: Primary | ICD-10-CM

## 2025-03-11 DIAGNOSIS — R20.2 PARESTHESIA OF LEFT UPPER EXTREMITY: ICD-10-CM

## 2025-03-11 DIAGNOSIS — M54.16 LUMBAR RADICULOPATHY: ICD-10-CM

## 2025-03-11 DIAGNOSIS — G89.29 CHRONIC BILATERAL LOW BACK PAIN WITHOUT SCIATICA: Primary | ICD-10-CM

## 2025-03-11 PROCEDURE — 1125F AMNT PAIN NOTED PAIN PRSNT: CPT | Performed by: NURSE PRACTITIONER

## 2025-03-11 PROCEDURE — 3079F DIAST BP 80-89 MM HG: CPT | Performed by: NURSE PRACTITIONER

## 2025-03-11 PROCEDURE — 3075F SYST BP GE 130 - 139MM HG: CPT | Performed by: NURSE PRACTITIONER

## 2025-03-11 PROCEDURE — 99213 OFFICE O/P EST LOW 20 MIN: CPT | Performed by: NURSE PRACTITIONER

## 2025-03-11 RX ORDER — CELECOXIB 50 MG/1
50 CAPSULE ORAL 2 TIMES DAILY
Qty: 60 CAPSULE | Refills: 0 | Status: SHIPPED | OUTPATIENT
Start: 2025-03-11

## 2025-03-11 ASSESSMENT — PAIN SCALES - GENERAL: PAINLEVEL_OUTOF10: MILD PAIN (1)

## 2025-03-11 NOTE — PATIENT INSTRUCTIONS
PATIENT INSTRUCTIONS:     I am recommending a multidisciplinary treatment plan to help this patient better manage her pain. The following recommendations were given to the patient. Diagnosis, treatment options, risks, benefits, and alternatives were discussed; all questions were answered. Self-care instructions were given. The patient expressed understanding of the plan for management.   Medication Management:   - START taking Celecoxib 50 mg - take 1 tab by mouth twice per day as needed for pain       Continue Current Treatment Plan with:   - Existing medications, as prescribed by Primary Care       New Referrals:   - Physical Therapy:    Referral placed today for evaluation and treatment for strengthening and conditioning of low back pain      Return to Clinic:   -  30-minute In-Person Appointment with Fiona Thurston, JUSTIN, OLEGARIO, KAYC in 8 weeks, or sooner if needed      Future Considerations:   If physical therapy and chiropractic care do not improve low back pain, we may consider L2-L3 lumbar epidural steroid injection.      ----------------------------------------------------------------  Clinic Number:  [phone]   Call with any questions about your care and for scheduling assistance.   Calls are returned Monday through Friday between 8 AM and 4:30 PM. We usually get back to you within 2 business days depending on the issue/request.    If we are prescribing your medications:  For opioid medication refills, call the clinic or send a ulike message 7 days in advance.  Please include:  Name of requested medication  Name of the pharmacy.  For non-opioid medications, call your pharmacy directly to request a refill. Please allow 3-4 days to be processed.   Per [place] State Law:  All controlled substance prescriptions must be filled within 30 days of being written.    For those controlled substances allowing refills, pickup must occur within 30 days of last fill.      We believe regular attendance is key to your  success in our program!    Any time you are unable to keep your appointment we ask that you call us at least 24 hours in advance to cancel.This will allow us to offer the appointment time to another patient.   Multiple missed appointments may lead to dismissal from the clinic.

## 2025-04-02 ASSESSMENT — PATIENT HEALTH QUESTIONNAIRE - PHQ9
10. IF YOU CHECKED OFF ANY PROBLEMS, HOW DIFFICULT HAVE THESE PROBLEMS MADE IT FOR YOU TO DO YOUR WORK, TAKE CARE OF THINGS AT HOME, OR GET ALONG WITH OTHER PEOPLE: VERY DIFFICULT
SUM OF ALL RESPONSES TO PHQ QUESTIONS 1-9: 7
SUM OF ALL RESPONSES TO PHQ QUESTIONS 1-9: 7

## 2025-04-03 ENCOUNTER — ANCILLARY PROCEDURE (OUTPATIENT)
Dept: GENERAL RADIOLOGY | Facility: CLINIC | Age: 65
End: 2025-04-03
Attending: NURSE PRACTITIONER
Payer: COMMERCIAL

## 2025-04-03 ENCOUNTER — OFFICE VISIT (OUTPATIENT)
Dept: FAMILY MEDICINE | Facility: CLINIC | Age: 65
End: 2025-04-03
Payer: COMMERCIAL

## 2025-04-03 VITALS
TEMPERATURE: 97.7 F | HEART RATE: 80 BPM | HEIGHT: 69 IN | DIASTOLIC BLOOD PRESSURE: 88 MMHG | SYSTOLIC BLOOD PRESSURE: 136 MMHG | OXYGEN SATURATION: 97 % | WEIGHT: 207.8 LBS | BODY MASS INDEX: 30.78 KG/M2 | RESPIRATION RATE: 16 BRPM

## 2025-04-03 DIAGNOSIS — M25.511 ACUTE PAIN OF RIGHT SHOULDER: Primary | ICD-10-CM

## 2025-04-03 DIAGNOSIS — M25.511 ACUTE PAIN OF RIGHT SHOULDER: ICD-10-CM

## 2025-04-03 NOTE — RESULT ENCOUNTER NOTE
Snehal Maxwell    Your shoulder has mild arthritis. No other abnormal findings. Please let us know if you have any questions.     Take care,    OLEGARIO Eubanks CNP

## 2025-04-03 NOTE — PROGRESS NOTES
"  Assessment & Plan     Acute pain of right shoulder  Bursitis vs arthritis. Radiology  report pending. Will have her do antiinflammatories and exercises for 2 weeks. If  not improving instructed to follow up with [facility]/ [facility]. Follow up sooner for new or worsening symptoms. See patient instructions.     - XR Shoulder Right G/E 3 Views; Future      BMI  Estimated body mass index is 30.69 kg/m  as calculated from the following:    Height as of this encounter: 1.753 m (5' 9\").    Weight as of this encounter: 94.3 kg (207 lb 12.8 oz).         CONSULTATION/REFERRAL to ORTHO if not improving    Subjective   Kacy is a 64 year old, presenting for the following health issues:  Shoulder Pain        4/3/2025     7:41 AM   Additional Questions   Roomed by Yamilka VILLELA     History of Present Illness       Reason for visit:  Shoulder pain-no injury  Symptom onset:  More than a month  Symptoms include:  Sharp and burning pain in the right shoulder especially when lying on that side.  Symptom intensity:  Moderate  Symptom progression:  Worsening  Had these symptoms before:  No  What makes it worse:  Lying on that side and rubbing the area    She eats 0-1 servings of fruits and vegetables daily.She consumes 0 sweetened beverage(s) daily.She exercises with enough effort to increase her heart rate 9 or less minutes per day.  She exercises with enough effort to increase her heart rate 3 or less days per week.   She is taking medications regularly.            Review of Systems  Constitutional, HEENT, cardiovascular, pulmonary, gi and gu systems are negative, except as otherwise noted.      Objective    /88   Pulse 80   Temp 97.7  F (36.5  C) (Tympanic)   Resp 16   Ht 1.753 m (5' 9\")   Wt 94.3 kg (207 lb 12.8 oz)   SpO2 97%   BMI 30.69 kg/m    Body mass index is 30.69 kg/m .  Physical Exam   GENERAL: alert and no distress  ORTHO:   SHOULDER Exam-Right   Inspection: swelling- no , redness- no , bruising- no , asymmetry- no, " distal clavicle elevation- no, muscle atrophy- none and scapular winging- no   Tenderness of: clavicle(prox-mid)- no , clavicle-(mid-distal)- no , AC joint- YES, prox bicep tendon- no    Range of Motion: Active- forward flexion- normal, abduction- normal, external rotation- normal, internal rotation- normal  Range of Motion: Passive- not examined   Strength: forward flexion- 5/5, abduction- 5/5, internal rotation- 5/5, external rotation- 5/5 and bicep- full   Special tests: cross arm adduction- negative and empty can- negative        NEURO: Normal strength and tone, mentation intact and speech normal            Signed Electronically by: OLEGARIO Bolaños CNP

## 2025-04-14 ENCOUNTER — MYC MEDICAL ADVICE (OUTPATIENT)
Dept: PALLIATIVE MEDICINE | Facility: CLINIC | Age: 65
End: 2025-04-14
Payer: COMMERCIAL

## 2025-04-14 DIAGNOSIS — G89.29 CHRONIC BILATERAL LOW BACK PAIN WITHOUT SCIATICA: ICD-10-CM

## 2025-04-14 DIAGNOSIS — M54.50 CHRONIC BILATERAL LOW BACK PAIN WITHOUT SCIATICA: ICD-10-CM

## 2025-04-14 NOTE — TELEPHONE ENCOUNTER
Patient requesting refill of  Celecoxib.    Message sent to patient to identify pharmacy preference.     Routing to [facility] for prep.

## 2025-04-15 RX ORDER — CELECOXIB 50 MG/1
50 CAPSULE ORAL 2 TIMES DAILY
Qty: 60 CAPSULE | Refills: 1 | Status: SHIPPED | OUTPATIENT
Start: 2025-04-15

## 2025-04-15 NOTE — TELEPHONE ENCOUNTER
4/15/2025 3:34 PM     REFILL REQUEST:     This is a patient of mine. PDMP and Chart have been reviewed; refill request is appropriate.    Refilled for 60 day supply.  Script e-Prescribed to pharmacy    OLEGARIO Yung, JUSTIN, FNP-C

## 2025-04-15 NOTE — TELEPHONE ENCOUNTER
Received fax from pharmacy requesting refill(s) for     celecoxib (CELEBREX) 50 MG capsule    Date last filled 3/11/2025    Last Appt Date:3/11/2025    Next Appt scheduled: None on File    Pharmacy:   [facility] - [place] - [id number] [address] [facility] route for processing    [facility]

## 2025-05-29 ENCOUNTER — PATIENT OUTREACH (OUTPATIENT)
Dept: CARE COORDINATION | Facility: CLINIC | Age: 65
End: 2025-05-29
Payer: COMMERCIAL

## 2025-05-31 DIAGNOSIS — N95.2 ATROPHIC VAGINITIS: ICD-10-CM

## 2025-06-02 RX ORDER — ESTRADIOL 1 MG/1
1 TABLET ORAL DAILY
Qty: 90 TABLET | Refills: 2 | Status: SHIPPED | OUTPATIENT
Start: 2025-06-02

## 2025-06-09 DIAGNOSIS — M54.50 CHRONIC BILATERAL LOW BACK PAIN WITHOUT SCIATICA: ICD-10-CM

## 2025-06-09 DIAGNOSIS — G89.29 CHRONIC BILATERAL LOW BACK PAIN WITHOUT SCIATICA: ICD-10-CM

## 2025-06-09 RX ORDER — CELECOXIB 50 MG/1
50 CAPSULE ORAL 2 TIMES DAILY
Qty: 60 CAPSULE | Refills: 0 | Status: SHIPPED | OUTPATIENT
Start: 2025-06-09

## 2025-06-09 NOTE — TELEPHONE ENCOUNTER
Received fax from pharmacy requesting refill(s) for     celecoxib (CELEBREX) 50 MG capsule    Date last filled 5/12/2025    Last Appt Date:3/11/2025    Next Appt scheduled: None on File    Pharmacy:   [facility] - [place] - [phone] [address] [facility] route for processing    [facility]

## 2025-06-09 NOTE — TELEPHONE ENCOUNTER
6/9/2025 5:29 PM     REFILL REQUEST:     This is a patient of mine. PDMP and Chart have been reviewed; refill request is appropriate.    Refilled for 30 day supply.  Script e-Prescribed to pharmacy    OLEGARIO Yung, JUSTIN, FNP-C

## 2025-06-12 ENCOUNTER — PATIENT OUTREACH (OUTPATIENT)
Dept: CARE COORDINATION | Facility: CLINIC | Age: 65
End: 2025-06-12
Payer: COMMERCIAL

## 2025-07-02 DIAGNOSIS — G89.29 CHRONIC BILATERAL LOW BACK PAIN WITHOUT SCIATICA: ICD-10-CM

## 2025-07-02 DIAGNOSIS — M54.50 CHRONIC BILATERAL LOW BACK PAIN WITHOUT SCIATICA: ICD-10-CM

## 2025-07-02 RX ORDER — CELECOXIB 50 MG/1
50 CAPSULE ORAL 2 TIMES DAILY
Qty: 60 CAPSULE | Refills: 0 | Status: SHIPPED | OUTPATIENT
Start: 2025-07-02

## 2025-07-02 NOTE — TELEPHONE ENCOUNTER
7/2/2025 2:22 PM     REFILL REQUEST:     This is a patient of mine. PDMP and Chart have been reviewed; refill request is appropriate.    Refilled for 30 day supply.  Script e-Prescribed to pharmacy    OLEGARIO Yung, JUSTIN, FNP-C

## 2025-07-02 NOTE — TELEPHONE ENCOUNTER
Received fax from pharmacy requesting refill(s) for     celecoxib (CELEBREX) 50 MG capsule    Date last filled:  6/9/2025    Last Appt Date:  3/11/2025    Next Appt scheduled:  None on File    Pharmacy:   [facility] - [place] - [id number] [address] route for processing    Pam Martin [facility]

## 2025-07-30 DIAGNOSIS — G89.29 CHRONIC BILATERAL LOW BACK PAIN WITHOUT SCIATICA: ICD-10-CM

## 2025-07-30 DIAGNOSIS — M54.50 CHRONIC BILATERAL LOW BACK PAIN WITHOUT SCIATICA: ICD-10-CM

## 2025-07-30 RX ORDER — CELECOXIB 50 MG/1
50 CAPSULE ORAL 2 TIMES DAILY
Qty: 60 CAPSULE | Refills: 0 | Status: SHIPPED | OUTPATIENT
Start: 2025-07-30

## 2025-07-30 NOTE — TELEPHONE ENCOUNTER
Signed Prescriptions:                        Disp   Refills    celecoxib (CELEBREX) 50 MG capsule         60 cap*0        Sig: Take 1 capsule (50 mg) by mouth 2 times daily. Needs           follow up visit for future refill  Authorizing Provider: ANA SINGH      Reviewed renal function, last checked January 2025 and was WNL    Signed for colleague who is out of office. Refill(s) appear consistent with ongoing management.     Ana Singh APRN, RN CNP, FNP  [facility]  [facility]

## 2025-07-30 NOTE — TELEPHONE ENCOUNTER
Received fax from pharmacy requesting refill(s) for     celecoxib (CELEBREX) 50 MG capsule    Date last filled:  7/5/2025    Last Appt Date:  3/11/2025    Next Appt scheduled:  None on File    Pharmacy:   [facility] - [place] - [phone] [address] [facility] route for processing    Pam Martin [facility]

## 2025-08-02 ENCOUNTER — HEALTH MAINTENANCE LETTER (OUTPATIENT)
Age: 65
End: 2025-08-02

## 2025-08-05 ENCOUNTER — OFFICE VISIT (OUTPATIENT)
Dept: FAMILY MEDICINE | Facility: CLINIC | Age: 65
End: 2025-08-05
Payer: COMMERCIAL

## 2025-08-05 VITALS
HEIGHT: 69 IN | SYSTOLIC BLOOD PRESSURE: 106 MMHG | TEMPERATURE: 97.2 F | HEART RATE: 68 BPM | RESPIRATION RATE: 16 BRPM | DIASTOLIC BLOOD PRESSURE: 78 MMHG | BODY MASS INDEX: 29.77 KG/M2 | OXYGEN SATURATION: 99 % | WEIGHT: 201 LBS

## 2025-08-05 DIAGNOSIS — R10.84 ABDOMINAL PAIN, GENERALIZED: Primary | ICD-10-CM

## 2025-08-05 DIAGNOSIS — R19.7 DIARRHEA, UNSPECIFIED TYPE: ICD-10-CM

## 2025-08-05 DIAGNOSIS — Z86.19 HISTORY OF HELICOBACTER PYLORI INFECTION: ICD-10-CM

## 2025-08-05 LAB
ALBUMIN SERPL BCG-MCNC: 4.3 G/DL (ref 3.5–5.2)
ALP SERPL-CCNC: 52 U/L (ref 40–150)
ALT SERPL W P-5'-P-CCNC: 15 U/L (ref 0–50)
ANION GAP SERPL CALCULATED.3IONS-SCNC: 9 MMOL/L (ref 7–15)
AST SERPL W P-5'-P-CCNC: 22 U/L (ref 0–45)
BASOPHILS # BLD AUTO: 0 10E3/UL (ref 0–0.2)
BASOPHILS NFR BLD AUTO: 0 %
BILIRUB SERPL-MCNC: 0.4 MG/DL
BUN SERPL-MCNC: 19.7 MG/DL (ref 8–23)
CALCIUM SERPL-MCNC: 9.5 MG/DL (ref 8.8–10.4)
CHLORIDE SERPL-SCNC: 104 MMOL/L (ref 98–107)
CREAT SERPL-MCNC: 1.03 MG/DL (ref 0.51–0.95)
CRP SERPL-MCNC: <3 MG/L
EGFRCR SERPLBLD CKD-EPI 2021: 61 ML/MIN/1.73M2
EOSINOPHIL # BLD AUTO: 0.1 10E3/UL (ref 0–0.7)
EOSINOPHIL NFR BLD AUTO: 1 %
ERYTHROCYTE [DISTWIDTH] IN BLOOD BY AUTOMATED COUNT: 12.4 % (ref 10–15)
GLUCOSE SERPL-MCNC: 101 MG/DL (ref 70–99)
HCO3 SERPL-SCNC: 26 MMOL/L (ref 22–29)
HCT VFR BLD AUTO: 37.2 % (ref 35–47)
HGB BLD-MCNC: 12.4 G/DL (ref 11.7–15.7)
IMM GRANULOCYTES # BLD: 0 10E3/UL
IMM GRANULOCYTES NFR BLD: 0 %
LIPASE SERPL-CCNC: 25 U/L (ref 13–60)
LYMPHOCYTES # BLD AUTO: 1.5 10E3/UL (ref 0.8–5.3)
LYMPHOCYTES NFR BLD AUTO: 36 %
MCH RBC QN AUTO: 34.8 PG (ref 26.5–33)
MCHC RBC AUTO-ENTMCNC: 33.3 G/DL (ref 31.5–36.5)
MCV RBC AUTO: 105 FL (ref 78–100)
MONOCYTES # BLD AUTO: 0.4 10E3/UL (ref 0–1.3)
MONOCYTES NFR BLD AUTO: 10 %
NEUTROPHILS # BLD AUTO: 2.3 10E3/UL (ref 1.6–8.3)
NEUTROPHILS NFR BLD AUTO: 53 %
PLATELET # BLD AUTO: 207 10E3/UL (ref 150–450)
POTASSIUM SERPL-SCNC: 5.2 MMOL/L (ref 3.4–5.3)
PROT SERPL-MCNC: 7.3 G/DL (ref 6.4–8.3)
RBC # BLD AUTO: 3.56 10E6/UL (ref 3.8–5.2)
SODIUM SERPL-SCNC: 139 MMOL/L (ref 135–145)
WBC # BLD AUTO: 4.3 10E3/UL (ref 4–11)

## 2025-08-05 PROCEDURE — 83690 ASSAY OF LIPASE: CPT | Performed by: NURSE PRACTITIONER

## 2025-08-05 PROCEDURE — 85025 COMPLETE CBC W/AUTO DIFF WBC: CPT | Performed by: NURSE PRACTITIONER

## 2025-08-05 PROCEDURE — 80053 COMPREHEN METABOLIC PANEL: CPT | Performed by: NURSE PRACTITIONER

## 2025-08-05 PROCEDURE — 3074F SYST BP LT 130 MM HG: CPT | Performed by: NURSE PRACTITIONER

## 2025-08-05 PROCEDURE — 86140 C-REACTIVE PROTEIN: CPT | Performed by: NURSE PRACTITIONER

## 2025-08-05 PROCEDURE — 99213 OFFICE O/P EST LOW 20 MIN: CPT | Performed by: NURSE PRACTITIONER

## 2025-08-05 PROCEDURE — 3078F DIAST BP <80 MM HG: CPT | Performed by: NURSE PRACTITIONER

## 2025-08-05 PROCEDURE — 36415 COLL VENOUS BLD VENIPUNCTURE: CPT | Performed by: NURSE PRACTITIONER

## 2025-08-05 ASSESSMENT — PATIENT HEALTH QUESTIONNAIRE - PHQ9
SUM OF ALL RESPONSES TO PHQ QUESTIONS 1-9: 3
10. IF YOU CHECKED OFF ANY PROBLEMS, HOW DIFFICULT HAVE THESE PROBLEMS MADE IT FOR YOU TO DO YOUR WORK, TAKE CARE OF THINGS AT HOME, OR GET ALONG WITH OTHER PEOPLE: NOT DIFFICULT AT ALL
SUM OF ALL RESPONSES TO PHQ QUESTIONS 1-9: 3

## 2025-08-12 LAB — H PYLORI AG STL QL IA: NEGATIVE

## 2025-09-02 ENCOUNTER — PRE VISIT (OUTPATIENT)
Dept: GASTROENTEROLOGY | Facility: CLINIC | Age: 65
End: 2025-09-02

## (undated) DEVICE — ENDO SNARE EXACTO COLD 9MM LOOP 2.4MMX230CM 00711115

## (undated) DEVICE — ENDO FORCEP ENDOJAW BIOPSY 2.8MMX230CM FB-220U